# Patient Record
Sex: FEMALE | Race: WHITE | NOT HISPANIC OR LATINO | Employment: FULL TIME | URBAN - NONMETROPOLITAN AREA
[De-identification: names, ages, dates, MRNs, and addresses within clinical notes are randomized per-mention and may not be internally consistent; named-entity substitution may affect disease eponyms.]

---

## 2017-10-13 DIAGNOSIS — Z12.31 ENCOUNTER FOR SCREENING MAMMOGRAM FOR MALIGNANT NEOPLASM OF BREAST: Primary | ICD-10-CM

## 2017-10-19 ENCOUNTER — LAB (OUTPATIENT)
Dept: LAB | Facility: HOSPITAL | Age: 47
End: 2017-10-19

## 2017-10-19 DIAGNOSIS — Z00.00 GENERAL MEDICAL EXAM: Primary | ICD-10-CM

## 2017-10-19 DIAGNOSIS — Z00.00 GENERAL MEDICAL EXAM: ICD-10-CM

## 2017-10-19 DIAGNOSIS — Z00.00 ANNUAL PHYSICAL EXAM: ICD-10-CM

## 2017-10-19 LAB
ARTICHOKE IGE QN: 79 MG/DL (ref 1–129)
CHOLEST SERPL-MCNC: 182 MG/DL (ref 0–199)
HDLC SERPL-MCNC: 74 MG/DL (ref 60–200)
LDLC/HDLC SERPL: 1.31 {RATIO} (ref 0–3.22)
T4 FREE SERPL-MCNC: 1.33 NG/DL (ref 0.78–2.19)
TRIGL SERPL-MCNC: 54 MG/DL (ref 20–199)
TSH SERPL DL<=0.05 MIU/L-ACNC: 0.42 MIU/ML (ref 0.46–4.68)

## 2017-10-19 PROCEDURE — 80053 COMPREHEN METABOLIC PANEL: CPT | Performed by: GENERAL PRACTICE

## 2017-10-19 PROCEDURE — 84439 ASSAY OF FREE THYROXINE: CPT | Performed by: GENERAL PRACTICE

## 2017-10-19 PROCEDURE — 36415 COLL VENOUS BLD VENIPUNCTURE: CPT

## 2017-10-19 PROCEDURE — 84443 ASSAY THYROID STIM HORMONE: CPT | Performed by: GENERAL PRACTICE

## 2017-10-19 PROCEDURE — 80061 LIPID PANEL: CPT | Performed by: GENERAL PRACTICE

## 2017-10-20 ENCOUNTER — OFFICE VISIT (OUTPATIENT)
Dept: FAMILY MEDICINE CLINIC | Facility: CLINIC | Age: 47
End: 2017-10-20

## 2017-10-20 VITALS
OXYGEN SATURATION: 98 % | HEART RATE: 65 BPM | WEIGHT: 137.8 LBS | BODY MASS INDEX: 22.96 KG/M2 | HEIGHT: 65 IN | SYSTOLIC BLOOD PRESSURE: 112 MMHG | DIASTOLIC BLOOD PRESSURE: 70 MMHG

## 2017-10-20 DIAGNOSIS — E03.9 ACQUIRED HYPOTHYROIDISM: Chronic | ICD-10-CM

## 2017-10-20 DIAGNOSIS — Z00.00 ANNUAL PHYSICAL EXAM: Primary | ICD-10-CM

## 2017-10-20 DIAGNOSIS — M72.2 PLANTAR FASCIITIS: ICD-10-CM

## 2017-10-20 LAB
ALBUMIN SERPL-MCNC: 3.9 G/DL (ref 3.4–4.8)
ALBUMIN/GLOB SERPL: 1.3 G/DL (ref 1.1–1.8)
ALP SERPL-CCNC: 42 U/L (ref 38–126)
ALT SERPL W P-5'-P-CCNC: 24 U/L (ref 9–52)
ANION GAP SERPL CALCULATED.3IONS-SCNC: 12 MMOL/L (ref 5–15)
AST SERPL-CCNC: 21 U/L (ref 14–36)
BILIRUB SERPL-MCNC: 0.8 MG/DL (ref 0.2–1.3)
BUN BLD-MCNC: 11 MG/DL (ref 7–21)
BUN/CREAT SERPL: 17.5 (ref 7–25)
CALCIUM SPEC-SCNC: 8.9 MG/DL (ref 8.4–10.2)
CHLORIDE SERPL-SCNC: 104 MMOL/L (ref 95–110)
CO2 SERPL-SCNC: 25 MMOL/L (ref 22–31)
CREAT BLD-MCNC: 0.63 MG/DL (ref 0.5–1)
GFR SERPL CREATININE-BSD FRML MDRD: 101 ML/MIN/1.73 (ref 60–135)
GLOBULIN UR ELPH-MCNC: 2.9 GM/DL (ref 2.3–3.5)
GLUCOSE BLD-MCNC: 84 MG/DL (ref 60–100)
POTASSIUM BLD-SCNC: 4.6 MMOL/L (ref 3.5–5.1)
PROT SERPL-MCNC: 6.8 G/DL (ref 6.3–8.6)
SODIUM BLD-SCNC: 141 MMOL/L (ref 137–145)

## 2017-10-20 PROCEDURE — 99396 PREV VISIT EST AGE 40-64: CPT | Performed by: GENERAL PRACTICE

## 2017-10-20 RX ORDER — MELOXICAM 15 MG/1
15 TABLET ORAL DAILY PRN
Qty: 30 TABLET | Refills: 2 | Status: SHIPPED | OUTPATIENT
Start: 2017-10-20 | End: 2018-05-04

## 2017-10-20 RX ORDER — METHYLPREDNISOLONE 4 MG/1
TABLET ORAL
Qty: 21 TABLET | Refills: 0 | Status: SHIPPED | OUTPATIENT
Start: 2017-10-20 | End: 2018-05-02

## 2017-10-20 NOTE — PROGRESS NOTES
Subjective   Vanessa Beach is a 47 y.o. female.     Chief Complaint   Patient presents with   • Annual Exam     labs smamo   • Thyroid Problem   • Hyperlipidemia       History of Present Illness     For annual wellness exam.  Due for mammogram.  Labs reviewed. Training for a marathon and having heel pain.     The following portions of the patient's history were reviewed and updated as appropriate: allergies, current medications, past family and social history and problem list.    Outpatient Medications Prior to Visit   Medication Sig Dispense Refill   • ACETAMINOPHEN PO Take  by mouth.     • Cholecalciferol 1000 UNITS tablet Take 1,000 Units by mouth daily.     • levothyroxine (SYNTHROID, LEVOTHROID) 88 MCG tablet Take 1 tablet by mouth Daily. 90 tablet 4     No facility-administered medications prior to visit.      Review of Systems   Constitutional: Negative.  Negative for chills, fatigue, fever and unexpected weight change.   HENT: Negative.  Negative for congestion, ear pain, hearing loss, nosebleeds, rhinorrhea, sneezing, sore throat and tinnitus.    Eyes: Negative.  Negative for discharge.   Respiratory: Negative.  Negative for cough, shortness of breath and wheezing.    Cardiovascular: Negative.  Negative for chest pain and palpitations.   Gastrointestinal: Negative.  Negative for abdominal pain, constipation, diarrhea, nausea and vomiting.   Endocrine: Negative.    Genitourinary: Negative.  Negative for dysuria, frequency and urgency.   Musculoskeletal: Negative.  Negative for arthralgias, back pain, joint swelling, myalgias and neck pain.   Skin: Negative.  Negative for rash.   Allergic/Immunologic: Negative.    Neurological: Negative.  Negative for dizziness, weakness, numbness and headaches.   Hematological: Negative.  Negative for adenopathy.   Psychiatric/Behavioral: Negative.  Negative for dysphoric mood and sleep disturbance. The patient is not nervous/anxious.      Objective     Visit Vitals   •  "/70   • Pulse 65   • Ht 65\" (165.1 cm)   • Wt 137 lb 12.8 oz (62.5 kg)   • SpO2 98%   • BMI 22.93 kg/m2     Physical Exam   Constitutional: She is oriented to person, place, and time. She appears well-developed and well-nourished. No distress.   HENT:   Head: Normocephalic and atraumatic.   Nose: Nose normal.   Mouth/Throat: Oropharynx is clear and moist.   Eyes: Conjunctivae and EOM are normal. Pupils are equal, round, and reactive to light. Right eye exhibits no discharge. Left eye exhibits no discharge.   Neck: No tracheal deviation present. No thyromegaly present.   Cardiovascular: Normal rate, regular rhythm, normal heart sounds and intact distal pulses.    No murmur heard.  Pulmonary/Chest: Effort normal and breath sounds normal. No respiratory distress. She has no wheezes. She has no rales. She exhibits no tenderness. Right breast exhibits no inverted nipple, no mass, no nipple discharge, no skin change and no tenderness. Left breast exhibits no inverted nipple, no mass, no nipple discharge, no skin change and no tenderness.   Abdominal: Soft. Bowel sounds are normal. She exhibits no distension and no mass. There is no tenderness. No hernia.   Musculoskeletal: Normal range of motion. She exhibits no deformity.    Diabetic foot exam performed: tender over calcaneus.  Lymphadenopathy:     She has no cervical adenopathy.   Neurological: She is alert and oriented to person, place, and time. She has normal reflexes.   Skin: Skin is warm and dry.   Psychiatric: She has a normal mood and affect. Her behavior is normal. Judgment and thought content normal.   Nursing note and vitals reviewed.    Results for orders placed or performed in visit on 10/19/17   Lipid panel   Result Value Ref Range    Total Cholesterol 182 0 - 199 mg/dL    Triglycerides 54 20 - 199 mg/dL    HDL Cholesterol 74 60 - 200 mg/dL    LDL Cholesterol  79 1 - 129 mg/dL    LDL/HDL Ratio 1.31 0.00 - 3.22   TSH   Result Value Ref Range    TSH " 0.420 (L) 0.460 - 4.680 mIU/mL   T4, free   Result Value Ref Range    Free T4 1.33 0.78 - 2.19 ng/dL   Comprehensive Metabolic Panel   Result Value Ref Range    Glucose 84 60 - 100 mg/dL    BUN 11 7 - 21 mg/dL    Creatinine 0.63 0.50 - 1.00 mg/dL    Sodium 141 137 - 145 mmol/L    Potassium 4.6 3.5 - 5.1 mmol/L    Chloride 104 95 - 110 mmol/L    CO2 25.0 22.0 - 31.0 mmol/L    Calcium 8.9 8.4 - 10.2 mg/dL    Total Protein 6.8 6.3 - 8.6 g/dL    Albumin 3.90 3.40 - 4.80 g/dL    ALT (SGPT) 24 9 - 52 U/L    AST (SGOT) 21 14 - 36 U/L    Alkaline Phosphatase 42 38 - 126 U/L    Total Bilirubin 0.8 0.2 - 1.3 mg/dL    eGFR Non African Amer 101 >60 mL/min/1.73    Globulin 2.9 2.3 - 3.5 gm/dL    A/G Ratio 1.3 1.1 - 1.8 g/dL    BUN/Creatinine Ratio 17.5 7.0 - 25.0    Anion Gap 12.0 5.0 - 15.0 mmol/L      Assessment/Plan   Problem List Items Addressed This Visit        Endocrine    Acquired hypothyroidism (Chronic)    Relevant Medications    MethylPREDNISolone (MEDROL, LEON,) 4 MG tablet    Other Relevant Orders    TSH    T4, Free       Musculoskeletal and Integument    Plantar fasciitis      Other Visit Diagnoses     Annual physical exam    -  Primary    Relevant Orders    Comprehensive Metabolic Panel (Completed)    Comprehensive Metabolic Panel    Lipid Panel    TSH    T4, Free          Will notify regarding results.     New Medications Ordered This Visit   Medications   • MethylPREDNISolone (MEDROL, LEON,) 4 MG tablet     Sig: Take as directed on package instructions.     Dispense:  21 tablet     Refill:  0   • meloxicam (MOBIC) 15 MG tablet     Sig: Take 1 tablet by mouth Daily As Needed for Moderate Pain .     Dispense:  30 tablet     Refill:  2     Return in about 1 year (around 10/20/2018) for Annual physical.

## 2017-11-02 ENCOUNTER — CLINICAL SUPPORT (OUTPATIENT)
Dept: FAMILY MEDICINE CLINIC | Facility: CLINIC | Age: 47
End: 2017-11-02

## 2017-11-02 DIAGNOSIS — J06.9 VIRAL UPPER RESPIRATORY TRACT INFECTION: Primary | ICD-10-CM

## 2017-11-02 PROCEDURE — 96372 THER/PROPH/DIAG INJ SC/IM: CPT | Performed by: NURSE PRACTITIONER

## 2017-11-02 RX ORDER — TRIAMCINOLONE ACETONIDE 40 MG/ML
60 INJECTION, SUSPENSION INTRA-ARTICULAR; INTRAMUSCULAR ONCE
Status: COMPLETED | OUTPATIENT
Start: 2017-11-02 | End: 2017-11-02

## 2017-11-02 RX ADMIN — TRIAMCINOLONE ACETONIDE 60 MG: 40 INJECTION, SUSPENSION INTRA-ARTICULAR; INTRAMUSCULAR at 11:59

## 2018-01-08 RX ORDER — LEVOTHYROXINE SODIUM 88 UG/1
TABLET ORAL
Qty: 90 TABLET | Refills: 4 | Status: SHIPPED | OUTPATIENT
Start: 2018-01-08 | End: 2018-05-04

## 2018-02-07 ENCOUNTER — OFFICE VISIT (OUTPATIENT)
Dept: OTOLARYNGOLOGY | Facility: CLINIC | Age: 48
End: 2018-02-07

## 2018-02-07 VITALS — HEIGHT: 65 IN | WEIGHT: 135.4 LBS | OXYGEN SATURATION: 98 % | BODY MASS INDEX: 22.56 KG/M2

## 2018-02-07 DIAGNOSIS — R09.89 GLOBUS SENSATION: Primary | ICD-10-CM

## 2018-02-07 DIAGNOSIS — K21.9 GASTROESOPHAGEAL REFLUX DISEASE WITHOUT ESOPHAGITIS: ICD-10-CM

## 2018-02-07 DIAGNOSIS — J37.0 CHRONIC LARYNGITIS: ICD-10-CM

## 2018-02-07 PROCEDURE — 31575 DIAGNOSTIC LARYNGOSCOPY: CPT | Performed by: OTOLARYNGOLOGY

## 2018-02-07 PROCEDURE — 99203 OFFICE O/P NEW LOW 30 MIN: CPT | Performed by: OTOLARYNGOLOGY

## 2018-02-11 NOTE — PROGRESS NOTES
Subjective   Vanessa Beach is a 47 y.o. female.     History of Present Illness   Patient reports that back in November when she was training for a marathon she experiences sensation of something hanging in her throat.  She does have symptoms of acid reflux.  She initially would take some Zantac over-the-counter.  Was given steroids before her Marathon due to plantar fasciitis and experienced an episode of what sounds like nocturnal laryngospasm being awakened from sleep with a sensation that she was choking.  Since that time she has had intermittent symptoms of a sensation of a foreign body in her throat.  She says at times it will even feel like she has food caught in her throat.  No hemoptysis.  She has never been a tobacco user.      The following portions of the patient's history were reviewed and updated as appropriate: allergies, current medications, past family history, past medical history, past social history, past surgical history and problem list.      Vanessa Beach reports that she has never smoked. She has never used smokeless tobacco. She reports that she drinks alcohol.  Patient is not a tobacco user and has not been counseled for use of tobacco products    Family History   Problem Relation Age of Onset   • Uterine cancer Mother    • Coronary artery disease Mother    • Hypertension Mother    • Cancer Mother    • Diabetes Mother    • Bleeding Disorder Mother    • Depression Father        Allergies   Allergen Reactions   • Minocin [Minocycline Hcl] Other (See Comments)     Skin coloration changes         Current Outpatient Prescriptions:   •  ACETAMINOPHEN PO, Take  by mouth., Disp: , Rfl:   •  Cholecalciferol 1000 UNITS tablet, Take 1,000 Units by mouth daily., Disp: , Rfl:   •  levothyroxine (SYNTHROID, LEVOTHROID) 88 MCG tablet, TAKE ONE TABLET BY MOUTH DAILY, Disp: 90 tablet, Rfl: 4  •  meloxicam (MOBIC) 15 MG tablet, Take 1 tablet by mouth Daily As Needed for Moderate Pain ., Disp: 30  tablet, Rfl: 2  •  MethylPREDNISolone (MEDROL, LEON,) 4 MG tablet, Take as directed on package instructions., Disp: 21 tablet, Rfl: 0    Past Medical History:   Diagnosis Date   • Abnormal mammography    • Acquired hypothyroidism    • Acute bronchitis    • Acute otitis externa    • Allergic reaction     Allergic reaction to substance - Poison IVY      • Alopecia    • Aphthous ulcer of mouth    • Contact dermatitis due to plants, except food    • Cough    • Fever    • Herpes zoster    • Presbyopia    • Upper respiratory infection    • Vitamin D deficiency    • Wheezing          Review of Systems   Constitutional: Negative for fever.   Respiratory: Negative for cough.    All other systems reviewed and are negative.          Objective   Physical Exam  General: Well-developed well-nourished female in no acute distress.  Alert and oriented ×3. Head: Normocephalic. Face: Symmetrical strength and appearance. PERRL. EOMI. Voice:Strong. Speech:Fluent  Ears: External ears no deformity, canals no discharge, tympanic membranes intact clear and mobile bilaterally.  Nose: Nares show no discharge mass polyp or purulence.  Boggy mucosa is present.  No gross external deformity.  Septum: Midline  Oral cavity: Lips and gums without lesions.  Tongue and floor of mouth without lesions.  Parotid and submandibular ducts unobstructed.  No mucosal lesions on the buccal mucosa or vestibule of the mouth.  Pharynx: No erythema exudate mass or ulcer  Neck: No lymphadenopathy.  No thyromegaly.  Trachea and larynx midline.  No masses in the parotid or submandibular glands.    Procedure Note    Pre-operative Diagnosis:   Chief Complaint   Patient presents with   • Sore Throat       Post-operative Diagnosis:Chronic laryngitis    Anesthesia: topical with xylocaine and neosynephrine    Endoscopy Type:  Flexible Laryngoscopy    Procedure Details:    The patient was placed in the sitting position.  After topical anesthesia and decongestion, the 4 mm  laryngoscope was passed.  The nasal cavities, nasopharynx, oropharynx, hypopharynx, and larynx were all examined.  Vocal cords were examined during respiration and phonation.  The following findings were noted:    Findings: No masses in the nose, nasopharynx, hypopharynx.  Endolarynx shows mild chronic appearing edema and erythema particularly posteriorly consistent with chronic laryngitis.  Palpation of the interarytenoid fold with the tip of the scope reproduces the symptoms for the patient.  Vocal cord mobility is intact.  No evidence of endolaryngeal neoplasm.    Condition:  Stable.  Patient tolerated procedure well.    Complications:  None        Assessment/Plan   Vanessa was seen today for sore throat.    Diagnoses and all orders for this visit:    Globus sensation    Chronic laryngitis    Gastroesophageal reflux disease without esophagitis        Plan: Prescribed omeprazole 20 mg a day.  Advised general reflux precautions including avoidance of caffeine and carbonation as well as not eating within 2 hours of when she is going to lie down.  Explained that it may take several weeks to experience improvement in symptoms.  Return in 6 weeks call sooner for problems.

## 2018-03-08 ENCOUNTER — LAB (OUTPATIENT)
Dept: LAB | Facility: HOSPITAL | Age: 48
End: 2018-03-08

## 2018-03-08 DIAGNOSIS — E03.9 ACQUIRED HYPOTHYROIDISM: Primary | Chronic | ICD-10-CM

## 2018-03-08 DIAGNOSIS — E03.9 ACQUIRED HYPOTHYROIDISM: Chronic | ICD-10-CM

## 2018-03-08 LAB
T4 FREE SERPL-MCNC: 1.31 NG/DL (ref 0.78–2.19)
TSH SERPL DL<=0.05 MIU/L-ACNC: 0.36 MIU/ML (ref 0.46–4.68)

## 2018-03-08 PROCEDURE — 84443 ASSAY THYROID STIM HORMONE: CPT

## 2018-03-08 PROCEDURE — 36415 COLL VENOUS BLD VENIPUNCTURE: CPT

## 2018-03-08 PROCEDURE — 84439 ASSAY OF FREE THYROXINE: CPT

## 2018-05-02 ENCOUNTER — OFFICE VISIT (OUTPATIENT)
Dept: SURGERY | Facility: CLINIC | Age: 48
End: 2018-05-02

## 2018-05-02 VITALS
BODY MASS INDEX: 22.99 KG/M2 | HEIGHT: 65 IN | WEIGHT: 138 LBS | SYSTOLIC BLOOD PRESSURE: 116 MMHG | DIASTOLIC BLOOD PRESSURE: 74 MMHG

## 2018-05-02 DIAGNOSIS — M79.89 SOFT TISSUE MASS: Primary | ICD-10-CM

## 2018-05-02 PROCEDURE — 99203 OFFICE O/P NEW LOW 30 MIN: CPT | Performed by: SURGERY

## 2018-05-02 NOTE — PROGRESS NOTES
Subjective   Vanessa Beach is a 48 y.o. female     History of Present Illness symptomatic soft tissue mass right buttock.  Patient states been there for years but recently has enlarged in size and becomes more bothersome.  When she sits down through the bone her right buttock.  History of drainage in the very distant past but recently she thought this was just scar.  No history of any similar lesions.  No history of shot injection at the site.        Review of Systems   Constitutional: Negative.    HENT: Negative.    Eyes: Negative.    Respiratory: Negative.    Cardiovascular: Negative.    Gastrointestinal: Negative.    Endocrine: Negative.    Genitourinary: Negative.    Musculoskeletal: Negative.    Skin:        Skin Lesion Rt. Buttock   Allergic/Immunologic: Negative.    Neurological: Negative.    Hematological: Negative.    Psychiatric/Behavioral: Negative.      Past Medical History:   Diagnosis Date   • Abnormal mammography    • Acquired hypothyroidism    • Acute bronchitis    • Acute otitis externa    • Allergic reaction     Allergic reaction to substance - Poison IVY      • Alopecia    • Aphthous ulcer of mouth    • Contact dermatitis due to plants, except food    • Cough    • Fever    • Herpes zoster    • Presbyopia    • Upper respiratory infection    • Vitamin D deficiency    • Wheezing      Past Surgical History:   Procedure Laterality Date   • HYSTERECTOMY     • INJECTION OF MEDICATION  12/08/2014    KENALOG(2)   • LAPAROSCOPIC TUBAL LIGATION     • THYROIDECTOMY, PARTIAL  02/07/2000     Family History   Problem Relation Age of Onset   • Uterine cancer Mother    • Coronary artery disease Mother    • Hypertension Mother    • Cancer Mother    • Diabetes Mother    • Bleeding Disorder Mother    • Depression Father      Social History     Social History   • Marital status:      Spouse name: N/A   • Number of children: N/A   • Years of education: N/A     Occupational History   • Not on file.     Social  History Main Topics   • Smoking status: Never Smoker   • Smokeless tobacco: Never Used   • Alcohol use Yes   • Drug use: Unknown   • Sexual activity: Not on file     Other Topics Concern   • Not on file     Social History Narrative   • No narrative on file     Allergies   Allergen Reactions   • Minocin [Minocycline Hcl] Other (See Comments)     Skin coloration changes       Home Medications:  Prior to Admission medications    Medication Sig Start Date End Date Taking? Authorizing Provider   ACETAMINOPHEN PO Take  by mouth.   Yes Historical Provider, MD   Cholecalciferol 1000 UNITS tablet Take 1,000 Units by mouth daily.   Yes Historical Provider, MD   levothyroxine (SYNTHROID, LEVOTHROID) 88 MCG tablet TAKE ONE TABLET BY MOUTH DAILY 1/8/18  Yes Meghana Connolly MD   meloxicam (MOBIC) 15 MG tablet Take 1 tablet by mouth Daily As Needed for Moderate Pain . 10/20/17   Meghana Connolly MD   MethylPREDNISolone (MEDROL, LEON,) 4 MG tablet Take as directed on package instructions. 10/20/17 5/2/18  Meghana Connolly MD       Objective   Physical Exam   Constitutional: She is oriented to person, place, and time. She appears well-developed and well-nourished. No distress.   HENT:   Head: Normocephalic and atraumatic.   Nose: Nose normal.   Eyes: Conjunctivae are normal.   Neck: Normal range of motion. No tracheal deviation present. No thyromegaly present.   Cardiovascular: Normal rate.    No murmur heard.  Pulmonary/Chest: Effort normal and breath sounds normal.   Abdominal: Soft. She exhibits no distension. There is no tenderness. There is no rebound and no guarding. No hernia.   Musculoskeletal: She exhibits no tenderness or deformity.   Neurological: She is alert and oriented to person, place, and time.   Skin: Skin is warm and dry. No rash noted.   Psychiatric: She has a normal mood and affect. Her behavior is normal. Judgment and thought content normal.   Vitals reviewed.   right buttock over ishial  Tuberosity as A1.5  to 1.5 cm soft tissue mass superficial and not fixed without any redness drainage or fluctuance mildly tender to palpation    Assessment/Plan  scar prominent soft tissue mass right buttock.  Recommend excision this lesion has gotten larger and is symptomatic.  Fully discussed excising this and patient clearly understands alternatives risk and benefits and wishes to proceed      The encounter diagnosis was Soft tissue mass.                     This document has been electronically signed by Miguelito Galvan MD on May 2, 2018 9:07 AM

## 2018-05-02 NOTE — PATIENT INSTRUCTIONS
MyPlate from [employer]  The general, healthful diet is based on the 2010 Dietary Guidelines for Americans. The amount of food you need to eat from each food group depends on your age, sex, and level of physical activity and can be individualized by a dietitian. Go to ChooseMyPlate.gov for more information.  What do I need to know about the MyPlate plan?  · Enjoy your food, but eat less.  · Avoid oversized portions.  ¨ ½ of your plate should include fruits and vegetables.  ¨ ¼ of your plate should be grains.  ¨ ¼ of your plate should be protein.  Grains   · Make at least half of your grains whole grains.  · For a 2,000 calorie daily food plan, eat 6 oz every day.  · 1 oz is about 1 slice bread, 1 cup cereal, or ½ cup cooked rice, cereal, or pasta.  Vegetables   · Make half your plate fruits and vegetables.  · For a 2,000 calorie daily food plan, eat 2½ cups every day.  · 1 cup is about 1 cup raw or cooked vegetables or vegetable juice or 2 cups raw leafy greens.  Fruits   · Make half your plate fruits and vegetables.  · For a 2,000 calorie daily food plan, eat 2 cups every day.  · 1 cup is about 1 cup fruit or 100% fruit juice or ½ cup dried fruit.  Protein   · For a 2,000 calorie daily food plan, eat 5½ oz every day.  · 1 oz is about 1 oz meat, poultry, or fish, ¼ cup cooked beans, 1 egg, 1 Tbsp peanut butter, or ½ oz nuts or seeds.  Dairy   · Switch to fat-free or low-fat (1%) milk.  · For a 2,000 calorie daily food plan, eat 3 cups every day.  · 1 cup is about 1 cup milk or yogurt or soy milk (soy beverage), 1½ oz natural cheese, or 2 oz processed cheese.  Fats, Oils, and Empty Calories   · Only small amounts of oils are recommended.  · Empty calories are calories from solid fats or added sugars.  · Compare sodium in foods like soup, bread, and frozen meals. Choose the foods with lower numbers.  · Drink water instead of sugary drinks.  What foods can I eat?  Grains   Whole grains such as whole wheat, quinoa, millet,  and bulgur. Bread, rolls, and pasta made from whole grains. Brown or wild rice. Hot or cold cereals made from whole grains and without added sugar.  Vegetables   All fresh vegetables, especially fresh red, dark green, or orange vegetables. Peas and beans. Low-sodium frozen or canned vegetables prepared without added salt. Low-sodium vegetable juices.  Fruits   All fresh, frozen, and dried fruits. Canned fruit packed in water or fruit juice without added sugar. Fruit juices without added sugar.  Meats and Other Protein Sources   Boiled, baked, or grilled lean meat trimmed of fat. Skinless poultry. Fresh seafood and shellfish. Canned seafood packed in water. Unsalted nuts and unsalted nut butters. Tofu. Dried beans and pea. Eggs.  Dairy   Low-fat or fat-free milk, yogurt, and cheeses.  Sweets and Desserts   Frozen desserts made from low-fat milk.  Fats and Oils   Olive, peanut, and canola oils and margarine. Salad dressing and mayonnaise made from these oils.  Other   Soups and casseroles made from allowed ingredients and without added fat or salt.  The items listed above may not be a complete list of recommended foods or beverages. Contact your dietitian for more options.   What foods are not recommended?  Grains   Sweetened, low-fiber cereals. Packaged baked goods. Snack crackers and chips. Cheese crackers, butter crackers, and biscuits. Frozen waffles, sweet breads, doughnuts, pastries, packaged baking mixes, pancakes, cakes, and cookies.  Vegetables   Regular canned or frozen vegetables or vegetables prepared with salt. Canned tomatoes. Canned tomato sauce. Fried vegetables. Vegetables in cream sauce or cheese sauce.  Fruits   Fruits packed in syrup or made with added sugar.  Meats and Other Protein Sources   Marbled or fatty meats such as ribs. Poultry with skin. Fried meats, poultry, eggs, or fish. Sausages, hot dogs, and deli meats such as pastrami, bologna, or salami.  Dairy   Whole milk, cream, cheeses made  from whole milk, sour cream. Ice cream or yogurt made from whole milk or with added sugar.  Beverages   For adults, no more than one alcoholic drink per day. Regular soft drinks or other sugary beverages. Juice drinks.  Sweets and Desserts   Sugary or fatty desserts, candy, and other sweets.  Fats and Oils   Solid shortening or partially hydrogenated oils. Solid margarine. Margarine that contains trans fats. Butter.  The items listed above may not be a complete list of foods and beverages to avoid. Contact your dietitian for more information.   This information is not intended to replace advice given to you by your health care provider. Make sure you discuss any questions you have with your health care provider.  Document Released: 01/06/2009 Document Revised: 05/25/2017 Document Reviewed: 11/26/2014  [employer] Interactive Patient Education © 2017 [employer]

## 2018-05-04 ENCOUNTER — APPOINTMENT (OUTPATIENT)
Dept: PREADMISSION TESTING | Facility: HOSPITAL | Age: 48
End: 2018-05-04

## 2018-05-04 VITALS
RESPIRATION RATE: 14 BRPM | HEART RATE: 68 BPM | WEIGHT: 136 LBS | OXYGEN SATURATION: 98 % | HEIGHT: 65 IN | DIASTOLIC BLOOD PRESSURE: 70 MMHG | SYSTOLIC BLOOD PRESSURE: 100 MMHG | BODY MASS INDEX: 22.66 KG/M2

## 2018-05-04 RX ORDER — LEVOTHYROXINE SODIUM 88 UG/1
88 TABLET ORAL DAILY
COMMUNITY
End: 2018-10-23 | Stop reason: SDUPTHER

## 2018-05-04 RX ORDER — OMEPRAZOLE 20 MG/1
20 CAPSULE, DELAYED RELEASE ORAL DAILY
COMMUNITY

## 2018-05-04 RX ORDER — SODIUM CHLORIDE, SODIUM GLUCONATE, SODIUM ACETATE, POTASSIUM CHLORIDE, AND MAGNESIUM CHLORIDE 526; 502; 368; 37; 30 MG/100ML; MG/100ML; MG/100ML; MG/100ML; MG/100ML
1000 INJECTION, SOLUTION INTRAVENOUS CONTINUOUS
Status: CANCELLED | OUTPATIENT
Start: 2018-05-08

## 2018-05-04 NOTE — DISCHARGE INSTRUCTIONS
[facility]  Pre-op Information and Guidelines    You will be called after 2 p.m. the day before your surgery (Friday for Monday surgery) and notified of your time for arrival and approximate surgery time.  If you have not received a call by 4P.M., please contact Same Day Surgery at [phone] of if outside [facility] call [phone].    Please Follow these Important Safety Guidelines:    • The morning of your procedure, take only the medications listed below with   A sip of water:_____________________________________________       __LEVOTHYROXINE, PRILOSEC_________________    • DO NOT eat or drink anything after 12:00 midnight the night before surgery  Specific instructions concerning drinking clear liquids will be discussed during  the pre-surgery instruction call the day before your surgery.    • If you take a blood thinner (ex. Plavix, Coumadin, aspirin), ask your doctor when to stop it before surgery  STOP DATE: _________________    • Only 2 visitors are allowed in patient rooms at a time  Your visitors will be asked to wait in the lobby until the admission process is complete with the exception of a parent with a child and patients in need of special assistance.    • YOU CANNOT DRIVE YOURSELF HOME  You must be accompanied by someone who will be responsible for driving you home after surgery and for your care at home.    • DO NOT chew gum, use breath mints, hard candy, or smoke the day of surgery  • DO NOT drink alcohol for at least 24 hours before your surgery  • DO NOT wear any jewelry and remove all body piercing before coming to the hospital  • DO NOT wear make-up to the hospital  • If you are having surgery on an extremity (arm/leg/foot) remove nail polish/artificial nails on the surgical side  • Clothing, glasses, contacts, dentures, and hairpieces must be removed before surgery  • Bathe the night before or the morning of your surgery and do not use powders/lotions on  skin.

## 2018-05-07 ENCOUNTER — ANESTHESIA EVENT (OUTPATIENT)
Dept: PERIOP | Facility: HOSPITAL | Age: 48
End: 2018-05-07

## 2018-05-08 ENCOUNTER — HOSPITAL ENCOUNTER (OUTPATIENT)
Facility: HOSPITAL | Age: 48
Setting detail: HOSPITAL OUTPATIENT SURGERY
Discharge: HOME | End: 2018-05-08
Attending: SURGERY | Admitting: SURGERY

## 2018-05-08 ENCOUNTER — ANESTHESIA (OUTPATIENT)
Dept: PERIOP | Facility: HOSPITAL | Age: 48
End: 2018-05-08

## 2018-05-08 VITALS
HEART RATE: 76 BPM | WEIGHT: 137.13 LBS | OXYGEN SATURATION: 96 % | TEMPERATURE: 97.7 F | SYSTOLIC BLOOD PRESSURE: 102 MMHG | DIASTOLIC BLOOD PRESSURE: 63 MMHG | BODY MASS INDEX: 22.85 KG/M2 | HEIGHT: 65 IN | RESPIRATION RATE: 20 BRPM

## 2018-05-08 DIAGNOSIS — M79.89 SOFT TISSUE MASS: ICD-10-CM

## 2018-05-08 PROCEDURE — 88331 PATH CONSLTJ SURG 1 BLK 1SPC: CPT | Performed by: PATHOLOGY

## 2018-05-08 PROCEDURE — 88305 TISSUE EXAM BY PATHOLOGIST: CPT | Performed by: PATHOLOGY

## 2018-05-08 PROCEDURE — 25010000002 DEXAMETHASONE PER 1 MG

## 2018-05-08 PROCEDURE — 11404 EXC TR-EXT B9+MARG 3.1-4 CM: CPT | Performed by: SURGERY

## 2018-05-08 PROCEDURE — 88331 PATH CONSLTJ SURG 1 BLK 1SPC: CPT | Performed by: SURGERY

## 2018-05-08 PROCEDURE — 88332 PATH CONSLTJ SURG EA ADD BLK: CPT | Performed by: PATHOLOGY

## 2018-05-08 PROCEDURE — 88305 TISSUE EXAM BY PATHOLOGIST: CPT | Performed by: SURGERY

## 2018-05-08 PROCEDURE — 25010000002 PROPOFOL 10 MG/ML EMULSION

## 2018-05-08 PROCEDURE — 25010000002 FENTANYL CITRATE (PF) 100 MCG/2ML SOLUTION

## 2018-05-08 PROCEDURE — 88332 PATH CONSLTJ SURG EA ADD BLK: CPT | Performed by: SURGERY

## 2018-05-08 RX ORDER — LIDOCAINE HYDROCHLORIDE 10 MG/ML
INJECTION, SOLUTION INFILTRATION; PERINEURAL AS NEEDED
Status: DISCONTINUED | OUTPATIENT
Start: 2018-05-08 | End: 2018-05-08 | Stop reason: SURG

## 2018-05-08 RX ORDER — OXYCODONE HYDROCHLORIDE AND ACETAMINOPHEN 5; 325 MG/1; MG/1
1 TABLET ORAL EVERY 6 HOURS PRN
Qty: 12 TABLET | Refills: 0 | Status: SHIPPED | OUTPATIENT
Start: 2018-05-08 | End: 2018-05-16

## 2018-05-08 RX ORDER — FENTANYL CITRATE 50 UG/ML
INJECTION, SOLUTION INTRAMUSCULAR; INTRAVENOUS AS NEEDED
Status: DISCONTINUED | OUTPATIENT
Start: 2018-05-08 | End: 2018-05-08 | Stop reason: SURG

## 2018-05-08 RX ORDER — SCOLOPAMINE TRANSDERMAL SYSTEM 1 MG/1
1 PATCH, EXTENDED RELEASE TRANSDERMAL ONCE
Status: DISCONTINUED | OUTPATIENT
Start: 2018-05-08 | End: 2018-05-08 | Stop reason: HOSPADM

## 2018-05-08 RX ORDER — DEXAMETHASONE SODIUM PHOSPHATE 4 MG/ML
INJECTION, SOLUTION INTRA-ARTICULAR; INTRALESIONAL; INTRAMUSCULAR; INTRAVENOUS; SOFT TISSUE AS NEEDED
Status: DISCONTINUED | OUTPATIENT
Start: 2018-05-08 | End: 2018-05-08 | Stop reason: SURG

## 2018-05-08 RX ORDER — PROPOFOL 10 MG/ML
VIAL (ML) INTRAVENOUS AS NEEDED
Status: DISCONTINUED | OUTPATIENT
Start: 2018-05-08 | End: 2018-05-08 | Stop reason: SURG

## 2018-05-08 RX ORDER — SODIUM CHLORIDE, SODIUM GLUCONATE, SODIUM ACETATE, POTASSIUM CHLORIDE, AND MAGNESIUM CHLORIDE 526; 502; 368; 37; 30 MG/100ML; MG/100ML; MG/100ML; MG/100ML; MG/100ML
1000 INJECTION, SOLUTION INTRAVENOUS CONTINUOUS
Status: DISCONTINUED | OUTPATIENT
Start: 2018-05-08 | End: 2018-05-08 | Stop reason: HOSPADM

## 2018-05-08 RX ADMIN — SCOPALAMINE 1 PATCH: 1 PATCH, EXTENDED RELEASE TRANSDERMAL at 11:57

## 2018-05-08 RX ADMIN — LIDOCAINE HYDROCHLORIDE 20 MG: 10 INJECTION, SOLUTION INFILTRATION; PERINEURAL at 12:49

## 2018-05-08 RX ADMIN — DEXAMETHASONE SODIUM PHOSPHATE 4 MG: 4 INJECTION, SOLUTION INTRAMUSCULAR; INTRAVENOUS at 12:53

## 2018-05-08 RX ADMIN — PROPOFOL 20 MG: 10 INJECTION, EMULSION INTRAVENOUS at 13:03

## 2018-05-08 RX ADMIN — PROPOFOL 20 MG: 10 INJECTION, EMULSION INTRAVENOUS at 13:05

## 2018-05-08 RX ADMIN — FENTANYL CITRATE 50 MCG: 50 INJECTION, SOLUTION INTRAMUSCULAR; INTRAVENOUS at 12:48

## 2018-05-08 RX ADMIN — PROPOFOL 10 MG: 10 INJECTION, EMULSION INTRAVENOUS at 13:00

## 2018-05-08 RX ADMIN — PROPOFOL 20 MG: 10 INJECTION, EMULSION INTRAVENOUS at 12:59

## 2018-05-08 RX ADMIN — PROPOFOL 20 MG: 10 INJECTION, EMULSION INTRAVENOUS at 12:56

## 2018-05-08 RX ADMIN — PROPOFOL 50 MG: 10 INJECTION, EMULSION INTRAVENOUS at 12:49

## 2018-05-08 RX ADMIN — SODIUM CHLORIDE, SODIUM GLUCONATE, SODIUM ACETATE, POTASSIUM CHLORIDE, AND MAGNESIUM CHLORIDE 1000 ML: 526; 502; 368; 37; 30 INJECTION, SOLUTION INTRAVENOUS at 11:39

## 2018-05-08 RX ADMIN — PROPOFOL 10 MG: 10 INJECTION, EMULSION INTRAVENOUS at 13:06

## 2018-05-08 NOTE — ANESTHESIA POSTPROCEDURE EVALUATION
Patient: Vanessa Beach    Procedure Summary     Date:  05/08/18 Room / Location:  [facility] OR [address] [place] [address] OR    Anesthesia Start:  1243 Anesthesia Stop:  1324    Procedure:  EXCISE SOFT TISSUE MASS RIGHT BUTTOCK       (ANESTHESIA REQUEST YUEN) (Right ) Diagnosis:       Soft tissue mass      (Soft tissue mass [M79.9])    Surgeon:  Miguelito Galvan MD Provider:  Leighton Holguin MD    Anesthesia Type:  MAC ASA Status:  2          Anesthesia Type: MAC  Last vitals  BP   102/63 (05/08/18 1342)   Temp   97.7 °F (36.5 °C) (05/08/18 1342)   Pulse   76 (05/08/18 1342)   Resp   20 (05/08/18 1342)     SpO2   96 % (05/08/18 1342)     Post Anesthesia Care and Evaluation    Patient location during evaluation: bedside  Patient participation: complete - patient participated  Level of consciousness: awake and alert  Pain score: 0  Pain management: adequate  Airway patency: patent  Anesthetic complications: No anesthetic complications    Cardiovascular status: acceptable  Respiratory status: acceptable  Hydration status: acceptable

## 2018-05-08 NOTE — OP NOTE
MASS SOFT TISSUE EXCISION  Procedure Note    Vanessa Beach  5/8/2018    Pre-op Diagnosis:   Soft tissue mass [M79.9]    Post-op Diagnosis:     Post-Op Diagnosis Codes:     * Soft tissue mass [M79.9]    Procedure/CPT® Codes:      Procedure(s):  EXCISE SOFT TISSUE MASS RIGHT BUTTOCK       (ANESTHESIA REQUEST YUEN)    Surgeon(s):  Miguelito Galvan MD    Anesthesia: Choice    Staff:   Circulator: Amita Poe RN  Scrub Person: Anali Palma; Chata Corrigan V  Assistant: Marizol Aguila CSA    Estimated Blood Loss: minimal    Specimens:                ID Type Source Tests Collected by Time   A : 1303, f.s. mass right buttock Tissue Buttock, Right TISSUE PATHOLOGY EXAM Miguelito Galvan MD 5/8/2018 1303         Drains:      Indications:  48 yof with slowly enlarging mass over rt ischial tuberostomy presents for excisional bx    Findings:  Cystic Soft tissue mass completely excised.  Incision was 4 cm in length at completion.    Complications:  None    Procedure: The patient was brought to the operating room. She was placed under MAC anesthesia without any difficulty. Placed left side down, right side up. The right buttock was then prepped and draped in normal sterile fashion. She had SCDs in place. Infiltrated local and good block was obtained. Appropriate timeout was taken. Ellipse of skin outlying the mass was then excised sharply and then using the scalpel electrocautery the mass was completely excised. It appeared to be involving to the superficial  portion of the subcutaneous tissue. That was sent to pathology and grossly appeared to be chronic abscess. We irrigated and good hemostasis was noted. We then closed the wound with interrupted 4-0 Monocryl simple stitch then running 4-0 Monocryl subcuticular stitch. Glue was used for skin closure. The patient was awakened and transferred to the recovery room in awake, stable condition.          Disposition:  Discharged persame day surgery protocol.  She'll  follow up with me in the office in one week.  She was given 12 Percocet 5 tablets to be apparent basis for pain        Miguelito Galvan MD     Date: 5/8/2018  Time: 1:17 PM

## 2018-05-08 NOTE — DISCHARGE INSTRUCTIONS
Minor Surgery  Outpatient Instructions    General Information  You have had a minor surgical procedure and are not expected to require extensive treatment or a long recovery period.  However, the following information/instructions that are listed serve as guidelines to help you recover at home.    Activity:  As tolerated    Hygiene:  May shower    Dressing/Wound Care:  If incision site drains place dressing. Otherwise leave area open to air    Diet:  ***    Important Points:  -Call your doctor to report any of the following:   -unusual or excessive bleeding/drainage   -pain not reduced/controlled by medication   -elevated temperature consistently greater that 100 degrees F   -increased swelling, redness, bruising, or tenderness in surgical area  -Take medications as prescribed by your physician  -If you have any questions, please contact your doctor or the Same Day Surgery Unit at   [phone]  -If a post-operative emergency occurs, report to your nearest ER

## 2018-05-08 NOTE — ANESTHESIA PREPROCEDURE EVALUATION
Anesthesia Evaluation     history of anesthetic complications: PONV  NPO Solid Status: > 8 hours  NPO Liquid Status: > 4 hours           Airway   Mallampati: II  TM distance: >3 FB  Neck ROM: full  No difficulty expected  Dental - normal exam     Pulmonary - negative pulmonary ROS and normal exam    breath sounds clear to auscultation  Cardiovascular - negative cardio ROS  Exercise tolerance: good (4-7 METS)    Rhythm: regular  Rate: normal    (+) murmur,   (-) angina      Neuro/Psych  (+) headaches (migraines occ),     GI/Hepatic/Renal/Endo    (+)  GERD well controlled,  hypothyroidism,     Musculoskeletal     (+) back pain,   Abdominal    Substance History   (+) alcohol use (socially),      OB/GYN negative ob/gyn ROS         Other - negative ROS                       Anesthesia Plan    ASA 2     MAC   total IV anesthesia  intravenous induction   Anesthetic plan and risks discussed with patient and spouse/significant other.

## 2018-05-09 LAB
LAB AP CASE REPORT: NORMAL
LAB AP INTRADEPARTMENTAL CONSULT: NORMAL
Lab: NORMAL
Lab: NORMAL
PATH REPORT.FINAL DX SPEC: NORMAL
PATH REPORT.GROSS SPEC: NORMAL

## 2018-05-16 ENCOUNTER — OFFICE VISIT (OUTPATIENT)
Dept: SURGERY | Facility: CLINIC | Age: 48
End: 2018-05-16

## 2018-05-16 VITALS
SYSTOLIC BLOOD PRESSURE: 106 MMHG | DIASTOLIC BLOOD PRESSURE: 76 MMHG | BODY MASS INDEX: 22.99 KG/M2 | WEIGHT: 138 LBS | HEIGHT: 65 IN

## 2018-05-16 DIAGNOSIS — M79.89 SOFT TISSUE MASS: Primary | ICD-10-CM

## 2018-05-16 PROCEDURE — 99024 POSTOP FOLLOW-UP VISIT: CPT | Performed by: SURGERY

## 2018-05-16 NOTE — PROGRESS NOTES
8d ago   Final Diagnosis   SKIN, RIGHT BUTTOCK:   PILOMATRICOMA.    Electronically signed by Dima Mack MD on 5/9/2018 at 1504     Went over the pathology with the patient.  She did well from her surgery without any complaints.  She had some drainage but none last few days.  Wound is clean its intact appears to be healing nicely there is no redness tenderness fluctuance or drainage.  I went over the pathology with her and she clearly understands this is a benign finding and has been excised.  She'll follow up with us on a when necessary basis

## 2018-05-16 NOTE — PATIENT INSTRUCTIONS
MyPlate from [employer]  The general, healthful diet is based on the 2010 Dietary Guidelines for Americans. The amount of food you need to eat from each food group depends on your age, sex, and level of physical activity and can be individualized by a dietitian. Go to ChooseMyPlate.gov for more information.  What do I need to know about the MyPlate plan?  · Enjoy your food, but eat less.  · Avoid oversized portions.  ¨ ½ of your plate should include fruits and vegetables.  ¨ ¼ of your plate should be grains.  ¨ ¼ of your plate should be protein.  Grains   · Make at least half of your grains whole grains.  · For a 2,000 calorie daily food plan, eat 6 oz every day.  · 1 oz is about 1 slice bread, 1 cup cereal, or ½ cup cooked rice, cereal, or pasta.  Vegetables   · Make half your plate fruits and vegetables.  · For a 2,000 calorie daily food plan, eat 2½ cups every day.  · 1 cup is about 1 cup raw or cooked vegetables or vegetable juice or 2 cups raw leafy greens.  Fruits   · Make half your plate fruits and vegetables.  · For a 2,000 calorie daily food plan, eat 2 cups every day.  · 1 cup is about 1 cup fruit or 100% fruit juice or ½ cup dried fruit.  Protein   · For a 2,000 calorie daily food plan, eat 5½ oz every day.  · 1 oz is about 1 oz meat, poultry, or fish, ¼ cup cooked beans, 1 egg, 1 Tbsp peanut butter, or ½ oz nuts or seeds.  Dairy   · Switch to fat-free or low-fat (1%) milk.  · For a 2,000 calorie daily food plan, eat 3 cups every day.  · 1 cup is about 1 cup milk or yogurt or soy milk (soy beverage), 1½ oz natural cheese, or 2 oz processed cheese.  Fats, Oils, and Empty Calories   · Only small amounts of oils are recommended.  · Empty calories are calories from solid fats or added sugars.  · Compare sodium in foods like soup, bread, and frozen meals. Choose the foods with lower numbers.  · Drink water instead of sugary drinks.  What foods can I eat?  Grains   Whole grains such as whole wheat, quinoa, millet,  and bulgur. Bread, rolls, and pasta made from whole grains. Brown or wild rice. Hot or cold cereals made from whole grains and without added sugar.  Vegetables   All fresh vegetables, especially fresh red, dark green, or orange vegetables. Peas and beans. Low-sodium frozen or canned vegetables prepared without added salt. Low-sodium vegetable juices.  Fruits   All fresh, frozen, and dried fruits. Canned fruit packed in water or fruit juice without added sugar. Fruit juices without added sugar.  Meats and Other Protein Sources   Boiled, baked, or grilled lean meat trimmed of fat. Skinless poultry. Fresh seafood and shellfish. Canned seafood packed in water. Unsalted nuts and unsalted nut butters. Tofu. Dried beans and pea. Eggs.  Dairy   Low-fat or fat-free milk, yogurt, and cheeses.  Sweets and Desserts   Frozen desserts made from low-fat milk.  Fats and Oils   Olive, peanut, and canola oils and margarine. Salad dressing and mayonnaise made from these oils.  Other   Soups and casseroles made from allowed ingredients and without added fat or salt.  The items listed above may not be a complete list of recommended foods or beverages. Contact your dietitian for more options.   What foods are not recommended?  Grains   Sweetened, low-fiber cereals. Packaged baked goods. Snack crackers and chips. Cheese crackers, butter crackers, and biscuits. Frozen waffles, sweet breads, doughnuts, pastries, packaged baking mixes, pancakes, cakes, and cookies.  Vegetables   Regular canned or frozen vegetables or vegetables prepared with salt. Canned tomatoes. Canned tomato sauce. Fried vegetables. Vegetables in cream sauce or cheese sauce.  Fruits   Fruits packed in syrup or made with added sugar.  Meats and Other Protein Sources   Marbled or fatty meats such as ribs. Poultry with skin. Fried meats, poultry, eggs, or fish. Sausages, hot dogs, and deli meats such as pastrami, bologna, or salami.  Dairy   Whole milk, cream, cheeses made  from whole milk, sour cream. Ice cream or yogurt made from whole milk or with added sugar.  Beverages   For adults, no more than one alcoholic drink per day. Regular soft drinks or other sugary beverages. Juice drinks.  Sweets and Desserts   Sugary or fatty desserts, candy, and other sweets.  Fats and Oils   Solid shortening or partially hydrogenated oils. Solid margarine. Margarine that contains trans fats. Butter.  The items listed above may not be a complete list of foods and beverages to avoid. Contact your dietitian for more information.   This information is not intended to replace advice given to you by your health care provider. Make sure you discuss any questions you have with your health care provider.  Document Released: 01/06/2009 Document Revised: 05/25/2017 Document Reviewed: 11/26/2014  [employer] Interactive Patient Education © 2017 [employer]

## 2018-05-22 ENCOUNTER — OFFICE VISIT (OUTPATIENT)
Dept: SURGERY | Facility: CLINIC | Age: 48
End: 2018-05-22

## 2018-05-22 VITALS
BODY MASS INDEX: 23.32 KG/M2 | HEIGHT: 65 IN | DIASTOLIC BLOOD PRESSURE: 62 MMHG | SYSTOLIC BLOOD PRESSURE: 104 MMHG | WEIGHT: 140 LBS

## 2018-05-22 DIAGNOSIS — M79.89 SOFT TISSUE MASS: Primary | ICD-10-CM

## 2018-05-22 PROCEDURE — 99212 OFFICE O/P EST SF 10 MIN: CPT | Performed by: SURGERY

## 2018-05-22 RX ORDER — CEPHALEXIN 500 MG/1
500 CAPSULE ORAL 4 TIMES DAILY
Qty: 28 CAPSULE | Refills: 0 | Status: SHIPPED | OUTPATIENT
Start: 2018-05-22 | End: 2018-10-23

## 2018-05-22 NOTE — PATIENT INSTRUCTIONS
MyPlate from [employer]  The general, healthful diet is based on the 2010 Dietary Guidelines for Americans. The amount of food you need to eat from each food group depends on your age, sex, and level of physical activity and can be individualized by a dietitian. Go to ChooseMyPlate.gov for more information.  What do I need to know about the MyPlate plan?  · Enjoy your food, but eat less.  · Avoid oversized portions.  ¨ ½ of your plate should include fruits and vegetables.  ¨ ¼ of your plate should be grains.  ¨ ¼ of your plate should be protein.  Grains   · Make at least half of your grains whole grains.  · For a 2,000 calorie daily food plan, eat 6 oz every day.  · 1 oz is about 1 slice bread, 1 cup cereal, or ½ cup cooked rice, cereal, or pasta.  Vegetables   · Make half your plate fruits and vegetables.  · For a 2,000 calorie daily food plan, eat 2½ cups every day.  · 1 cup is about 1 cup raw or cooked vegetables or vegetable juice or 2 cups raw leafy greens.  Fruits   · Make half your plate fruits and vegetables.  · For a 2,000 calorie daily food plan, eat 2 cups every day.  · 1 cup is about 1 cup fruit or 100% fruit juice or ½ cup dried fruit.  Protein   · For a 2,000 calorie daily food plan, eat 5½ oz every day.  · 1 oz is about 1 oz meat, poultry, or fish, ¼ cup cooked beans, 1 egg, 1 Tbsp peanut butter, or ½ oz nuts or seeds.  Dairy   · Switch to fat-free or low-fat (1%) milk.  · For a 2,000 calorie daily food plan, eat 3 cups every day.  · 1 cup is about 1 cup milk or yogurt or soy milk (soy beverage), 1½ oz natural cheese, or 2 oz processed cheese.  Fats, Oils, and Empty Calories   · Only small amounts of oils are recommended.  · Empty calories are calories from solid fats or added sugars.  · Compare sodium in foods like soup, bread, and frozen meals. Choose the foods with lower numbers.  · Drink water instead of sugary drinks.  What foods can I eat?  Grains   Whole grains such as whole wheat, quinoa, millet,  and bulgur. Bread, rolls, and pasta made from whole grains. Brown or wild rice. Hot or cold cereals made from whole grains and without added sugar.  Vegetables   All fresh vegetables, especially fresh red, dark green, or orange vegetables. Peas and beans. Low-sodium frozen or canned vegetables prepared without added salt. Low-sodium vegetable juices.  Fruits   All fresh, frozen, and dried fruits. Canned fruit packed in water or fruit juice without added sugar. Fruit juices without added sugar.  Meats and Other Protein Sources   Boiled, baked, or grilled lean meat trimmed of fat. Skinless poultry. Fresh seafood and shellfish. Canned seafood packed in water. Unsalted nuts and unsalted nut butters. Tofu. Dried beans and pea. Eggs.  Dairy   Low-fat or fat-free milk, yogurt, and cheeses.  Sweets and Desserts   Frozen desserts made from low-fat milk.  Fats and Oils   Olive, peanut, and canola oils and margarine. Salad dressing and mayonnaise made from these oils.  Other   Soups and casseroles made from allowed ingredients and without added fat or salt.  The items listed above may not be a complete list of recommended foods or beverages. Contact your dietitian for more options.   What foods are not recommended?  Grains   Sweetened, low-fiber cereals. Packaged baked goods. Snack crackers and chips. Cheese crackers, butter crackers, and biscuits. Frozen waffles, sweet breads, doughnuts, pastries, packaged baking mixes, pancakes, cakes, and cookies.  Vegetables   Regular canned or frozen vegetables or vegetables prepared with salt. Canned tomatoes. Canned tomato sauce. Fried vegetables. Vegetables in cream sauce or cheese sauce.  Fruits   Fruits packed in syrup or made with added sugar.  Meats and Other Protein Sources   Marbled or fatty meats such as ribs. Poultry with skin. Fried meats, poultry, eggs, or fish. Sausages, hot dogs, and deli meats such as pastrami, bologna, or salami.  Dairy   Whole milk, cream, cheeses made  from whole milk, sour cream. Ice cream or yogurt made from whole milk or with added sugar.  Beverages   For adults, no more than one alcoholic drink per day. Regular soft drinks or other sugary beverages. Juice drinks.  Sweets and Desserts   Sugary or fatty desserts, candy, and other sweets.  Fats and Oils   Solid shortening or partially hydrogenated oils. Solid margarine. Margarine that contains trans fats. Butter.  The items listed above may not be a complete list of foods and beverages to avoid. Contact your dietitian for more information.   This information is not intended to replace advice given to you by your health care provider. Make sure you discuss any questions you have with your health care provider.  Document Released: 01/06/2009 Document Revised: 05/25/2017 Document Reviewed: 11/26/2014  [employer] Interactive Interactive Patient Education © 2017 [employer]

## 2018-05-22 NOTE — PROGRESS NOTES
Patient is now over 2 weeks out from excision of a pilomatricoma from her right buttock.  Patient was doing well to few days ago she had some drainage in the area became more swollen and red and tender.  Swelling has gone down with the drainage but is still slightly red and mildly tender to palpation no real drainage present currently.  No significant swelling or fluctuance.  Suspect the skin edges came loose this is in a place where it is easily traumatized and skin would be pulled apart.  We keep the area clean and allowed to drain it and wants to drain.  We'll start her on Keflex for the next 7 days.  Expect this to continue to improve and if not she will let us know

## 2018-10-19 ENCOUNTER — LAB (OUTPATIENT)
Dept: LAB | Facility: HOSPITAL | Age: 48
End: 2018-10-19

## 2018-10-19 DIAGNOSIS — Z00.00 ANNUAL PHYSICAL EXAM: ICD-10-CM

## 2018-10-19 DIAGNOSIS — E03.9 ACQUIRED HYPOTHYROIDISM: Chronic | ICD-10-CM

## 2018-10-19 LAB
ALBUMIN SERPL-MCNC: 4.7 G/DL (ref 3.4–4.8)
ALBUMIN/GLOB SERPL: 1.6 G/DL (ref 1.1–1.8)
ALP SERPL-CCNC: 43 U/L (ref 38–126)
ALT SERPL W P-5'-P-CCNC: 31 U/L (ref 9–52)
ANION GAP SERPL CALCULATED.3IONS-SCNC: 11 MMOL/L (ref 5–15)
ARTICHOKE IGE QN: 108 MG/DL (ref 1–129)
AST SERPL-CCNC: 26 U/L (ref 14–36)
BILIRUB SERPL-MCNC: 1 MG/DL (ref 0.2–1.3)
BUN BLD-MCNC: 12 MG/DL (ref 7–21)
BUN/CREAT SERPL: 21.8 (ref 7–25)
CALCIUM SPEC-SCNC: 9.3 MG/DL (ref 8.4–10.2)
CHLORIDE SERPL-SCNC: 98 MMOL/L (ref 95–110)
CHOLEST SERPL-MCNC: 207 MG/DL (ref 0–199)
CO2 SERPL-SCNC: 27 MMOL/L (ref 22–31)
CREAT BLD-MCNC: 0.55 MG/DL (ref 0.5–1)
GFR SERPL CREATININE-BSD FRML MDRD: 118 ML/MIN/1.73 (ref 58–135)
GLOBULIN UR ELPH-MCNC: 3 GM/DL (ref 2.3–3.5)
GLUCOSE BLD-MCNC: 89 MG/DL (ref 60–100)
HDLC SERPL-MCNC: 82 MG/DL (ref 60–200)
LDLC/HDLC SERPL: 1.38 {RATIO} (ref 0–3.22)
POTASSIUM BLD-SCNC: 4.2 MMOL/L (ref 3.5–5.1)
PROT SERPL-MCNC: 7.7 G/DL (ref 6.3–8.6)
SODIUM BLD-SCNC: 136 MMOL/L (ref 137–145)
T4 FREE SERPL-MCNC: 1.38 NG/DL (ref 0.78–2.19)
TRIGL SERPL-MCNC: 60 MG/DL (ref 20–199)
TSH SERPL DL<=0.05 MIU/L-ACNC: 0.19 MIU/ML (ref 0.46–4.68)

## 2018-10-19 PROCEDURE — 84439 ASSAY OF FREE THYROXINE: CPT

## 2018-10-19 PROCEDURE — 84480 ASSAY TRIIODOTHYRONINE (T3): CPT

## 2018-10-19 PROCEDURE — 80053 COMPREHEN METABOLIC PANEL: CPT

## 2018-10-19 PROCEDURE — 36415 COLL VENOUS BLD VENIPUNCTURE: CPT

## 2018-10-19 PROCEDURE — 80061 LIPID PANEL: CPT

## 2018-10-19 PROCEDURE — 84443 ASSAY THYROID STIM HORMONE: CPT

## 2018-10-22 DIAGNOSIS — Z12.31 ENCOUNTER FOR SCREENING MAMMOGRAM FOR MALIGNANT NEOPLASM OF BREAST: Primary | ICD-10-CM

## 2018-10-23 ENCOUNTER — OFFICE VISIT (OUTPATIENT)
Dept: FAMILY MEDICINE CLINIC | Facility: CLINIC | Age: 48
End: 2018-10-23

## 2018-10-23 VITALS
HEART RATE: 66 BPM | WEIGHT: 128 LBS | SYSTOLIC BLOOD PRESSURE: 105 MMHG | OXYGEN SATURATION: 99 % | HEIGHT: 66 IN | DIASTOLIC BLOOD PRESSURE: 70 MMHG | BODY MASS INDEX: 20.57 KG/M2

## 2018-10-23 DIAGNOSIS — E03.9 ACQUIRED HYPOTHYROIDISM: Primary | Chronic | ICD-10-CM

## 2018-10-23 LAB — T3 SERPL-MCNC: 101 NG/DL (ref 97–169)

## 2018-10-23 PROCEDURE — 99396 PREV VISIT EST AGE 40-64: CPT | Performed by: GENERAL PRACTICE

## 2018-10-23 RX ORDER — LEVOTHYROXINE SODIUM 88 UG/1
88 TABLET ORAL DAILY
Qty: 90 TABLET | Refills: 3 | Status: SHIPPED | OUTPATIENT
Start: 2018-10-23 | End: 2019-10-30 | Stop reason: SDUPTHER

## 2018-10-23 NOTE — PATIENT INSTRUCTIONS
Decrease levothyroxine by 1/2 tab a week    Go to lab in 2-3 months and repeat tests    [place]ro[place] for hot flashes

## 2018-10-23 NOTE — PROGRESS NOTES
Subjective   Vanessa Beach is a 48 y.o. female.     Chief Complaint   Patient presents with   • Annual Exam   • Hypothyroidism   • Vitamin D Deficiency       History of Present Illness     For annual wellness exam.  Labs reviewed. Due for mammogram.     The following portions of the patient's history were reviewed and updated as appropriate: allergies, current medications, past family and social history and problem list.    Outpatient Medications Prior to Visit   Medication Sig Dispense Refill   • ACETAMINOPHEN PO Take  by mouth.     • Cholecalciferol 1000 UNITS tablet Take 1,000 Units by mouth daily.     • omeprazole (priLOSEC) 20 MG capsule Take 20 mg by mouth Daily.     • levothyroxine (SYNTHROID, LEVOTHROID) 88 MCG tablet Take 88 mcg by mouth Daily.     • cephalexin (KEFLEX) 500 MG capsule Take 1 capsule by mouth 4 (Four) Times a Day. 28 capsule 0     No facility-administered medications prior to visit.        Review of Systems   Constitutional: Negative.  Negative for chills, fatigue, fever and unexpected weight change.   HENT: Negative.  Negative for congestion, ear pain, hearing loss, nosebleeds, rhinorrhea, sneezing, sore throat and tinnitus.    Eyes: Negative.  Negative for discharge.   Respiratory: Negative.  Negative for cough, shortness of breath and wheezing.    Cardiovascular: Negative.  Negative for chest pain and palpitations.   Gastrointestinal: Negative.  Negative for abdominal pain, constipation, diarrhea, nausea and vomiting.   Endocrine: Negative.    Genitourinary: Negative.  Negative for dysuria, frequency and urgency.   Musculoskeletal: Negative.  Negative for arthralgias, back pain, joint swelling, myalgias and neck pain.   Skin: Negative.  Negative for rash.   Allergic/Immunologic: Negative.    Neurological: Negative.  Negative for dizziness, weakness, numbness and headaches.   Hematological: Negative.  Negative for adenopathy.   Psychiatric/Behavioral: Negative.  Negative for dysphoric  "mood and sleep disturbance. The patient is not nervous/anxious.        Objective     Visit Vitals  /70 (BP Location: Left arm, Patient Position: Sitting, Cuff Size: Adult)   Pulse 66   Ht 166.4 cm (65.5\")   Wt 58.1 kg (128 lb)   SpO2 99%   BMI 20.98 kg/m²     Physical Exam   Constitutional: She is oriented to person, place, and time. She appears well-developed and well-nourished. No distress.   HENT:   Head: Normocephalic and atraumatic.   Nose: Nose normal.   Mouth/Throat: Oropharynx is clear and moist.   Eyes: Pupils are equal, round, and reactive to light. Conjunctivae and EOM are normal. Right eye exhibits no discharge. Left eye exhibits no discharge.   Neck: No tracheal deviation present. No thyromegaly present.   Cardiovascular: Normal rate, regular rhythm, normal heart sounds and intact distal pulses.    No murmur heard.  Pulmonary/Chest: Effort normal and breath sounds normal. No respiratory distress. She has no wheezes. She has no rales. She exhibits no tenderness. Right breast exhibits no inverted nipple, no mass, no nipple discharge, no skin change and no tenderness. Left breast exhibits no inverted nipple, no mass, no nipple discharge, no skin change and no tenderness.   Abdominal: Soft. Bowel sounds are normal. She exhibits no distension and no mass. There is no tenderness. No hernia.   Musculoskeletal: Normal range of motion. She exhibits no deformity.   Lymphadenopathy:     She has no cervical adenopathy.   Neurological: She is alert and oriented to person, place, and time. She has normal reflexes.   Skin: Skin is warm and dry.   Psychiatric: She has a normal mood and affect. Her behavior is normal. Judgment and thought content normal.   Nursing note and vitals reviewed.    Results for orders placed or performed in visit on 10/19/18   Comprehensive Metabolic Panel   Result Value Ref Range    Glucose 89 60 - 100 mg/dL    BUN 12 7 - 21 mg/dL    Creatinine 0.55 0.50 - 1.00 mg/dL    Sodium 136 (L) " 137 - 145 mmol/L    Potassium 4.2 3.5 - 5.1 mmol/L    Chloride 98 95 - 110 mmol/L    CO2 27.0 22.0 - 31.0 mmol/L    Calcium 9.3 8.4 - 10.2 mg/dL    Total Protein 7.7 6.3 - 8.6 g/dL    Albumin 4.70 3.40 - 4.80 g/dL    ALT (SGPT) 31 9 - 52 U/L    AST (SGOT) 26 14 - 36 U/L    Alkaline Phosphatase 43 38 - 126 U/L    Total Bilirubin 1.0 0.2 - 1.3 mg/dL    eGFR Non  Amer 118 58 - 135 mL/min/1.73    Globulin 3.0 2.3 - 3.5 gm/dL    A/G Ratio 1.6 1.1 - 1.8 g/dL    BUN/Creatinine Ratio 21.8 7.0 - 25.0    Anion Gap 11.0 5.0 - 15.0 mmol/L   Lipid Panel   Result Value Ref Range    Total Cholesterol 207 (H) 0 - 199 mg/dL    Triglycerides 60 20 - 199 mg/dL    HDL Cholesterol 82 60 - 200 mg/dL    LDL Cholesterol  108 1 - 129 mg/dL    LDL/HDL Ratio 1.38 0.00 - 3.22   TSH   Result Value Ref Range    TSH 0.190 (L) 0.460 - 4.680 mIU/mL   T4, Free   Result Value Ref Range    Free T4 1.38 0.78 - 2.19 ng/dL      Assessment/Plan   Problem List Items Addressed This Visit        Endocrine    Acquired hypothyroidism - Primary (Chronic)    Relevant Medications    levothyroxine (SYNTHROID, LEVOTHROID) 88 MCG tablet    Other Relevant Orders    TSH    T4, Free    T3          Will notify regarding results. Decrease levothyroxine by 1/2 tab a week. Go to lab in 2-3 months and repeat tests    New Medications Ordered This Visit   Medications   • levothyroxine (SYNTHROID, LEVOTHROID) 88 MCG tablet     Sig: Take 1 tablet by mouth Daily.     Dispense:  90 tablet     Refill:  3     Return in about 1 year (around 10/23/2019) for Annual physical.

## 2018-10-25 DIAGNOSIS — Z12.31 ENCOUNTER FOR SCREENING MAMMOGRAM FOR MALIGNANT NEOPLASM OF BREAST: Primary | ICD-10-CM

## 2019-09-11 ENCOUNTER — OFFICE VISIT (OUTPATIENT)
Dept: FAMILY MEDICINE CLINIC | Facility: CLINIC | Age: 49
End: 2019-09-11

## 2019-09-11 VITALS
BODY MASS INDEX: 21.84 KG/M2 | HEIGHT: 66 IN | WEIGHT: 135.9 LBS | SYSTOLIC BLOOD PRESSURE: 108 MMHG | DIASTOLIC BLOOD PRESSURE: 70 MMHG | HEART RATE: 76 BPM | OXYGEN SATURATION: 99 %

## 2019-09-11 DIAGNOSIS — M71.50 TRAUMATIC BURSITIS: ICD-10-CM

## 2019-09-11 DIAGNOSIS — M25.552 LEFT HIP PAIN: Primary | ICD-10-CM

## 2019-09-11 PROCEDURE — 99213 OFFICE O/P EST LOW 20 MIN: CPT | Performed by: GENERAL PRACTICE

## 2019-09-11 RX ORDER — MAGNESIUM OXIDE 400 MG/1
400 TABLET ORAL DAILY
COMMUNITY

## 2019-09-11 NOTE — PROGRESS NOTES
Subjective   Vanessa Beach is a 49 y.o. female.   Chief Complaint   Patient presents with   • Fall   • Hip Injury     left     6 weeks ago had severe back pain for 3 days, had been olivier all day. Saw chiropractor and did not help. Took medrol dose pack and did help. Has been seeing chiropractor   since. Fell 4 weeks ago   Hip Injury   This is a new problem. The current episode started more than 1 month ago. The problem has been gradually improving. Associated symptoms include arthralgias. Pertinent negatives include no abdominal pain, chest pain, chills, congestion, coughing, fatigue, fever, headaches, joint swelling, myalgias, nausea, neck pain, numbness, rash, sore throat, vomiting or weakness. The symptoms are aggravated by standing. She has tried NSAIDs and rest (chiro) for the symptoms. The treatment provided mild relief.   Fall   The accident occurred more than 1 week ago. The fall occurred while standing. She fell from a height of 3 to 5 ft. She landed on hard floor. The point of impact was the left hip. The pain is present in the left hip. The pain is moderate. The symptoms are aggravated by ambulation and movement. Pertinent negatives include no abdominal pain, fever, headaches, nausea, numbness or vomiting. She has tried NSAID and ice for the symptoms. The treatment provided moderate relief.      The following portions of the patient's history were reviewed and updated as appropriate: allergies, current medications, past social history and problem list.    Outpatient Medications Prior to Visit   Medication Sig Dispense Refill   • magnesium oxide (MAG-OX) 400 MG tablet Take 400 mg by mouth Daily.     • ACETAMINOPHEN PO Take  by mouth.     • Cholecalciferol 1000 UNITS tablet Take 1,000 Units by mouth daily.     • levothyroxine (SYNTHROID, LEVOTHROID) 88 MCG tablet Take 1 tablet by mouth Daily. 90 tablet 3   • omeprazole (priLOSEC) 20 MG capsule Take 20 mg by mouth Daily.       No facility-administered  "medications prior to visit.        Review of Systems   Constitutional: Negative.  Negative for chills, fatigue, fever and unexpected weight change.   HENT: Negative.  Negative for congestion, ear pain, hearing loss, nosebleeds, rhinorrhea, sneezing, sore throat and tinnitus.    Eyes: Negative.  Negative for discharge.   Respiratory: Negative.  Negative for cough, shortness of breath and wheezing.    Cardiovascular: Negative.  Negative for chest pain and palpitations.   Gastrointestinal: Negative.  Negative for abdominal pain, constipation, diarrhea, nausea and vomiting.   Endocrine: Negative.    Genitourinary: Negative.  Negative for dysuria, frequency and urgency.   Musculoskeletal: Positive for arthralgias. Negative for back pain, joint swelling, myalgias and neck pain.   Skin: Negative.  Negative for rash.   Allergic/Immunologic: Negative.    Neurological: Negative.  Negative for dizziness, weakness, numbness and headaches.   Hematological: Negative.  Negative for adenopathy.   Psychiatric/Behavioral: Negative.  Negative for dysphoric mood and sleep disturbance. The patient is not nervous/anxious.        Objective   Visit Vitals  /70 (BP Location: Left arm)   Pulse 76   Ht 166.4 cm (65.5\")   Wt 61.6 kg (135 lb 14.4 oz)   SpO2 99%   BMI 22.27 kg/m²     Physical Exam   Constitutional: She is oriented to person, place, and time. She appears well-developed and well-nourished. No distress.   HENT:   Head: Normocephalic and atraumatic.   Nose: Nose normal.   Mouth/Throat: Oropharynx is clear and moist.   Eyes: Conjunctivae and EOM are normal. Pupils are equal, round, and reactive to light. Right eye exhibits no discharge. Left eye exhibits no discharge.   Neck: No thyromegaly present.   Cardiovascular: Normal rate, regular rhythm, normal heart sounds and intact distal pulses.   Pulmonary/Chest: Effort normal and breath sounds normal.   Musculoskeletal:        Left hip: She exhibits tenderness.        " Legs:  Lymphadenopathy:     She has no cervical adenopathy.   Neurological: She is alert and oriented to person, place, and time.   Skin: Skin is warm and dry.   Psychiatric: She has a normal mood and affect.   Nursing note and vitals reviewed.      Assessment/Plan   Problem List Items Addressed This Visit     None      Visit Diagnoses     Left hip pain    -  Primary    Relevant Orders    Ambulatory Referral to Physical Therapy Evaluate and treat    XR Hip With or Without Pelvis 2 - 3 View Left    Traumatic bursitis             Recheck if not improving.  Will notify regarding results.     No orders of the defined types were placed in this encounter.    Return if symptoms worsen or fail to improve, for Next scheduled follow up.

## 2019-09-12 ENCOUNTER — HOSPITAL ENCOUNTER (OUTPATIENT)
Dept: PHYSICAL THERAPY | Facility: HOSPITAL | Age: 49
Setting detail: THERAPIES SERIES
Discharge: HOME | End: 2019-09-12

## 2019-09-12 DIAGNOSIS — M25.552 LEFT HIP PAIN: ICD-10-CM

## 2019-09-12 DIAGNOSIS — M54.5 LOW BACK PAIN, UNSPECIFIED BACK PAIN LATERALITY, UNSPECIFIED CHRONICITY, WITH SCIATICA PRESENCE UNSPECIFIED: Primary | ICD-10-CM

## 2019-09-12 PROCEDURE — 97161 PT EVAL LOW COMPLEX 20 MIN: CPT | Performed by: PHYSICAL THERAPIST

## 2019-09-12 PROCEDURE — 97140 MANUAL THERAPY 1/> REGIONS: CPT | Performed by: PHYSICAL THERAPIST

## 2019-09-12 NOTE — THERAPY EVALUATION
Outpatient Physical Therapy Ortho Initial Evaluation  [facility]     Patient Name: Vanessa Beach  : 1970  MRN: [id number]  Today's Date: 2019      Visit Date: 2019    Patient Active Problem List   Diagnosis   • Plantar fasciitis   • Acquired hypothyroidism   • Soft tissue mass        Past Medical History:   Diagnosis Date   • Abnormal mammography    • Acquired hypothyroidism    • Acute bronchitis    • Acute otitis externa    • Allergic reaction     Allergic reaction to substance - Poison IVY      • Alopecia    • Aphthous ulcer of mouth    • Contact dermatitis due to plants, except food    • Cough    • Fever    • GERD (gastroesophageal reflux disease)    • Herpes zoster    • PONV (postoperative nausea and vomiting)    • Presbyopia    • Upper respiratory infection    • Vitamin D deficiency    • Wheezing         Past Surgical History:   Procedure Laterality Date   • HYSTERECTOMY     • INJECTION OF MEDICATION  2014    KENALOG(2)   • LAPAROSCOPIC TUBAL LIGATION     • MASS EXCISION Right 2018    Procedure: EXCISE SOFT TISSUE MASS RIGHT BUTTOCK       (ANESTHESIA REQUEST YUEN);  Surgeon: Miguelito Galvan MD;  Location: [facility];  Service: General   • THYROIDECTOMY, PARTIAL  2000       Visit Dx:     ICD-10-CM ICD-9-CM   1. Low back pain, unspecified back pain laterality, unspecified chronicity, with sciatica presence unspecified M54.5 724.2   2. Left hip pain M25.552 719.45             PT Ortho     Row Name 19 1800       Subjective Comments    Subjective Comments  Patient is present today with reports of low back pain that had started many months back and had been bothering the right hip and then reports falling on her boat and hitting left hip and since that time the left him has really been limiting running and causing pain in the mornings and during the day. Patient also notes continued low back pain. Notes pains have limited the amount of tolerated standing,  limited shoe selections and effected sleeping. Has tried seeing chiropractor that gives temporary relief. Has taken anti-inflammatories with mild changes.   -BB       Posture/Observations    Posture/Observations Comments  posterior pelvic tilt and decreased lumbar lordosis in standing, scissoring noted in running, R>L heel strike in gait with hard overpronation moment as seen with worn tread on shoes.   -BB       Special Tests/Palpation    Special Tests/Palpation  Lumbar/SI  -BB       Lumbar/SI Special Tests    Trendelenburg Test (Gluteus Medius Weakness)  Right:;Positive  -BB    Seated Flexion Test (SI Dysfunction)  Positive  -BB    SLR (Neural Tension)  Negative  -BB    SI Compression Test (SI Dysfunction)  Negative  -BB    SI Distraction Test (SI Dysfunction)  Negative  -BB    Thigh Thrust/Posterior Shear (SI Dysfunction)  Negative  -BB       Lumbosacral Palpation    Lumbosacral Palpation?  Yes  -BB    Lumbosacral Segment  Tender  -BB    Piriformis  Bilateral:;Tender;Guarded/taut  -BB    Gluteus Kingsley  Bilateral:;Tender  -BB    Quadratus Lumborum  Left:;Tender  -BB    Erector Spinae (Paraspinals)  Bilateral:;Guarded/taut  -BB    Greater Tuberosity  Left:;Tender  -BB       General ROM    GENERAL ROM COMMENTS  Full AROM noted without increased pain of hip and lumbar spine   -BB       MMT (Manual Muscle Testing)    General MMT Comments  hip abduction 4-/5, hip flexion 4/5   -BB       Transfers    Comment (Transfers)  Independent in all   -BB       Gait/Stairs Assessment/Training    Comment (Gait/Stairs)  no significant antalgics noted today.   -BB      User Key  (r) = Recorded By, (t) = Taken By, (c) = Cosigned By    Initials Name Provider Type    Vicky Gonzalez PT Physical Therapist                      Therapy Education  Education Details: to run 1 mile with rest days in between, hip abductor strength, ice,   Given: HEP, Symptoms/condition management  Program: New  How Provided: Verbal  Provided to:  Patient  Level of Understanding: Verbalized     PT OP Goals     Row Name 09/12/19 1800          PT Short Term Goals    STG Date to Achieve  10/03/19  -BB     STG 1  hip abduction of 4+/5   -BB     STG 2  hip flexion 5/5   -BB     STG 3  run 3-5 miles without increased pain per report   -BB     STG 4  Reports of no pain in AM.   -BB        Time Calculation    PT Goal Re-Cert Due Date  10/03/19  -BB       User Key  (r) = Recorded By, (t) = Taken By, (c) = Cosigned By    Initials Name Provider Type    Vicky Gonzalez, PT Physical Therapist          PT Assessment/Plan     Row Name 09/12/19 1800          PT Assessment    Functional Limitations  Impaired gait;Performance in leisure activities;Limitations in functional capacity and performance  -BB     Impairments  Gait;Pain;Muscle strength;Poor body mechanics  -BB     Assessment Comments  Patient presents today with complaints of left lateral hip pain and low back pain after multifactor incidents. Patient appears to have a upslip of the left SI, tenderness of the hip joint with suspects of bursitis related issues and pain of the L4-5 5-S1 segements. Patient is also noted to have significant weakness of the gluteals, hip abductors and core functionally with MMT and seen in gait analysis with a progressing scissoring noted. Patient is alos noted to have a overpronation moment in stance R>L. Patient could benefit from skilled PT to address deficits and decrease pain and return to recreational activities.   -BB     Rehab Potential  Excellent  -BB     Patient/caregiver participated in establishment of treatment plan and goals  Yes  -BB     Patient would benefit from skilled therapy intervention  Yes  -BB        PT Plan    PT Frequency  1x/week;2x/week  -BB     Predicted Duration of Therapy Intervention (Therapy Eval)  4 weeks   -BB     Planned CPT's?  PT EVAL LOW COMPLEXITY: 29420;PT RE-EVAL: 78534;PT THER PROC EA 15 MIN: 55724;PT THER ACT EA 15 MIN: 22526;PT MANUAL THERAPY  EA 15 MIN: 34292;PT SELF CARE/HOME MGMT/TRAIN EA 15: 35578;PT ULTRASOUND EA 15 MIN: 50547;PT ELECTRICAL STIM UNATTEND: ;PT TRACTION LUMBAR: 79728;PT THER SUPP EA 15 MIN;PT ORTHOTIC MGMT/TRAIN EA 15 MIN: 94624  -BB     PT Plan Comments  manual therapy, core stabilization, hip mobilizations, manual lumbar traction, hip strengthening, modalities PRN, dry needling  -BB       User Key  (r) = Recorded By, (t) = Taken By, (c) = Cosigned By    Initials Name Provider Type    Vicky Gonzalez, PT Physical Therapist            OP Exercises     Row Name 09/12/19 1800             Subjective Comments    Subjective Comments  Patient is present today with reports of low back pain that had started many months back and had been bothering the right hip and then reports falling on her boat and hitting left hip and since that time the left him has really been limiting running and causing pain in the mornings and during the day. Patient also notes continued low back pain. Notes pains have limited the amount of tolerated standing, limited shoe selections and effected sleeping. Has tried seeing chiropractor that gives temporary relief. Has taken anti-inflammatories with mild changes.   -BB         Subjective Pain    Able to rate subjective pain?  yes  -BB      Pre-Treatment Pain Level  0  -BB      Post-Treatment Pain Level  0  -BB         Exercise 1    Exercise Name 1  eval   -BB         Exercise 2    Exercise Name 2  gait analysis   -BB      Time 2  2'  -BB         Exercise 3    Exercise Name 3  jog half mile   -BB      Additional Comments  non PT time   -BB         Exercise 4    Exercise Name 4  left long arm distraction   -BB      Time 4  2'  -BB         Exercise 5    Exercise Name 5  left lateral hip distraction   -BB      Time 5  2'  -BB         Exercise 6    Exercise Name 6  lumbar manual distraction with towel   -BB      Time 6  5'  -BB         Exercise 7    Exercise Name 7  SL gapping of L3-5 and illium   -BB      Time 7  4'   -BRADY        User Key  (r) = Recorded By, (t) = Taken By, (c) = Cosigned By    Initials Name Provider Type    Vicky Gonzalez PT Physical Therapist                        Outcome Measure Options: Lower Extremity Functional Scale (LEFS)  Lower Extremity Functional Index  Any of your usual work, housework or school activities: A little bit of difficulty  Your usual hobbies, recreational or sporting activities: Quite a bit of difficulty  Getting into or out of the bath: No difficulty  Walking between rooms: No difficulty  Putting on your shoes or socks: A little bit of difficulty  Squatting: No difficulty  Lifting an object, like a bag of groceries from the floor: No difficulty  Performing light activities around your home: Moderate difficulty  Performing heavy activities around your home: No difficulty  Getting into or out of a car: A little bit of difficulty  Walking 2 blocks: A little bit of difficulty  Walking a mile: No difficulty  Going up or down 10 stairs (about 1 flight of stairs): A little bit of difficulty  Standing for 1 hour: No difficulty  Sitting for 1 hour: No difficulty  Running on even ground: Moderate difficulty  Running on uneven ground: Moderate difficulty  Making sharp turns while running fast: Moderate difficulty  Hopping: Moderate difficulty  Rolling over in bed: A little bit of difficulty  Total: 61      Time Calculation:     Start Time: 1640  Stop Time: 1720  Time Calculation (min): 40 min  PT Non-Billable Time (min): 10 min     Therapy Charges for Today     Code Description Service Date Service Provider Modifiers Qty    54459846918 HC PT EVAL LOW COMPLEXITY 1 9/12/2019 Vicky Pickering, PT GP 1    06046346435 HC PT MANUAL THERAPY EA 15 MIN 9/12/2019 Vicky Pickering, PT GP 1    49158791030 HC PT THER SUPP EA 15 MIN 9/12/2019 Vicky Pickering, PT GP 1          PT G-Codes  Outcome Measure Options: Lower Extremity Functional Scale (LEFS)  Total: 61         Vicky Pickering PT  9/12/2019

## 2019-09-17 ENCOUNTER — HOSPITAL ENCOUNTER (OUTPATIENT)
Dept: PHYSICAL THERAPY | Facility: HOSPITAL | Age: 49
Setting detail: THERAPIES SERIES
Discharge: HOME | End: 2019-09-17

## 2019-09-17 DIAGNOSIS — M54.5 LOW BACK PAIN, UNSPECIFIED BACK PAIN LATERALITY, UNSPECIFIED CHRONICITY, WITH SCIATICA PRESENCE UNSPECIFIED: Primary | ICD-10-CM

## 2019-09-17 DIAGNOSIS — M25.552 LEFT HIP PAIN: ICD-10-CM

## 2019-09-17 PROCEDURE — 97140 MANUAL THERAPY 1/> REGIONS: CPT | Performed by: PHYSICAL THERAPIST

## 2019-09-17 NOTE — THERAPY TREATMENT NOTE
Outpatient Physical Therapy Ortho Treatment Note  [facility]     Patient Name: Vanessa Beach  : 1970  MRN: [id number]  Today's Date: 2019      Visit Date: 2019    Visit Dx:    ICD-10-CM ICD-9-CM   1. Low back pain, unspecified back pain laterality, unspecified chronicity, with sciatica presence unspecified M54.5 724.2   2. Left hip pain M25.552 719.45       Patient Active Problem List   Diagnosis   • Plantar fasciitis   • Acquired hypothyroidism   • Soft tissue mass        Past Medical History:   Diagnosis Date   • Abnormal mammography    • Acquired hypothyroidism    • Acute bronchitis    • Acute otitis externa    • Allergic reaction     Allergic reaction to substance - Poison IVY      • Alopecia    • Aphthous ulcer of mouth    • Contact dermatitis due to plants, except food    • Cough    • Fever    • GERD (gastroesophageal reflux disease)    • Herpes zoster    • PONV (postoperative nausea and vomiting)    • Presbyopia    • Upper respiratory infection    • Vitamin D deficiency    • Wheezing         Past Surgical History:   Procedure Laterality Date   • HYSTERECTOMY     • INJECTION OF MEDICATION  2014    KENALOG(2)   • LAPAROSCOPIC TUBAL LIGATION     • MASS EXCISION Right 2018    Procedure: EXCISE SOFT TISSUE MASS RIGHT BUTTOCK       (ANESTHESIA REQUEST YUEN);  Surgeon: Miguelito Galvan MD;  Location: [facility];  Service: General   • THYROIDECTOMY, PARTIAL  2000       PT Ortho     Row Name 19 7301       Subjective Comments    Subjective Comments  present with reports of left lateral hip pain and pain appears to radiate to lateral leg and notes low back feeling okay.   -BB       Precautions and Contraindications    Precautions/Limitations  no known precautions/limitations  -BB       Subjective Pain    Able to rate subjective pain?  yes  -BB    Pre-Treatment Pain Level  3  -BB    Post-Treatment Pain Level  3  -BB    Subjective Pain Comment  left hip pain  -BB        Posture/Observations    Posture/Observations Comments  Left up slip of SI noted. TrP and tone of hamstring   -BB       Special Tests/Palpation    Special Tests/Palpation  -- ttp of QL and ITB/HS laterally  -BB      User Key  (r) = Recorded By, (t) = Taken By, (c) = Cosigned By    Initials Name Provider Type    Vicky Gonzalez PT Physical Therapist                      PT Assessment/Plan     Row Name 09/17/19 0584          PT Assessment    Assessment Comments  Appears to have responded well dry needling. Patient could benefit from dry needling to QL again. Patient is compliant in HEP.   -BB        PT Plan    PT Frequency  1x/week;2x/week  -BB     Predicted Duration of Therapy Intervention (Therapy Eval)  4 weeks   -BB     PT Plan Comments  address foot positioning next   -BB       User Key  (r) = Recorded By, (t) = Taken By, (c) = Cosigned By    Initials Name Provider Type    Vicky Gonzalez PT Physical Therapist            OP Exercises     Row Name 09/17/19 5057             Subjective Comments    Subjective Comments  present with reports of left lateral hip pain and pain appears to radiate to lateral leg and notes low back feeling okay.   -BB         Subjective Pain    Able to rate subjective pain?  yes  -BB      Pre-Treatment Pain Level  3  -BB      Post-Treatment Pain Level  3  -BB      Subjective Pain Comment  left hip pain  -BB         Exercise 1    Exercise Name 1  dry needling to ITB and lateral hamstring  -BB      Additional Comments  strong twitches noted   -BB         Exercise 2    Exercise Name 2  left long distraction with towel   -BB      Time 2  3'  -BB         Exercise 3    Exercise Name 3  manual distraction of gait belt  -BB      Time 3  8'  -BB        User Key  (r) = Recorded By, (t) = Taken By, (c) = Cosigned By    Initials Name Provider Type    Vicky Gonzalez PT Physical Therapist                       PT OP Goals     Row Name 09/17/19 4714          PT Short Term Goals    STG Date  to Achieve  10/03/19  -BB     STG 1  hip abduction of 4+/5   -BB     STG 1 Progress  Not Met  -BB     STG 2  hip flexion 5/5   -BB     STG 2 Progress  Not Met  -BB     STG 3  run 3-5 miles without increased pain per report   -BB     STG 3 Progress  Not Met  -BB     STG 4  Reports of no pain in AM.   -BB     STG 4 Progress  Not Met  -BB        Time Calculation    PT Goal Re-Cert Due Date  10/03/19  -BB       User Key  (r) = Recorded By, (t) = Taken By, (c) = Cosigned By    Initials Name Provider Type    Vicky Gonzalez PT Physical Therapist                         Time Calculation:   Start Time: 1347  Stop Time: 1430  Time Calculation (min): 43 min  Therapy Charges for Today     Code Description Service Date Service Provider Modifiers Qty    34941230971 HC PT MANUAL THERAPY EA 15 MIN 9/17/2019 Vicky Pickering, PT GP 2    19309889099 HC PT THER SUPP EA 15 MIN 9/17/2019 Vikcy Pickering, PT GP 1                    Vicky Pickering, PT  9/17/2019

## 2019-09-19 ENCOUNTER — HOSPITAL ENCOUNTER (OUTPATIENT)
Dept: PHYSICAL THERAPY | Facility: HOSPITAL | Age: 49
Setting detail: THERAPIES SERIES
Discharge: HOME | End: 2019-09-19

## 2019-09-19 DIAGNOSIS — M25.552 LEFT HIP PAIN: ICD-10-CM

## 2019-09-19 DIAGNOSIS — M54.5 LOW BACK PAIN, UNSPECIFIED BACK PAIN LATERALITY, UNSPECIFIED CHRONICITY, WITH SCIATICA PRESENCE UNSPECIFIED: Primary | ICD-10-CM

## 2019-09-19 PROCEDURE — 97140 MANUAL THERAPY 1/> REGIONS: CPT | Performed by: PHYSICAL THERAPIST

## 2019-09-19 NOTE — THERAPY TREATMENT NOTE
Outpatient Physical Therapy Ortho Treatment Note  [facility]     Patient Name: Vanessa Beach  : 1970  MRN: [id number]  Today's Date: 2019      Visit Date: 2019    Visit Dx:    ICD-10-CM ICD-9-CM   1. Low back pain, unspecified back pain laterality, unspecified chronicity, with sciatica presence unspecified M54.5 724.2   2. Left hip pain M25.552 719.45       Patient Active Problem List   Diagnosis   • Plantar fasciitis   • Acquired hypothyroidism   • Soft tissue mass        Past Medical History:   Diagnosis Date   • Abnormal mammography    • Acquired hypothyroidism    • Acute bronchitis    • Acute otitis externa    • Allergic reaction     Allergic reaction to substance - Poison IVY      • Alopecia    • Aphthous ulcer of mouth    • Contact dermatitis due to plants, except food    • Cough    • Fever    • GERD (gastroesophageal reflux disease)    • Herpes zoster    • PONV (postoperative nausea and vomiting)    • Presbyopia    • Upper respiratory infection    • Vitamin D deficiency    • Wheezing         Past Surgical History:   Procedure Laterality Date   • HYSTERECTOMY     • INJECTION OF MEDICATION  2014    KENALOG(2)   • LAPAROSCOPIC TUBAL LIGATION     • MASS EXCISION Right 2018    Procedure: EXCISE SOFT TISSUE MASS RIGHT BUTTOCK       (ANESTHESIA REQUEST YUEN);  Surgeon: Miguelito Galvan MD;  Location: [facility];  Service: General   • THYROIDECTOMY, PARTIAL  2000       PT Ortho     Row Name 19 9472       Subjective Comments    Subjective Comments  Reports feeling better since last session and able to wear heels without issues for the first time in a long time. Reports this afternoon was standing and waiting on a friend and noted a pop on left and had left hip pain after.   -BB       Precautions and Contraindications    Precautions/Limitations  no known precautions/limitations  -BB       Subjective Pain    Able to rate subjective pain?  yes  -BB     Pre-Treatment Pain Level  -- right low back and left hip  -BB    Post-Treatment Pain Level  0  -BB       Posture/Observations    Posture/Observations Comments  LL equal. Weakness noted of hip rotators   -BB    Row Name 09/17/19 2932       Subjective Comments    Subjective Comments  present with reports of left lateral hip pain and pain appears to radiate to lateral leg and notes low back feeling okay.   -BB       Precautions and Contraindications    Precautions/Limitations  no known precautions/limitations  -BB       Subjective Pain    Able to rate subjective pain?  yes  -BB    Pre-Treatment Pain Level  3  -BB    Post-Treatment Pain Level  3  -BB    Subjective Pain Comment  left hip pain  -BB       Posture/Observations    Posture/Observations Comments  Left up slip of SI noted. TrP and tone of hamstring   -BB       Special Tests/Palpation    Special Tests/Palpation  -- ttp of QL and ITB/HS laterally  -BB      User Key  (r) = Recorded By, (t) = Taken By, (c) = Cosigned By    Initials Name Provider Type    Vicky Gonzalez, PT Physical Therapist                      PT Assessment/Plan     Row Name 09/19/19 0742          PT Assessment    Assessment Comments  Patient appears to be responding well to dry needling. Patient continues to lack significant strength of the hips and has significant posterior pelvic tilt in standing. Patient could continue to benefit from core and hip strengthening   -BB        PT Plan    PT Frequency  1x/week;2x/week  -BB     Predicted Duration of Therapy Intervention (Therapy Eval)  4 weeks   -BB     PT Plan Comments  examination of feet for possible custom orthotics   -BB       User Key  (r) = Recorded By, (t) = Taken By, (c) = Cosigned By    Initials Name Provider Type    Vicky Gonzalez, PT Physical Therapist            OP Exercises     Row Name 09/19/19 8132             Subjective Comments    Subjective Comments  Reports feeling better since last session and able to wear heels without  "issues for the first time in a long time. Reports this afternoon was standing and waiting on a friend and noted a pop on left and had left hip pain after.   -BB         Subjective Pain    Able to rate subjective pain?  yes  -BB      Pre-Treatment Pain Level  -- right low back and left hip  -BB      Post-Treatment Pain Level  0  -BB         Exercise 1    Exercise Name 1  dry needling to ITB and lateral HS of left and right   -BB      Additional Comments  strong twitches noted in ITB   -BB         Exercise 2    Exercise Name 2  left long arm distraction and inferior hip mobe   -BB         Exercise 3    Exercise Name 3  bilateral hip ER/IR red tband   -BB      Sets 3  2  -BB      Reps 3  10  -BB         Exercise 4    Exercise Name 4  manual hip ER/IR stretch   -BB      Sets 4  3  -BB      Time 4  20\" each   -BB        User Key  (r) = Recorded By, (t) = Taken By, (c) = Cosigned By    Initials Name Provider Type    Vicky Gonzalez PT Physical Therapist                       PT OP Goals     Row Name 09/19/19 1305          PT Short Term Goals    STG Date to Achieve  10/03/19  -BB     STG 1  hip abduction of 4+/5   -BB     STG 1 Progress  Not Met  -BB     STG 2  hip flexion 5/5   -BB     STG 2 Progress  Not Met  -BB     STG 3  run 3-5 miles without increased pain per report   -BB     STG 3 Progress  Not Met  -BB     STG 4  Reports of no pain in AM.   -BB     STG 4 Progress  Not Met  -BB        Time Calculation    PT Goal Re-Cert Due Date  10/03/19  -BB       User Key  (r) = Recorded By, (t) = Taken By, (c) = Cosigned By    Initials Name Provider Type    Vicky Gonzalez PT Physical Therapist          Therapy Education  Education Details: Rest today and tomorrow with possible 1-2 mile run on saturday              Time Calculation:   Start Time: 1305  Stop Time: 1354  Time Calculation (min): 49 min  Therapy Charges for Today     Code Description Service Date Service Provider Modifiers Qty    27497556445  PT THER SUPP " EA 15 MIN 9/19/2019 Vicky Pickering, PT GP 1    [id number]  PT MANUAL THERAPY EA 15 MIN 9/19/2019 Vicky Pickering, PT  2                    Vicky Pickering, PT  9/19/2019

## 2019-09-24 ENCOUNTER — HOSPITAL ENCOUNTER (OUTPATIENT)
Dept: PHYSICAL THERAPY | Facility: HOSPITAL | Age: 49
Setting detail: THERAPIES SERIES
Discharge: HOME | End: 2019-09-24

## 2019-09-24 DIAGNOSIS — M54.5 LOW BACK PAIN, UNSPECIFIED BACK PAIN LATERALITY, UNSPECIFIED CHRONICITY, WITH SCIATICA PRESENCE UNSPECIFIED: Primary | ICD-10-CM

## 2019-09-24 DIAGNOSIS — M25.552 LEFT HIP PAIN: ICD-10-CM

## 2019-09-24 PROCEDURE — 97110 THERAPEUTIC EXERCISES: CPT | Performed by: PHYSICAL THERAPIST

## 2019-09-24 NOTE — THERAPY TREATMENT NOTE
"    Outpatient Physical Therapy Ortho Treatment Note  [facility]     Patient Name: Vanessa Beach  : 1970  MRN: [id number]  Today's Date: 2019      Visit Date: 2019    Visit Dx:    ICD-10-CM ICD-9-CM   1. Low back pain, unspecified back pain laterality, unspecified chronicity, with sciatica presence unspecified M54.5 724.2   2. Left hip pain M25.552 719.45       Patient Active Problem List   Diagnosis   • Plantar fasciitis   • Acquired hypothyroidism   • Soft tissue mass        Past Medical History:   Diagnosis Date   • Abnormal mammography    • Acquired hypothyroidism    • Acute bronchitis    • Acute otitis externa    • Allergic reaction     Allergic reaction to substance - Poison IVY      • Alopecia    • Aphthous ulcer of mouth    • Contact dermatitis due to plants, except food    • Cough    • Fever    • GERD (gastroesophageal reflux disease)    • Herpes zoster    • PONV (postoperative nausea and vomiting)    • Presbyopia    • Upper respiratory infection    • Vitamin D deficiency    • Wheezing         Past Surgical History:   Procedure Laterality Date   • HYSTERECTOMY     • INJECTION OF MEDICATION  2014    KENALOG(2)   • LAPAROSCOPIC TUBAL LIGATION     • MASS EXCISION Right 2018    Procedure: EXCISE SOFT TISSUE MASS RIGHT BUTTOCK       (ANESTHESIA REQUEST YUEN);  Surgeon: Miguelito Galvan MD;  Location: [facility];  Service: General   • THYROIDECTOMY, PARTIAL  2000       PT Ortho     Row Name 19 5812       Subjective Comments    Subjective Comments  Has been able to run without issues. Continues to do HEP for strengthening, Notes occasional \"normal\" low back pain with prolonged sitting   -BB       Precautions and Contraindications    Precautions/Limitations  no known precautions/limitations  -BB       Posture/Observations    Posture/Observations Comments  LL equal. bilateral hips grossly 4-/5  -BB      User Key  (r) = Recorded By, (t) = Taken By, (c) = Cosigned " "By    Initials Name Provider Type    Vicky Gonzalez, PT Physical Therapist                      PT Assessment/Plan     Row Name 09/24/19 1345          PT Assessment    Assessment Comments  Patient has returned to [facility] symptom free and educated on gentle progression with miles with running and to continue to progress hip/core strength   -BB     Patient/caregiver participated in establishment of treatment plan and goals  Yes  -BB     Patient would benefit from skilled therapy intervention  Yes  -BB        PT Plan    PT Plan Comments  To hold patient 1 week to progress HEP and strength, recheck scheduled with plan to do gait analysis  -BB       User Key  (r) = Recorded By, (t) = Taken By, (c) = Cosigned By    Initials Name Provider Type    Vicky Gonzalez, PT Physical Therapist            OP Exercises     Row Name 09/24/19 1343             Subjective Comments    Subjective Comments  Has been able to run without issues. Continues to do HEP for strengthening, Notes occasional \"normal\" low back pain with prolonged sitting   -BB         Subjective Pain    Pre-Treatment Pain Level  0  -BB      Post-Treatment Pain Level  0  -BB         Exercise 1    Exercise Name 1  Hip flex with tband with abduction   -BB      Sets 1  1  -BB      Reps 1  10  -BB      Additional Comments  bilateral   -BB         Exercise 2    Exercise Name 2  hip abduction with green tband   -BB      Sets 2  1  -BB      Reps 2  10  -BB         Exercise 3    Exercise Name 3  clamshells with green tband   -BB      Sets 3  1  -BB      Reps 3  10  -BB         Exercise 4    Exercise Name 4  bridge with LE kickouts   -BB      Sets 4  1  -BB      Reps 4  5  -BB         Exercise 5    Exercise Name 5  manual lumbar traction with belt   -BB      Time 5  10'  -BB         Exercise 6    Exercise Name 6  SL lumbar gapping of L4/5  -BB      Time 6  4'  -BB         Exercise 7    Exercise Name 7  POC discussed   -BB        User Key  (r) = Recorded By, (t) = Taken " By, (c) = Cosigned By    Initials Name Provider Type    Vicky Gonzalez PT Physical Therapist                       PT OP Goals     Row Name 09/24/19 1345          PT Short Term Goals    STG Date to Achieve  10/03/19  -BB     STG 1  hip abduction of 4+/5   -BB     STG 1 Progress  Not Met  -BB     STG 2  hip flexion 5/5   -BB     STG 2 Progress  Not Met  -BB     STG 3  run 3-5 miles without increased pain per report   -BB     STG 3 Progress  Not Met  -BB     STG 4  Reports of no pain in AM.   -BB     STG 4 Progress  Not Met  -BB        Time Calculation    PT Goal Re-Cert Due Date  10/03/19  -BB       User Key  (r) = Recorded By, (t) = Taken By, (c) = Cosigned By    Initials Name Provider Type    Vicky Gonzalez PT Physical Therapist          Therapy Education  Education Details: hip HEP with tband, monster walk, bridge with kickouts               Time Calculation:   Start Time: 1345  Stop Time: 1418  Time Calculation (min): 33 min  Therapy Charges for Today     Code Description Service Date Service Provider Modifiers Qty    49479966331 HC PT THER PROC EA 15 MIN 9/24/2019 Vicky Pickering, PT GP 2                    Vicky Pickering PT  9/24/2019

## 2019-09-26 ENCOUNTER — APPOINTMENT (OUTPATIENT)
Dept: PHYSICAL THERAPY | Facility: HOSPITAL | Age: 49
End: 2019-09-26

## 2019-10-07 ENCOUNTER — HOSPITAL ENCOUNTER (OUTPATIENT)
Dept: PHYSICAL THERAPY | Facility: HOSPITAL | Age: 49
Setting detail: THERAPIES SERIES
Discharge: HOME | End: 2019-10-07

## 2019-10-07 DIAGNOSIS — M25.552 LEFT HIP PAIN: Primary | ICD-10-CM

## 2019-10-07 PROCEDURE — 97140 MANUAL THERAPY 1/> REGIONS: CPT | Performed by: PHYSICAL THERAPIST

## 2019-10-07 PROCEDURE — 97110 THERAPEUTIC EXERCISES: CPT | Performed by: PHYSICAL THERAPIST

## 2019-10-07 NOTE — THERAPY PROGRESS REPORT/RE-CERT
Outpatient Physical Therapy Ortho Progress Note  [facility]     Patient Name: Vanessa Beach  : 1970  MRN: [id number]  Today's Date: 10/7/2019      Visit Date: 10/07/2019  Attendance:   Subjective % Improvement: 99.5%      Visit Dx:    ICD-10-CM ICD-9-CM   1. Left hip pain M25.552 719.45       Patient Active Problem List   Diagnosis   • Plantar fasciitis   • Acquired hypothyroidism   • Soft tissue mass        Past Medical History:   Diagnosis Date   • Abnormal mammography    • Acquired hypothyroidism    • Acute bronchitis    • Acute otitis externa    • Allergic reaction     Allergic reaction to substance - Poison IVY      • Alopecia    • Aphthous ulcer of mouth    • Contact dermatitis due to plants, except food    • Cough    • Fever    • GERD (gastroesophageal reflux disease)    • Herpes zoster    • PONV (postoperative nausea and vomiting)    • Presbyopia    • Upper respiratory infection    • Vitamin D deficiency    • Wheezing         Past Surgical History:   Procedure Laterality Date   • HYSTERECTOMY     • INJECTION OF MEDICATION  2014    KENALOG(2)   • LAPAROSCOPIC TUBAL LIGATION     • MASS EXCISION Right 2018    Procedure: EXCISE SOFT TISSUE MASS RIGHT BUTTOCK       (ANESTHESIA REQUEST YUEN);  Surgeon: Miguelito Galvan MD;  Location: [facility];  Service: General   • THYROIDECTOMY, PARTIAL  2000       PT Ortho     Row Name 10/07/19 9900       Subjective Comments    Subjective Comments  Reports doing better. Ran 5 miles this weekend and left hip feels better but is noted to have some low back pain   -BB       Precautions and Contraindications    Precautions/Limitations  no known precautions/limitations  -BB       Subjective Pain    Able to rate subjective pain?  yes  -BB    Pre-Treatment Pain Level  0  -BB    Post-Treatment Pain Level  0  -BB       Posture/Observations    Posture/Observations Comments  Left posterior rotation. Decreased lumbar mobility with segmental  mobilizations. Right hip adduction in running leading to increased left [place].   -BB       Lumbar/SI Special Tests    SLR (Neural Tension)  Negative  -BB       Lumbosacral Palpation    SI  Left:;Tender  -BB    Piriformis  -- no significant tenderness  -BB    Erector Spinae (Paraspinals)  -- no significant tenderness  -BB    Hamstring  Bilateral:;Guarded/taut mildly: -10 degrees approximately   -BB    Lumbosacral Palpation Comments  Decreased segmental mobility L3-S1.   -BB       General ROM    GENERAL ROM COMMENTS  Full AROM with no significant pain noted. Mild pinching with left sidebend   -BB       MMT (Manual Muscle Testing)    General MMT Comments  hip grossly 4+/5   -BB       Sensation    Sensation WNL?  WNL  -BB    Light Touch  No apparent deficits  -BB       Transfers    Comment (Transfers)  Independent in all   -BB       Gait/Stairs Assessment/Training    Comment (Gait/Stairs)  No antalgics. Posterior pelvic tilt seen. Right adducts in running but is improved after manual stretching   -BB      User Key  (r) = Recorded By, (t) = Taken By, (c) = Cosigned By    Initials Name Provider Type    BB Vicky Pickering PT Physical Therapist                      PT Assessment/Plan     Row Name 10/07/19 1566          PT Assessment    Functional Limitations  Impaired gait;Performance in leisure activities;Limitations in functional capacity and performance  -BB     Impairments  Gait;Pain;Muscle strength;Poor body mechanics  -BB     Assessment Comments  Patient presents today with continued functional hip weakness and increased joint tightness of right greater than left. Patient was noted to have a left SI posterior rotation today that improved with MET. Patients muscular tenderness is signfiicantly better at this point. Patient could benefit from continued progressive strengthening   -BB     Rehab Potential  Good  -BB     Patient/caregiver participated in establishment of treatment plan and goals  Yes  -BB     Patient  "would benefit from skilled therapy intervention  Yes  -BB        PT Plan    PT Frequency  1x/week  -BB     Predicted Duration of Therapy Intervention (Therapy Eval)  1-2 weeks   -BB     PT Plan Comments  hold one week with HEP, progress HEP next and look to DC   -BB       User Key  (r) = Recorded By, (t) = Taken By, (c) = Cosigned By    Initials Name Provider Type    Vicky Gonzalez, PT Physical Therapist            OP Exercises     Row Name 10/07/19 1303             Subjective Comments    Subjective Comments  Reports doing better. Ran 5 miles this weekend and left hip feels better but is noted to have some low back pain   -BB         Subjective Pain    Able to rate subjective pain?  yes  -BB      Pre-Treatment Pain Level  0  -BB      Post-Treatment Pain Level  0  -BB         Exercise 1    Exercise Name 1  running analysis   -BB      Time 1  2'  -BB         Exercise 2    Exercise Name 2  Left long arm distraction   -BB      Sets 2  1  -BB      Reps 2  10  -BB         Exercise 3    Exercise Name 3  manual right and left HS stretch, hip ER/IR stretch   -BB      Sets 3  3  -BB      Time 3  30\" each   -BB         Exercise 4    Exercise Name 4  SL L4-5, S1 gapping   -BB      Time 4  3'  -BB         Exercise 5    Exercise Name 5  MWM at sacral base for rotation to assist with SB  -BB      Sets 5  1  -BB      Reps 5  8  -BB         Exercise 6    Exercise Name 6  manual lumbar traction   -BB      Time 6  5'  -BB         Exercise 7    Exercise Name 7  gait analysis   -BB      Additional Comments  decreased right hip adduction noted   -BB        User Key  (r) = Recorded By, (t) = Taken By, (c) = Cosigned By    Initials Name Provider Type    Vicky Gonzalez, PT Physical Therapist                       PT OP Goals     Row Name 10/07/19 1300          PT Short Term Goals    STG Date to Achieve  10/28/19  -BB     STG 1  hip abduction of 4+/5   -BB     STG 1 Progress  Met  -BB     STG 2  hip flexion 5/5   -BB     STG 2 Progress "  Not Met  -BB     STG 2 Progress Comments  4+/5  -BB     STG 3  run 3-5 miles without increased pain per report   -BB     STG 3 Progress  Met  -BB     STG 4  Reports of no pain in AM.   -BB     STG 4 Progress  Met  -BB        Time Calculation    PT Goal Re-Cert Due Date  10/28/19  -BB       User Key  (r) = Recorded By, (t) = Taken By, (c) = Cosigned By    Initials Name Provider Type    Vicky Gonzalez PT Physical Therapist               Outcome Measure Options: Lower Extremity Functional Scale (LEFS)  Lower Extremity Functional Index  Any of your usual work, housework or school activities: No difficulty  Your usual hobbies, recreational or sporting activities: A little bit of difficulty  Getting into or out of the bath: No difficulty  Walking between rooms: No difficulty  Putting on your shoes or socks: No difficulty  Squatting: A little bit of difficulty  Lifting an object, like a bag of groceries from the floor: No difficulty  Performing light activities around your home: No difficulty  Performing heavy activities around your home: A little bit of difficulty  Getting into or out of a car: No difficulty  Walking 2 blocks: No difficulty  Walking a mile: No difficulty  Going up or down 10 stairs (about 1 flight of stairs): No difficulty  Standing for 1 hour: No difficulty  Sitting for 1 hour: No difficulty  Running on even ground: A little bit of difficulty  Running on uneven ground: A little bit of difficulty  Making sharp turns while running fast: A little bit of difficulty  Hopping: No difficulty  Rolling over in bed: No difficulty  Total: 74      Time Calculation:   Start Time: 1303  Stop Time: 1345  Time Calculation (min): 42 min  Therapy Charges for Today     Code Description Service Date Service Provider Modifiers Qty    63116072439 HC PT MANUAL THERAPY EA 15 MIN 10/7/2019 Vicky Pickering, PT GP 2    12275097642 HC PT THER PROC EA 15 MIN 10/7/2019 Vicky Pickering, PT GP 1          PT G-Codes  Outcome Measure  Options: Lower Extremity Functional Scale (LEFS)  Total: 74         Vicky Pickering, PT  10/7/2019

## 2019-10-22 ENCOUNTER — APPOINTMENT (OUTPATIENT)
Dept: PHYSICAL THERAPY | Facility: HOSPITAL | Age: 49
End: 2019-10-22

## 2019-10-28 ENCOUNTER — TELEPHONE (OUTPATIENT)
Dept: FAMILY MEDICINE CLINIC | Facility: CLINIC | Age: 49
End: 2019-10-28

## 2019-10-28 ENCOUNTER — APPOINTMENT (OUTPATIENT)
Dept: PHYSICAL THERAPY | Facility: HOSPITAL | Age: 49
End: 2019-10-28

## 2019-10-28 DIAGNOSIS — E03.9 ACQUIRED HYPOTHYROIDISM: Primary | Chronic | ICD-10-CM

## 2019-10-28 DIAGNOSIS — Z00.00 ANNUAL PHYSICAL EXAM: ICD-10-CM

## 2019-10-29 ENCOUNTER — LAB (OUTPATIENT)
Dept: LAB | Facility: HOSPITAL | Age: 49
End: 2019-10-29

## 2019-10-29 DIAGNOSIS — Z00.00 ANNUAL PHYSICAL EXAM: ICD-10-CM

## 2019-10-29 DIAGNOSIS — E03.9 ACQUIRED HYPOTHYROIDISM: Chronic | ICD-10-CM

## 2019-10-29 LAB
ALBUMIN SERPL-MCNC: 4.5 G/DL (ref 3.5–5.2)
ALBUMIN/GLOB SERPL: 1.7 G/DL
ALP SERPL-CCNC: 44 U/L (ref 39–117)
ALT SERPL W P-5'-P-CCNC: 14 U/L (ref 1–33)
ANION GAP SERPL CALCULATED.3IONS-SCNC: 10.6 MMOL/L (ref 5–15)
AST SERPL-CCNC: 18 U/L (ref 1–32)
BILIRUB SERPL-MCNC: 0.6 MG/DL (ref 0.2–1.2)
BUN BLD-MCNC: 8 MG/DL (ref 6–20)
BUN/CREAT SERPL: 11.6 (ref 7–25)
CALCIUM SPEC-SCNC: 9.1 MG/DL (ref 8.6–10.5)
CHLORIDE SERPL-SCNC: 103 MMOL/L (ref 98–107)
CHOLEST SERPL-MCNC: 215 MG/DL (ref 0–200)
CO2 SERPL-SCNC: 24.4 MMOL/L (ref 22–29)
CREAT BLD-MCNC: 0.69 MG/DL (ref 0.57–1)
GFR SERPL CREATININE-BSD FRML MDRD: 90 ML/MIN/1.73
GLOBULIN UR ELPH-MCNC: 2.6 GM/DL
GLUCOSE BLD-MCNC: 88 MG/DL (ref 65–99)
HDLC SERPL-MCNC: 93 MG/DL (ref 40–60)
LDLC SERPL CALC-MCNC: 113 MG/DL (ref 0–100)
LDLC/HDLC SERPL: 1.22 {RATIO}
POTASSIUM BLD-SCNC: 4 MMOL/L (ref 3.5–5.2)
PROT SERPL-MCNC: 7.1 G/DL (ref 6–8.5)
SODIUM BLD-SCNC: 138 MMOL/L (ref 136–145)
T4 FREE SERPL-MCNC: 1.3 NG/DL (ref 0.93–1.7)
TRIGL SERPL-MCNC: 44 MG/DL (ref 0–150)
TSH SERPL DL<=0.05 MIU/L-ACNC: 0.85 UIU/ML (ref 0.27–4.2)
VLDLC SERPL-MCNC: 8.8 MG/DL (ref 5–40)

## 2019-10-29 PROCEDURE — 84439 ASSAY OF FREE THYROXINE: CPT

## 2019-10-29 PROCEDURE — 80061 LIPID PANEL: CPT

## 2019-10-29 PROCEDURE — 84443 ASSAY THYROID STIM HORMONE: CPT

## 2019-10-29 PROCEDURE — 80053 COMPREHEN METABOLIC PANEL: CPT

## 2019-10-29 PROCEDURE — 36415 COLL VENOUS BLD VENIPUNCTURE: CPT

## 2019-10-30 ENCOUNTER — OFFICE VISIT (OUTPATIENT)
Dept: FAMILY MEDICINE CLINIC | Facility: CLINIC | Age: 49
End: 2019-10-30

## 2019-10-30 VITALS
DIASTOLIC BLOOD PRESSURE: 70 MMHG | BODY MASS INDEX: 21.71 KG/M2 | HEIGHT: 66 IN | WEIGHT: 135.1 LBS | SYSTOLIC BLOOD PRESSURE: 125 MMHG | OXYGEN SATURATION: 98 % | HEART RATE: 61 BPM

## 2019-10-30 DIAGNOSIS — Z12.39 SCREENING FOR BREAST CANCER: Primary | ICD-10-CM

## 2019-10-30 DIAGNOSIS — E03.9 ACQUIRED HYPOTHYROIDISM: ICD-10-CM

## 2019-10-30 DIAGNOSIS — Z00.00 ANNUAL PHYSICAL EXAM: Primary | ICD-10-CM

## 2019-10-30 DIAGNOSIS — Z23 NEED FOR VACCINATION: ICD-10-CM

## 2019-10-30 PROCEDURE — 99396 PREV VISIT EST AGE 40-64: CPT | Performed by: GENERAL PRACTICE

## 2019-10-30 PROCEDURE — 90715 TDAP VACCINE 7 YRS/> IM: CPT | Performed by: GENERAL PRACTICE

## 2019-10-30 PROCEDURE — 90471 IMMUNIZATION ADMIN: CPT | Performed by: GENERAL PRACTICE

## 2019-10-30 RX ORDER — LEVOTHYROXINE SODIUM 88 UG/1
88 TABLET ORAL DAILY
Qty: 90 TABLET | Refills: 3 | Status: SHIPPED | OUTPATIENT
Start: 2019-10-30 | End: 2020-11-16

## 2019-10-30 NOTE — PROGRESS NOTES
Subjective   Vanessa Beach is a 49 y.o. female.     Chief Complaint   Patient presents with   • Annual Exam   • Hypothyroidism       History of Present Illness     For annual wellness exam.  Labs reviewed. Due for mammogram.      The following portions of the patient's history were reviewed and updated as appropriate: allergies, current medications, past family and social history and problem list.    Outpatient Medications Prior to Visit   Medication Sig Dispense Refill   • ACETAMINOPHEN PO Take  by mouth.     • Cholecalciferol 1000 UNITS tablet Take 1,000 Units by mouth daily.     • magnesium oxide (MAG-OX) 400 MG tablet Take 400 mg by mouth Daily.     • omeprazole (priLOSEC) 20 MG capsule Take 20 mg by mouth Daily.     • levothyroxine (SYNTHROID, LEVOTHROID) 88 MCG tablet Take 1 tablet by mouth Daily. 90 tablet 3     No facility-administered medications prior to visit.        Review of Systems   Constitutional: Negative.  Negative for chills, fatigue, fever and unexpected weight change.   HENT: Negative.  Negative for congestion, ear pain, hearing loss, nosebleeds, rhinorrhea, sneezing, sore throat and tinnitus.    Eyes: Negative.  Negative for discharge.   Respiratory: Negative.  Negative for cough, shortness of breath and wheezing.    Cardiovascular: Negative.  Negative for chest pain and palpitations.   Gastrointestinal: Negative.  Negative for abdominal pain, constipation, diarrhea, nausea and vomiting.   Endocrine: Negative.    Genitourinary: Negative.  Negative for dysuria, frequency and urgency.   Musculoskeletal: Negative.  Negative for arthralgias, back pain, joint swelling, myalgias and neck pain.   Skin: Negative.  Negative for rash.   Allergic/Immunologic: Negative.    Neurological: Negative.  Negative for dizziness, weakness, numbness and headaches.   Hematological: Negative.  Negative for adenopathy.   Psychiatric/Behavioral: Negative.  Negative for dysphoric mood and sleep disturbance. The  "patient is not nervous/anxious.        Objective     Visit Vitals  /70 (BP Location: Left arm)   Pulse 61   Ht 166.4 cm (65.5\")   Wt 61.3 kg (135 lb 1.6 oz)   SpO2 98%   BMI 22.14 kg/m²     Physical Exam   Constitutional: She is oriented to person, place, and time. She appears well-developed and well-nourished. No distress.   HENT:   Head: Normocephalic and atraumatic.   Nose: Nose normal.   Mouth/Throat: Oropharynx is clear and moist.   Eyes: Conjunctivae and EOM are normal. Pupils are equal, round, and reactive to light. Right eye exhibits no discharge. Left eye exhibits no discharge.   Neck: No tracheal deviation present. No thyromegaly present.   Cardiovascular: Normal rate, regular rhythm, normal heart sounds and intact distal pulses.   No murmur heard.  Pulmonary/Chest: Effort normal and breath sounds normal. No respiratory distress. She has no wheezes. She has no rales. She exhibits no tenderness. Right breast exhibits no inverted nipple, no mass, no nipple discharge, no skin change and no tenderness. Left breast exhibits no inverted nipple, no mass, no nipple discharge, no skin change and no tenderness.   Abdominal: Soft. Bowel sounds are normal. She exhibits no distension and no mass. There is no tenderness. No hernia.   Musculoskeletal: Normal range of motion. She exhibits no deformity.   Lymphadenopathy:     She has no cervical adenopathy.   Neurological: She is alert and oriented to person, place, and time. She has normal reflexes.   Skin: Skin is warm and dry.   Psychiatric: She has a normal mood and affect. Her behavior is normal. Judgment and thought content normal.   Nursing note and vitals reviewed.    Results for orders placed or performed in visit on 10/29/19   Comprehensive Metabolic Panel   Result Value Ref Range    Glucose 88 65 - 99 mg/dL    BUN 8 6 - 20 mg/dL    Creatinine 0.69 0.57 - 1.00 mg/dL    Sodium 138 136 - 145 mmol/L    Potassium 4.0 3.5 - 5.2 mmol/L    Chloride 103 98 - 107 " mmol/L    CO2 24.4 22.0 - 29.0 mmol/L    Calcium 9.1 8.6 - 10.5 mg/dL    Total Protein 7.1 6.0 - 8.5 g/dL    Albumin 4.50 3.50 - 5.20 g/dL    ALT (SGPT) 14 1 - 33 U/L    AST (SGOT) 18 1 - 32 U/L    Alkaline Phosphatase 44 39 - 117 U/L    Total Bilirubin 0.6 0.2 - 1.2 mg/dL    eGFR Non African Amer 90 >60 mL/min/1.73    Globulin 2.6 gm/dL    A/G Ratio 1.7 g/dL    BUN/Creatinine Ratio 11.6 7.0 - 25.0    Anion Gap 10.6 5.0 - 15.0 mmol/L   TSH   Result Value Ref Range    TSH 0.849 0.270 - 4.200 uIU/mL   T4, Free   Result Value Ref Range    Free T4 1.30 0.93 - 1.70 ng/dL   Lipid Panel   Result Value Ref Range    Total Cholesterol 215 (H) 0 - 200 mg/dL    Triglycerides 44 0 - 150 mg/dL    HDL Cholesterol 93 (H) 40 - 60 mg/dL    LDL Cholesterol  113 (H) 0 - 100 mg/dL    VLDL Cholesterol 8.8 5 - 40 mg/dL    LDL/HDL Ratio 1.22       Assessment/Plan   Problem List Items Addressed This Visit        Endocrine    Acquired hypothyroidism (Chronic)    Relevant Medications    levothyroxine (SYNTHROID, LEVOTHROID) 88 MCG tablet    Other Relevant Orders    TSH    T4, Free      Other Visit Diagnoses     Annual physical exam    -  Primary    Relevant Orders    Comprehensive Metabolic Panel    Lipid Panel    TSH    T4, Free    Urinalysis With Microscopic - Urine, Clean Catch          Will notify regarding results. Continue current treatment. Age-appropriate counseling is provided.      New Medications Ordered This Visit   Medications   • levothyroxine (SYNTHROID, LEVOTHROID) 88 MCG tablet     Sig: Take 1 tablet by mouth Daily.     Dispense:  90 tablet     Refill:  3     Return in about 1 year (around 10/30/2020) for Annual physical.

## 2019-11-13 DIAGNOSIS — Z12.31 ENCOUNTER FOR SCREENING MAMMOGRAM FOR MALIGNANT NEOPLASM OF BREAST: Primary | ICD-10-CM

## 2020-10-29 ENCOUNTER — TELEPHONE (OUTPATIENT)
Dept: FAMILY MEDICINE CLINIC | Facility: CLINIC | Age: 50
End: 2020-10-29

## 2020-10-29 RX ORDER — FAMCICLOVIR 500 MG/1
500 TABLET ORAL 3 TIMES DAILY
Qty: 21 TABLET | Refills: 0 | Status: ON HOLD | OUTPATIENT
Start: 2020-10-29 | End: 2021-07-28

## 2020-10-29 NOTE — TELEPHONE ENCOUNTER
PATIENT CALLED IN STATING THAT SHE IS CURRENTLY OUT OF TOWN IN [place]. BUT BEFORE PATIENT LEFT SHE HAD PAIN IN HER LEFT BREAST AND IT WAS A LITTLE ITCHY.     PATIENT WOKE UP THIS MORNING AND SHE HAS A FEW SPOTS ON HER BACK AND SHE THINK IT SHINGLES BECAUSE SHE HAS HAD THEM BEFORE.     PATIENT WANTS TO KNOW CAN YOU PRESCRIBE HER SOME MEDICATIONS WHERE SHE IS IN [place] BECAUSE SHE WON'T BE BACK UNTIL NEXT WEEK BUT SHE DOESN'T WAN IT TO SPREAD WORSE.

## 2020-11-02 ENCOUNTER — LAB (OUTPATIENT)
Dept: LAB | Facility: HOSPITAL | Age: 50
End: 2020-11-02

## 2020-11-02 DIAGNOSIS — Z00.00 ANNUAL PHYSICAL EXAM: Primary | ICD-10-CM

## 2020-11-02 DIAGNOSIS — E03.9 ACQUIRED HYPOTHYROIDISM: ICD-10-CM

## 2020-11-02 LAB
ALBUMIN SERPL-MCNC: 4.9 G/DL (ref 3.5–5.2)
ALBUMIN/GLOB SERPL: 2.3 G/DL
ALP SERPL-CCNC: 53 U/L (ref 39–117)
ALT SERPL W P-5'-P-CCNC: 14 U/L (ref 1–33)
ANION GAP SERPL CALCULATED.3IONS-SCNC: 13.1 MMOL/L (ref 5–15)
AST SERPL-CCNC: 18 U/L (ref 1–32)
BILIRUB SERPL-MCNC: 0.6 MG/DL (ref 0–1.2)
BILIRUB UR QL STRIP: NEGATIVE
BUN SERPL-MCNC: 13 MG/DL (ref 6–20)
BUN/CREAT SERPL: 19.7 (ref 7–25)
CALCIUM SPEC-SCNC: 9.2 MG/DL (ref 8.6–10.5)
CHLORIDE SERPL-SCNC: 103 MMOL/L (ref 98–107)
CHOLEST SERPL-MCNC: 227 MG/DL (ref 0–200)
CLARITY UR: CLEAR
CO2 SERPL-SCNC: 23.9 MMOL/L (ref 22–29)
COLOR UR: YELLOW
CREAT SERPL-MCNC: 0.66 MG/DL (ref 0.57–1)
GFR SERPL CREATININE-BSD FRML MDRD: 95 ML/MIN/1.73
GLOBULIN UR ELPH-MCNC: 2.1 GM/DL
GLUCOSE SERPL-MCNC: 85 MG/DL (ref 65–99)
GLUCOSE UR STRIP-MCNC: NEGATIVE MG/DL
HDLC SERPL-MCNC: 98 MG/DL (ref 40–60)
HGB UR QL STRIP.AUTO: NEGATIVE
KETONES UR QL STRIP: NEGATIVE
LDLC SERPL CALC-MCNC: 122 MG/DL (ref 0–100)
LDLC/HDLC SERPL: 1.23 {RATIO}
LEUKOCYTE ESTERASE UR QL STRIP.AUTO: NEGATIVE
NITRITE UR QL STRIP: NEGATIVE
PH UR STRIP.AUTO: 6 [PH] (ref 5–8)
POTASSIUM SERPL-SCNC: 4.5 MMOL/L (ref 3.5–5.2)
PROT SERPL-MCNC: 7 G/DL (ref 6–8.5)
PROT UR QL STRIP: NEGATIVE
SODIUM SERPL-SCNC: 140 MMOL/L (ref 136–145)
SP GR UR STRIP: 1.01 (ref 1–1.03)
T4 SERPL-MCNC: 6.2 MCG/DL (ref 4.5–11.7)
TRIGL SERPL-MCNC: 41 MG/DL (ref 0–150)
TSH SERPL DL<=0.05 MIU/L-ACNC: 0.84 UIU/ML (ref 0.27–4.2)
UROBILINOGEN UR QL STRIP: NORMAL
VLDLC SERPL-MCNC: 7 MG/DL (ref 5–40)

## 2020-11-02 PROCEDURE — 80061 LIPID PANEL: CPT

## 2020-11-02 PROCEDURE — 36415 COLL VENOUS BLD VENIPUNCTURE: CPT

## 2020-11-02 PROCEDURE — 83735 ASSAY OF MAGNESIUM: CPT | Performed by: GENERAL PRACTICE

## 2020-11-02 PROCEDURE — 84443 ASSAY THYROID STIM HORMONE: CPT

## 2020-11-02 PROCEDURE — 81003 URINALYSIS AUTO W/O SCOPE: CPT

## 2020-11-02 PROCEDURE — 80053 COMPREHEN METABOLIC PANEL: CPT

## 2020-11-02 PROCEDURE — 84436 ASSAY OF TOTAL THYROXINE: CPT

## 2020-11-03 ENCOUNTER — OFFICE VISIT (OUTPATIENT)
Dept: FAMILY MEDICINE CLINIC | Facility: CLINIC | Age: 50
End: 2020-11-03

## 2020-11-03 VITALS
HEIGHT: 65 IN | BODY MASS INDEX: 23.24 KG/M2 | DIASTOLIC BLOOD PRESSURE: 70 MMHG | SYSTOLIC BLOOD PRESSURE: 110 MMHG | OXYGEN SATURATION: 98 % | HEART RATE: 63 BPM | WEIGHT: 139.5 LBS

## 2020-11-03 DIAGNOSIS — E55.9 VITAMIN D DEFICIENCY: ICD-10-CM

## 2020-11-03 DIAGNOSIS — Z11.59 NEED FOR HEPATITIS C SCREENING TEST: ICD-10-CM

## 2020-11-03 DIAGNOSIS — Z23 NEED FOR INFLUENZA VACCINATION: ICD-10-CM

## 2020-11-03 DIAGNOSIS — E03.9 ACQUIRED HYPOTHYROIDISM: Chronic | ICD-10-CM

## 2020-11-03 DIAGNOSIS — N64.4 BREAST PAIN: ICD-10-CM

## 2020-11-03 DIAGNOSIS — Z00.00 ANNUAL PHYSICAL EXAM: Primary | ICD-10-CM

## 2020-11-03 DIAGNOSIS — Z12.11 SCREENING FOR COLON CANCER: ICD-10-CM

## 2020-11-03 LAB — MAGNESIUM SERPL-MCNC: 2.3 MG/DL (ref 1.6–2.6)

## 2020-11-03 PROCEDURE — 90686 IIV4 VACC NO PRSV 0.5 ML IM: CPT | Performed by: GENERAL PRACTICE

## 2020-11-03 PROCEDURE — 99396 PREV VISIT EST AGE 40-64: CPT | Performed by: GENERAL PRACTICE

## 2020-11-03 PROCEDURE — 90471 IMMUNIZATION ADMIN: CPT | Performed by: GENERAL PRACTICE

## 2020-11-03 NOTE — PROGRESS NOTES
Subjective   Vanessa Beach is a 50 y.o. female.     Chief Complaint   Patient presents with   • Annual Exam   • Hypothyroidism       History of Present Illness     For annual wellness exam.  Labs reviewed. Due for mammogram.  Recent left breast and back pain, developed rash, has had shingles there before. On Famvir.     The following portions of the patient's history were reviewed and updated as appropriate: allergies, current medications, past family and social history and problem list.    Outpatient Medications Prior to Visit   Medication Sig Dispense Refill   • ACETAMINOPHEN PO Take  by mouth.     • famciclovir (Famvir) 500 MG tablet Take 1 tablet by mouth 3 (Three) Times a Day. 21 tablet 0   • levothyroxine (SYNTHROID, LEVOTHROID) 88 MCG tablet Take 1 tablet by mouth Daily. 90 tablet 3   • magnesium oxide (MAG-OX) 400 MG tablet Take 400 mg by mouth Daily.     • omeprazole (priLOSEC) 20 MG capsule Take 20 mg by mouth Daily. prn     • Cholecalciferol 1000 UNITS tablet Take 1,000 Units by mouth daily.       No facility-administered medications prior to visit.        Review of Systems   Constitutional: Negative.  Negative for chills, fatigue, fever and unexpected weight change.   HENT: Negative.  Negative for congestion, ear pain, hearing loss, nosebleeds, rhinorrhea, sneezing, sore throat and tinnitus.    Eyes: Negative.  Negative for discharge.   Respiratory: Negative.  Negative for cough, shortness of breath and wheezing.    Cardiovascular: Negative.  Negative for chest pain and palpitations.   Gastrointestinal: Negative.  Negative for abdominal pain, constipation, diarrhea, nausea and vomiting.   Endocrine: Negative.    Genitourinary: Negative.  Negative for dysuria, frequency and urgency.   Musculoskeletal: Negative.  Negative for arthralgias, back pain, joint swelling, myalgias and neck pain.   Skin: Positive for rash.   Allergic/Immunologic: Negative.    Neurological: Negative.  Negative for dizziness,  "weakness, numbness and headaches.   Hematological: Negative.  Negative for adenopathy.   Psychiatric/Behavioral: Negative.  Negative for dysphoric mood and sleep disturbance. The patient is not nervous/anxious.        Objective     Visit Vitals  /70 (BP Location: Left arm)   Pulse 63   Ht 165.1 cm (65\")   Wt 63.3 kg (139 lb 8 oz)   LMP  (LMP Unknown)   SpO2 98%   BMI 23.21 kg/m²     Physical Exam  Vitals signs and nursing note reviewed.   Constitutional:       General: She is not in acute distress.     Appearance: She is well-developed.   HENT:      Head: Normocephalic and atraumatic.      Nose: Nose normal.   Eyes:      General:         Right eye: No discharge.         Left eye: No discharge.      Conjunctiva/sclera: Conjunctivae normal.      Pupils: Pupils are equal, round, and reactive to light.   Neck:      Thyroid: No thyromegaly.      Trachea: No tracheal deviation.   Cardiovascular:      Rate and Rhythm: Normal rate and regular rhythm.      Heart sounds: Normal heart sounds. No murmur.   Pulmonary:      Effort: Pulmonary effort is normal. No respiratory distress.      Breath sounds: Normal breath sounds. No wheezing or rales.   Chest:      Chest wall: No tenderness.      Breasts:         Right: No inverted nipple, mass, nipple discharge, skin change or tenderness.         Left: No inverted nipple, mass, nipple discharge, skin change or tenderness.   Abdominal:      General: Bowel sounds are normal. There is no distension.      Palpations: Abdomen is soft. There is no mass.      Tenderness: There is no abdominal tenderness.      Hernia: No hernia is present.   Musculoskeletal: Normal range of motion.         General: No deformity.   Lymphadenopathy:      Cervical: No cervical adenopathy.   Skin:     General: Skin is warm and dry.          Neurological:      Mental Status: She is alert and oriented to person, place, and time.      Deep Tendon Reflexes: Reflexes are normal and symmetric.   Psychiatric:    "      Behavior: Behavior normal.         Thought Content: Thought content normal.         Judgment: Judgment normal.       Results for orders placed or performed in visit on 11/03/20   Magnesium    Specimen: Blood   Result Value Ref Range    Magnesium 2.3 1.6 - 2.6 mg/dL      Notes brought forward are reviewed and updated if indicated.     Assessment/Plan   Problems Addressed this Visit        Endocrine    Acquired hypothyroidism (Chronic)    Relevant Orders    TSH    T4, Free      Other Visit Diagnoses     Annual physical exam    -  Primary    Relevant Orders    Comprehensive Metabolic Panel    Vitamin D 25 Hydroxy    Urinalysis With Culture If Indicated -    Lipid Panel    TSH    T4, Free    Breast pain        Relevant Orders    Magnesium (Completed)    Need for hepatitis C screening test        Screening for colon cancer        Relevant Orders    Ambulatory Referral to Gastroenterology (Completed)    Need for influenza vaccination        Vitamin D deficiency        Relevant Orders    Vitamin D 25 Hydroxy      Diagnoses       Codes Comments    Annual physical exam    -  Primary ICD-10-CM: Z00.00  ICD-9-CM: V70.0     Breast pain     ICD-10-CM: N64.4  ICD-9-CM: 611.71     Need for hepatitis C screening test     ICD-10-CM: Z11.59  ICD-9-CM: V73.89     Screening for colon cancer     ICD-10-CM: Z12.11  ICD-9-CM: V76.51     Need for influenza vaccination     ICD-10-CM: Z23  ICD-9-CM: V04.81     Acquired hypothyroidism     ICD-10-CM: E03.9  ICD-9-CM: 244.9     Vitamin D deficiency     ICD-10-CM: E55.9  ICD-9-CM: 268.9          Will notify regarding results. Continue current treatment. Recheck if not improving.  Age-appropriate counseling is provided.      No orders of the defined types were placed in this encounter.    Return in about 1 year (around 11/15/2021) for Annual physical.

## 2020-11-16 RX ORDER — LEVOTHYROXINE SODIUM 88 UG/1
TABLET ORAL
Qty: 90 TABLET | Refills: 3 | Status: SHIPPED | OUTPATIENT
Start: 2020-11-16 | End: 2021-11-05

## 2020-11-30 DIAGNOSIS — Z12.31 ENCOUNTER FOR SCREENING MAMMOGRAM FOR MALIGNANT NEOPLASM OF BREAST: Primary | ICD-10-CM

## 2020-12-18 ENCOUNTER — IMMUNIZATION (OUTPATIENT)
Dept: VACCINE CLINIC | Facility: HOSPITAL | Age: 50
End: 2020-12-18

## 2020-12-18 PROCEDURE — 91300 HC SARSCOV02 VAC 30MCG/0.3ML IM: CPT | Performed by: THORACIC SURGERY (CARDIOTHORACIC VASCULAR SURGERY)

## 2020-12-18 PROCEDURE — 0001A: CPT | Performed by: THORACIC SURGERY (CARDIOTHORACIC VASCULAR SURGERY)

## 2021-01-08 ENCOUNTER — IMMUNIZATION (OUTPATIENT)
Dept: VACCINE CLINIC | Facility: HOSPITAL | Age: 51
End: 2021-01-08

## 2021-01-08 PROCEDURE — 0002A: CPT | Performed by: THORACIC SURGERY (CARDIOTHORACIC VASCULAR SURGERY)

## 2021-01-08 PROCEDURE — 0001A: CPT | Performed by: THORACIC SURGERY (CARDIOTHORACIC VASCULAR SURGERY)

## 2021-01-08 PROCEDURE — 91300 HC SARSCOV02 VAC 30MCG/0.3ML IM: CPT | Performed by: THORACIC SURGERY (CARDIOTHORACIC VASCULAR SURGERY)

## 2021-03-09 ENCOUNTER — LAB (OUTPATIENT)
Dept: LAB | Facility: HOSPITAL | Age: 51
End: 2021-03-09

## 2021-03-09 DIAGNOSIS — Z01.818 PREOP TESTING: Primary | ICD-10-CM

## 2021-03-09 LAB — SARS-COV-2 ORF1AB RESP QL NAA+PROBE: NOT DETECTED

## 2021-03-09 PROCEDURE — U0004 COV-19 TEST NON-CDC HGH THRU: HCPCS

## 2021-03-09 PROCEDURE — C9803 HOPD COVID-19 SPEC COLLECT: HCPCS

## 2021-03-12 ENCOUNTER — ANESTHESIA EVENT (OUTPATIENT)
Dept: GASTROENTEROLOGY | Facility: HOSPITAL | Age: 51
End: 2021-03-12

## 2021-03-12 ENCOUNTER — HOSPITAL ENCOUNTER (OUTPATIENT)
Facility: HOSPITAL | Age: 51
Setting detail: HOSPITAL OUTPATIENT SURGERY
Discharge: HOME | End: 2021-03-12
Attending: INTERNAL MEDICINE | Admitting: INTERNAL MEDICINE

## 2021-03-12 ENCOUNTER — ANESTHESIA (OUTPATIENT)
Dept: GASTROENTEROLOGY | Facility: HOSPITAL | Age: 51
End: 2021-03-12

## 2021-03-12 VITALS
HEART RATE: 76 BPM | RESPIRATION RATE: 18 BRPM | OXYGEN SATURATION: 97 % | DIASTOLIC BLOOD PRESSURE: 61 MMHG | TEMPERATURE: 97.4 F | HEIGHT: 65 IN | WEIGHT: 135 LBS | SYSTOLIC BLOOD PRESSURE: 101 MMHG | BODY MASS INDEX: 22.49 KG/M2

## 2021-03-12 PROCEDURE — 25010000002 PROPOFOL 10 MG/ML EMULSION: Performed by: NURSE ANESTHETIST, CERTIFIED REGISTERED

## 2021-03-12 RX ORDER — LIDOCAINE HYDROCHLORIDE 20 MG/ML
INJECTION, SOLUTION EPIDURAL; INFILTRATION; INTRACAUDAL; PERINEURAL AS NEEDED
Status: DISCONTINUED | OUTPATIENT
Start: 2021-03-12 | End: 2021-03-12 | Stop reason: SURG

## 2021-03-12 RX ORDER — DEXTROSE AND SODIUM CHLORIDE 5; .45 G/100ML; G/100ML
30 INJECTION, SOLUTION INTRAVENOUS CONTINUOUS PRN
Status: DISCONTINUED | OUTPATIENT
Start: 2021-03-12 | End: 2021-03-12 | Stop reason: HOSPADM

## 2021-03-12 RX ORDER — PROPOFOL 10 MG/ML
VIAL (ML) INTRAVENOUS AS NEEDED
Status: DISCONTINUED | OUTPATIENT
Start: 2021-03-12 | End: 2021-03-12 | Stop reason: SURG

## 2021-03-12 RX ADMIN — PROPOFOL 30 MG: 10 INJECTION, EMULSION INTRAVENOUS at 14:10

## 2021-03-12 RX ADMIN — PROPOFOL 20 MG: 10 INJECTION, EMULSION INTRAVENOUS at 14:16

## 2021-03-12 RX ADMIN — PROPOFOL 40 MG: 10 INJECTION, EMULSION INTRAVENOUS at 14:14

## 2021-03-12 RX ADMIN — DEXTROSE AND SODIUM CHLORIDE 30 ML/HR: 5; 450 INJECTION, SOLUTION INTRAVENOUS at 13:38

## 2021-03-12 RX ADMIN — LIDOCAINE HYDROCHLORIDE 60 MG: 20 INJECTION, SOLUTION EPIDURAL; INFILTRATION; INTRACAUDAL; PERINEURAL at 14:06

## 2021-03-12 RX ADMIN — PROPOFOL 70 MG: 10 INJECTION, EMULSION INTRAVENOUS at 14:06

## 2021-03-12 RX ADMIN — PROPOFOL 30 MG: 10 INJECTION, EMULSION INTRAVENOUS at 14:17

## 2021-03-12 RX ADMIN — PROPOFOL 30 MG: 10 INJECTION, EMULSION INTRAVENOUS at 14:08

## 2021-03-12 RX ADMIN — PROPOFOL 30 MG: 10 INJECTION, EMULSION INTRAVENOUS at 14:11

## 2021-03-12 NOTE — H&P
Edwige Baker DO,[facility]  Gastroenterology  Hepatology  Endoscopy  Board Certified in Internal Medicine and gastroenterology  [address], [address]  [place]. [address]  - [phone]   F - [phone]     GASTROENTEROLOGY HISTORY AND PHYSICAL  NOTE   EDWIGE BAKER DO.         SUBJECTIVE:   3/12/2021    Name: Vanessa Beach  DOD: 1970        Chief Complaint:       Subjective : Screen for colon cancer    Patient is 50 y.o. female presents with desire for elective colonoscopy.      ROS/HISTORY/ CURRENT MEDICATIONS/OBJECTIVE/VS/PE:   Review of Systems:  All systems unremarkable unless specified below.  Constitutional   HENT  Eyes   Respiratory    Cardiovascular  Gastrointestinal   Endocrine  Genitourinary    Musculoskeletal   Skin  Allergic/Immunologic    Neurological    Hematological  Psychiatric/Behavioral    History:     Past Medical History:   Diagnosis Date    Abnormal mammography     Acquired hypothyroidism     Acute bronchitis     Acute otitis externa     Allergic reaction     Allergic reaction to substance - Poison IVY       Alopecia     Aphthous ulcer of mouth     Contact dermatitis due to plants, except food     Cough     Fever     GERD (gastroesophageal reflux disease)     Herpes zoster     PONV (postoperative nausea and vomiting)     Presbyopia     Upper respiratory infection     Vitamin D deficiency     Wheezing      Past Surgical History:   Procedure Laterality Date    HYSTERECTOMY      INJECTION OF MEDICATION  12/08/2014    KENALOG(2)    LAPAROSCOPIC TUBAL LIGATION      MASS EXCISION Right 5/8/2018    Procedure: EXCISE SOFT TISSUE MASS RIGHT BUTTOCK       (ANESTHESIA REQUEST YUEN);  Surgeon: Miguelito Galvan MD;  Location: [facility];  Service: General    THYROIDECTOMY, PARTIAL  02/07/2000     Family History   Problem Relation Age of Onset    Uterine cancer Mother     Coronary artery disease Mother     Hypertension Mother     Cancer Mother     Diabetes Mother      "Bleeding Disorder Mother     Depression Father      Social History     Tobacco Use    Smoking status: Never Smoker    Smokeless tobacco: Never Used   Vaping Use    Vaping Use: Never used   Substance Use Topics    Alcohol use: Yes     Comment: occasional    Drug use: No     Prior to Admission medications    Medication Sig Start Date End Date Taking? Authorizing Provider   ACETAMINOPHEN PO Take 500 mg by mouth Every 4 (Four) Hours As Needed.    Jordi Rooney MD   Cholecalciferol 1000 UNITS tablet Take 1,000 Units by mouth daily.    Jordi Rooney MD   famciclovir (Famvir) 500 MG tablet Take 1 tablet by mouth 3 (Three) Times a Day. 10/29/20   Meghana Connolly MD   levothyroxine (SYNTHROID, LEVOTHROID) 88 MCG tablet TAKE ONE TABLET BY MOUTH DAILY 11/16/20   Meghana Connolly MD   magnesium oxide (MAG-OX) 400 MG tablet Take 400 mg by mouth Daily.    Jordi Rooney MD   omeprazole (priLOSEC) 20 MG capsule Take 20 mg by mouth Daily. prn    Jordi Rooney MD     Allergies:  Minocin [minocycline hcl]    I have reviewed the patients medical history, surgical history and family history in the available medical record system.     Current Medications:     Current Facility-Administered Medications   Medication Dose Route Frequency Provider Last Rate Last Admin    dextrose 5 % and sodium chloride 0.45 % infusion  30 mL/hr Intravenous Continuous PRN Ryan Baker DO           Objective     Physical Exam:      /61 (Patient Position: Lying)   Pulse 76   Temp 97.4 °F (36.3 °C) (Temporal)   Resp 18   Ht 165.1 cm (65\")   Wt 61.2 kg (135 lb)   LMP  (LMP Unknown)   SpO2 97%   BMI 22.47 kg/m²    Physical Exam:  General Appearance:    Alert, cooperative, in no acute distress   Head:    Normocephalic, without obvious abnormality, atraumatic   Eyes:            Lids and lashes normal, conjunctivae and sclerae normal, no icterus, no pallor, corneas clear, PERRLA   Ears:    Ears appear intact " with no abnormalities noted   Throat:   No oral lesions, no thrush, oral mucosa moist   Neck:   No adenopathy, supple, trachea midline, no thyromegaly, no  carotid bruit, no JVD   Back:     No kyphosis present, no scoliosis present, no skin lesions,   erythema or scars, no tenderness to percussion or                 palpation,  range of motion normal   Lungs:     Clear to auscultation,respirations regular, even and         unlabored    Heart:    Regular rhythm and normal rate, normal S1 and S2, no  murmur, no gallop, no rub, no click   Breast Exam:    Deferred   Abdomen:     Normal bowel sounds, no masses, no organomegaly, soft  nontender, nondistended, no guarding, no rebound                 tenderness   Genitalia:    Deferred   Extremities:   Moves all extremities well, no edema, no cyanosis, no          redness   Pulses:   Pulses palpable and equal bilaterally   Skin:   No bleeding, bruising or rash   Lymph nodes:   No palpable adenopathy   Neurologic:   Cranial nerves 2 - 12 grossly intact, sensation intact, DTR     present and equal bilaterally      Results Review:     No results found for: WBC, HGB, HCT, PLT          No results found for: LIPASE  No results found for: INR  No results found for: CULTURE    Radiology Review:  Imaging Results (Last 72 Hours)       ** No results found for the last 72 hours. **             I reviewed the patient's new clinical results.  I reviewed the patient's new imaging results and agree with the interpretation.     ASSESSMENT/PLAN:   ASSESSMENT:  1.  Screen for colon cancer    PLAN:  1.  Colonoscopy    Risk and benefits associated with the procedure are reviewed with the patient.  The patient wished to proceed     Ryan Baker DO  03/12/21  13:23 CST

## 2021-03-12 NOTE — ANESTHESIA PREPROCEDURE EVALUATION
Anesthesia Evaluation     Patient summary reviewed and Nursing notes reviewed   history of anesthetic complications: PONV  NPO Solid Status: > 8 hours  NPO Liquid Status: > 2 hours           Airway   Mallampati: II  TM distance: >3 FB  Neck ROM: full  No difficulty expected  Dental - normal exam     Pulmonary - normal exam    breath sounds clear to auscultation  (+) recent URI resolved,   Cardiovascular - negative cardio ROS  Exercise tolerance: good (4-7 METS)    Rhythm: regular  Rate: normal    (+) murmur,   (-) angina      Neuro/Psych  (+) headaches (migraines occ),     GI/Hepatic/Renal/Endo    (+)  GERD well controlled,      Musculoskeletal     (+) back pain,   Abdominal  - normal exam   Substance History   (+) alcohol use (socially),      OB/GYN negative ob/gyn ROS         Other - negative ROS                         Anesthesia Plan    ASA 2     MAC   total IV anesthesia  intravenous induction     Anesthetic plan, all risks, benefits, and alternatives have been provided, discussed and informed consent has been obtained with: patient.

## 2021-03-12 NOTE — ANESTHESIA POSTPROCEDURE EVALUATION
Patient: Vanessa Beach    Procedure Summary     Date: 03/12/21 Room / Location: [facility] END[facility]COPY 2 / [facility]    Anesthesia Start: 1356 Anesthesia Stop: 1419    Procedure: COLONOSCOPY (N/A ) Diagnosis:       Screen for colon cancer      (Screen for colon cancer [Z12.11])    Surgeons: Ryan Baker DO Provider: Carolyn Lamb CRNA    Anesthesia Type: MAC ASA Status: 2          Anesthesia Type: MAC    Vitals  Vitals Value Taken Time   /61 03/12/21 1430   Temp 97.4 °F (36.3 °C) 03/12/21 1420   Pulse 76 03/12/21 1430   Resp 18 03/12/21 1430   SpO2 97 % 03/12/21 1430           Post Anesthesia Care and Evaluation    Patient location during evaluation: bedside  Patient participation: complete - patient participated  Level of consciousness: awake and awake and alert  Pain score: 0  Pain management: satisfactory to patient  Airway patency: patent  Anesthetic complications: No anesthetic complications  PONV Status: none  Cardiovascular status: acceptable and stable  Respiratory status: acceptable, room air and spontaneous ventilation  Hydration status: acceptable

## 2021-04-23 ENCOUNTER — CLINICAL SUPPORT (OUTPATIENT)
Dept: FAMILY MEDICINE CLINIC | Facility: CLINIC | Age: 51
End: 2021-04-23

## 2021-04-23 DIAGNOSIS — L29.9 ITCHING: Primary | ICD-10-CM

## 2021-04-23 PROCEDURE — 96372 THER/PROPH/DIAG INJ SC/IM: CPT | Performed by: FAMILY MEDICINE

## 2021-04-23 RX ORDER — TRIAMCINOLONE ACETONIDE 40 MG/ML
80 INJECTION, SUSPENSION INTRA-ARTICULAR; INTRAMUSCULAR ONCE
Status: COMPLETED | OUTPATIENT
Start: 2021-04-23 | End: 2021-04-23

## 2021-04-23 RX ADMIN — TRIAMCINOLONE ACETONIDE 80 MG: 40 INJECTION, SUSPENSION INTRA-ARTICULAR; INTRAMUSCULAR at 14:41

## 2021-07-27 ENCOUNTER — HOSPITAL ENCOUNTER (OUTPATIENT)
Facility: HOSPITAL | Age: 51
Discharge: HOME | End: 2021-07-28
Attending: EMERGENCY MEDICINE | Admitting: SURGERY

## 2021-07-27 ENCOUNTER — ANESTHESIA EVENT (OUTPATIENT)
Dept: PERIOP | Facility: HOSPITAL | Age: 51
End: 2021-07-27

## 2021-07-27 ENCOUNTER — ANESTHESIA (OUTPATIENT)
Dept: PERIOP | Facility: HOSPITAL | Age: 51
End: 2021-07-27

## 2021-07-27 ENCOUNTER — OFFICE VISIT (OUTPATIENT)
Dept: FAMILY MEDICINE CLINIC | Facility: CLINIC | Age: 51
End: 2021-07-27

## 2021-07-27 ENCOUNTER — HOSPITAL ENCOUNTER (OUTPATIENT)
Dept: CT IMAGING | Facility: HOSPITAL | Age: 51
Discharge: HOME | End: 2021-07-27

## 2021-07-27 ENCOUNTER — HOSPITAL ENCOUNTER (OUTPATIENT)
Dept: ULTRASOUND IMAGING | Facility: HOSPITAL | Age: 51
Discharge: HOME | End: 2021-07-27

## 2021-07-27 ENCOUNTER — LAB (OUTPATIENT)
Dept: LAB | Facility: HOSPITAL | Age: 51
End: 2021-07-27

## 2021-07-27 VITALS
HEIGHT: 65 IN | HEART RATE: 58 BPM | WEIGHT: 141.3 LBS | OXYGEN SATURATION: 100 % | SYSTOLIC BLOOD PRESSURE: 124 MMHG | BODY MASS INDEX: 23.54 KG/M2 | DIASTOLIC BLOOD PRESSURE: 82 MMHG

## 2021-07-27 DIAGNOSIS — R10.33 PERIUMBILICAL ABDOMINAL PAIN: ICD-10-CM

## 2021-07-27 DIAGNOSIS — R10.30 LOWER ABDOMINAL PAIN: ICD-10-CM

## 2021-07-27 DIAGNOSIS — R10.33 PERIUMBILICAL ABDOMINAL PAIN: Primary | ICD-10-CM

## 2021-07-27 DIAGNOSIS — K35.80 ACUTE APPENDICITIS, UNSPECIFIED ACUTE APPENDICITIS TYPE: Primary | ICD-10-CM

## 2021-07-27 DIAGNOSIS — Z00.00 ANNUAL PHYSICAL EXAM: ICD-10-CM

## 2021-07-27 DIAGNOSIS — E03.9 ACQUIRED HYPOTHYROIDISM: Chronic | ICD-10-CM

## 2021-07-27 LAB
ABO GROUP BLD: NORMAL
ALBUMIN SERPL-MCNC: 4.5 G/DL (ref 3.5–5.2)
ALBUMIN/GLOB SERPL: 1.8 G/DL
ALP SERPL-CCNC: 46 U/L (ref 39–117)
ALT SERPL W P-5'-P-CCNC: 12 U/L (ref 1–33)
ANION GAP SERPL CALCULATED.3IONS-SCNC: 11 MMOL/L (ref 5–15)
AST SERPL-CCNC: 21 U/L (ref 1–32)
BASOPHILS # BLD AUTO: 0.04 10*3/MM3 (ref 0–0.2)
BASOPHILS NFR BLD AUTO: 0.3 % (ref 0–1.5)
BILIRUB SERPL-MCNC: 0.9 MG/DL (ref 0–1.2)
BILIRUB UR QL STRIP: NEGATIVE
BLD GP AB SCN SERPL QL: NEGATIVE
BUN SERPL-MCNC: 7 MG/DL (ref 6–20)
BUN/CREAT SERPL: 12.1 (ref 7–25)
CALCIUM SPEC-SCNC: 8.9 MG/DL (ref 8.6–10.5)
CHLORIDE SERPL-SCNC: 97 MMOL/L (ref 98–107)
CLARITY UR: CLEAR
CO2 SERPL-SCNC: 26 MMOL/L (ref 22–29)
COLOR UR: YELLOW
CREAT SERPL-MCNC: 0.58 MG/DL (ref 0.57–1)
DEPRECATED RDW RBC AUTO: 42.1 FL (ref 37–54)
EOSINOPHIL # BLD AUTO: 0 10*3/MM3 (ref 0–0.4)
EOSINOPHIL NFR BLD AUTO: 0 % (ref 0.3–6.2)
ERYTHROCYTE [DISTWIDTH] IN BLOOD BY AUTOMATED COUNT: 12.1 % (ref 12.3–15.4)
FLUAV SUBTYP SPEC NAA+PROBE: NOT DETECTED
FLUBV RNA ISLT QL NAA+PROBE: NOT DETECTED
GFR SERPL CREATININE-BSD FRML MDRD: 110 ML/MIN/1.73
GLOBULIN UR ELPH-MCNC: 2.5 GM/DL
GLUCOSE SERPL-MCNC: 122 MG/DL (ref 65–99)
GLUCOSE UR STRIP-MCNC: NEGATIVE MG/DL
HCT VFR BLD AUTO: 37.4 % (ref 34–46.6)
HGB BLD-MCNC: 13 G/DL (ref 12–15.9)
HGB UR QL STRIP.AUTO: NEGATIVE
IMM GRANULOCYTES # BLD AUTO: 0.04 10*3/MM3 (ref 0–0.05)
IMM GRANULOCYTES NFR BLD AUTO: 0.3 % (ref 0–0.5)
KETONES UR QL STRIP: ABNORMAL
LEUKOCYTE ESTERASE UR QL STRIP.AUTO: NEGATIVE
LYMPHOCYTES # BLD AUTO: 0.7 10*3/MM3 (ref 0.7–3.1)
LYMPHOCYTES NFR BLD AUTO: 4.9 % (ref 19.6–45.3)
Lab: NORMAL
MCH RBC QN AUTO: 32.6 PG (ref 26.6–33)
MCHC RBC AUTO-ENTMCNC: 34.8 G/DL (ref 31.5–35.7)
MCV RBC AUTO: 93.7 FL (ref 79–97)
MONOCYTES # BLD AUTO: 0.74 10*3/MM3 (ref 0.1–0.9)
MONOCYTES NFR BLD AUTO: 5.2 % (ref 5–12)
NEUTROPHILS NFR BLD AUTO: 12.64 10*3/MM3 (ref 1.7–7)
NEUTROPHILS NFR BLD AUTO: 89.3 % (ref 42.7–76)
NITRITE UR QL STRIP: NEGATIVE
NRBC BLD AUTO-RTO: 0 /100 WBC (ref 0–0.2)
PH UR STRIP.AUTO: 6.5 [PH] (ref 5–9)
PLATELET # BLD AUTO: 325 10*3/MM3 (ref 140–450)
PMV BLD AUTO: 10 FL (ref 6–12)
POTASSIUM SERPL-SCNC: 4 MMOL/L (ref 3.5–5.2)
PROT SERPL-MCNC: 7 G/DL (ref 6–8.5)
PROT UR QL STRIP: NEGATIVE
RBC # BLD AUTO: 3.99 10*6/MM3 (ref 3.77–5.28)
RH BLD: POSITIVE
SARS-COV-2 RNA PNL SPEC NAA+PROBE: NOT DETECTED
SODIUM SERPL-SCNC: 134 MMOL/L (ref 136–145)
SP GR UR STRIP: 1.02 (ref 1–1.03)
T&S EXPIRATION DATE: NORMAL
UROBILINOGEN UR QL STRIP: ABNORMAL
WBC # BLD AUTO: 14.16 10*3/MM3 (ref 3.4–10.8)

## 2021-07-27 PROCEDURE — 85025 COMPLETE CBC W/AUTO DIFF WBC: CPT

## 2021-07-27 PROCEDURE — 74177 CT ABD & PELVIS W/CONTRAST: CPT

## 2021-07-27 PROCEDURE — 44970 LAPAROSCOPY APPENDECTOMY: CPT | Performed by: SURGERY

## 2021-07-27 PROCEDURE — 0 DIATRIZOATE MEGLUMINE & SODIUM PER 1 ML: Performed by: NURSE PRACTITIONER

## 2021-07-27 PROCEDURE — C1889 IMPLANT/INSERT DEVICE, NOC: HCPCS | Performed by: SURGERY

## 2021-07-27 PROCEDURE — C9803 HOPD COVID-19 SPEC COLLECT: HCPCS

## 2021-07-27 PROCEDURE — 80053 COMPREHEN METABOLIC PANEL: CPT | Performed by: EMERGENCY MEDICINE

## 2021-07-27 PROCEDURE — 86900 BLOOD TYPING SEROLOGIC ABO: CPT | Performed by: EMERGENCY MEDICINE

## 2021-07-27 PROCEDURE — 96361 HYDRATE IV INFUSION ADD-ON: CPT

## 2021-07-27 PROCEDURE — 36415 COLL VENOUS BLD VENIPUNCTURE: CPT

## 2021-07-27 PROCEDURE — 99284 EMERGENCY DEPT VISIT MOD MDM: CPT

## 2021-07-27 PROCEDURE — 25010000002 CEFOXITIN: Performed by: SURGERY

## 2021-07-27 PROCEDURE — 25010000002 FENTANYL CITRATE (PF) 50 MCG/ML SOLUTION: Performed by: NURSE ANESTHETIST, CERTIFIED REGISTERED

## 2021-07-27 PROCEDURE — 25010000002 MIDAZOLAM PER 1 MG: Performed by: NURSE ANESTHETIST, CERTIFIED REGISTERED

## 2021-07-27 PROCEDURE — 25010000002 IOPAMIDOL 61 % SOLUTION: Performed by: NURSE PRACTITIONER

## 2021-07-27 PROCEDURE — 82150 ASSAY OF AMYLASE: CPT

## 2021-07-27 PROCEDURE — 99213 OFFICE O/P EST LOW 20 MIN: CPT | Performed by: NURSE PRACTITIONER

## 2021-07-27 PROCEDURE — G0378 HOSPITAL OBSERVATION PER HR: HCPCS

## 2021-07-27 PROCEDURE — 25010000002 DEXAMETHASONE PER 1 MG: Performed by: NURSE ANESTHETIST, CERTIFIED REGISTERED

## 2021-07-27 PROCEDURE — 83690 ASSAY OF LIPASE: CPT

## 2021-07-27 PROCEDURE — 86901 BLOOD TYPING SEROLOGIC RH(D): CPT

## 2021-07-27 PROCEDURE — 96365 THER/PROPH/DIAG IV INF INIT: CPT

## 2021-07-27 PROCEDURE — 81003 URINALYSIS AUTO W/O SCOPE: CPT

## 2021-07-27 PROCEDURE — 81003 URINALYSIS AUTO W/O SCOPE: CPT | Performed by: EMERGENCY MEDICINE

## 2021-07-27 PROCEDURE — 86900 BLOOD TYPING SEROLOGIC ABO: CPT

## 2021-07-27 PROCEDURE — 86901 BLOOD TYPING SEROLOGIC RH(D): CPT | Performed by: EMERGENCY MEDICINE

## 2021-07-27 PROCEDURE — S0260 H&P FOR SURGERY: HCPCS | Performed by: SURGERY

## 2021-07-27 PROCEDURE — 25010000002 PIPERACILLIN SOD-TAZOBACTAM PER 1 G: Performed by: EMERGENCY MEDICINE

## 2021-07-27 PROCEDURE — 84439 ASSAY OF FREE THYROXINE: CPT

## 2021-07-27 PROCEDURE — 76705 ECHO EXAM OF ABDOMEN: CPT

## 2021-07-27 PROCEDURE — 80053 COMPREHEN METABOLIC PANEL: CPT

## 2021-07-27 PROCEDURE — 85025 COMPLETE CBC W/AUTO DIFF WBC: CPT | Performed by: EMERGENCY MEDICINE

## 2021-07-27 PROCEDURE — 25010000002 PROPOFOL 10 MG/ML EMULSION: Performed by: NURSE ANESTHETIST, CERTIFIED REGISTERED

## 2021-07-27 PROCEDURE — 86850 RBC ANTIBODY SCREEN: CPT | Performed by: EMERGENCY MEDICINE

## 2021-07-27 PROCEDURE — 87636 SARSCOV2 & INF A&B AMP PRB: CPT | Performed by: EMERGENCY MEDICINE

## 2021-07-27 RX ORDER — DEXAMETHASONE SODIUM PHOSPHATE 4 MG/ML
INJECTION, SOLUTION INTRA-ARTICULAR; INTRALESIONAL; INTRAMUSCULAR; INTRAVENOUS; SOFT TISSUE AS NEEDED
Status: DISCONTINUED | OUTPATIENT
Start: 2021-07-27 | End: 2021-07-28 | Stop reason: SURG

## 2021-07-27 RX ORDER — ROCURONIUM BROMIDE 10 MG/ML
INJECTION, SOLUTION INTRAVENOUS AS NEEDED
Status: DISCONTINUED | OUTPATIENT
Start: 2021-07-27 | End: 2021-07-28 | Stop reason: SURG

## 2021-07-27 RX ORDER — FENTANYL CITRATE 50 UG/ML
INJECTION, SOLUTION INTRAMUSCULAR; INTRAVENOUS AS NEEDED
Status: DISCONTINUED | OUTPATIENT
Start: 2021-07-27 | End: 2021-07-28 | Stop reason: SURG

## 2021-07-27 RX ORDER — MIDAZOLAM HYDROCHLORIDE 1 MG/ML
INJECTION INTRAMUSCULAR; INTRAVENOUS AS NEEDED
Status: DISCONTINUED | OUTPATIENT
Start: 2021-07-27 | End: 2021-07-28 | Stop reason: SURG

## 2021-07-27 RX ORDER — SODIUM CHLORIDE 0.9 % (FLUSH) 0.9 %
10 SYRINGE (ML) INJECTION AS NEEDED
Status: DISCONTINUED | OUTPATIENT
Start: 2021-07-27 | End: 2021-07-28 | Stop reason: HOSPADM

## 2021-07-27 RX ORDER — ACYCLOVIR 400 MG/1
400 TABLET ORAL 2 TIMES DAILY
COMMUNITY
Start: 2021-07-15 | End: 2021-11-15

## 2021-07-27 RX ORDER — SODIUM CHLORIDE, SODIUM LACTATE, POTASSIUM CHLORIDE, CALCIUM CHLORIDE 600; 310; 30; 20 MG/100ML; MG/100ML; MG/100ML; MG/100ML
100 INJECTION, SOLUTION INTRAVENOUS CONTINUOUS
Status: DISCONTINUED | OUTPATIENT
Start: 2021-07-27 | End: 2021-07-28 | Stop reason: HOSPADM

## 2021-07-27 RX ORDER — LIDOCAINE HYDROCHLORIDE 20 MG/ML
INJECTION, SOLUTION INFILTRATION; PERINEURAL AS NEEDED
Status: DISCONTINUED | OUTPATIENT
Start: 2021-07-27 | End: 2021-07-28 | Stop reason: SURG

## 2021-07-27 RX ORDER — SODIUM CHLORIDE 9 MG/ML
125 INJECTION, SOLUTION INTRAVENOUS CONTINUOUS
Status: DISCONTINUED | OUTPATIENT
Start: 2021-07-27 | End: 2021-07-28 | Stop reason: HOSPADM

## 2021-07-27 RX ORDER — SODIUM CHLORIDE 0.9 % (FLUSH) 0.9 %
10 SYRINGE (ML) INJECTION EVERY 12 HOURS SCHEDULED
Status: DISCONTINUED | OUTPATIENT
Start: 2021-07-27 | End: 2021-07-28 | Stop reason: HOSPADM

## 2021-07-27 RX ORDER — PROPOFOL 10 MG/ML
VIAL (ML) INTRAVENOUS AS NEEDED
Status: DISCONTINUED | OUTPATIENT
Start: 2021-07-27 | End: 2021-07-28 | Stop reason: SURG

## 2021-07-27 RX ORDER — ONDANSETRON HYDROCHLORIDE 8 MG/1
8 TABLET, FILM COATED ORAL EVERY 8 HOURS PRN
Qty: 20 TABLET | Refills: 0 | Status: SHIPPED | OUTPATIENT
Start: 2021-07-27 | End: 2021-11-15

## 2021-07-27 RX ORDER — PROMETHAZINE HYDROCHLORIDE 12.5 MG/1
12.5 TABLET ORAL EVERY 6 HOURS PRN
Qty: 15 TABLET | Refills: 0 | Status: SHIPPED | OUTPATIENT
Start: 2021-07-27 | End: 2021-11-15

## 2021-07-27 RX ADMIN — DIATRIZOATE MEGLUMINE AND DIATRIZOATE SODIUM 30 ML: 660; 100 LIQUID ORAL; RECTAL at 16:50

## 2021-07-27 RX ADMIN — DEXAMETHASONE SODIUM PHOSPHATE 4 MG: 4 INJECTION, SOLUTION INTRAMUSCULAR; INTRAVENOUS at 23:41

## 2021-07-27 RX ADMIN — ROCURONIUM BROMIDE 30 MG: 50 INJECTION INTRAVENOUS at 23:39

## 2021-07-27 RX ADMIN — SODIUM CHLORIDE 125 ML/HR: 900 INJECTION, SOLUTION INTRAVENOUS at 22:03

## 2021-07-27 RX ADMIN — FENTANYL CITRATE 50 MCG: 50 INJECTION INTRAMUSCULAR; INTRAVENOUS at 23:47

## 2021-07-27 RX ADMIN — IOPAMIDOL 80 ML: 612 INJECTION, SOLUTION INTRAVENOUS at 18:47

## 2021-07-27 RX ADMIN — CEFOXITIN 2 G: 2 INJECTION, POWDER, FOR SOLUTION INTRAVENOUS at 23:38

## 2021-07-27 RX ADMIN — MIDAZOLAM HYDROCHLORIDE 2 MG: 2 INJECTION, SOLUTION INTRAMUSCULAR; INTRAVENOUS at 23:20

## 2021-07-27 RX ADMIN — PROPOFOL 140 MG: 10 INJECTION, EMULSION INTRAVENOUS at 23:28

## 2021-07-27 RX ADMIN — FENTANYL CITRATE 50 MCG: 50 INJECTION INTRAMUSCULAR; INTRAVENOUS at 23:24

## 2021-07-27 RX ADMIN — LIDOCAINE HYDROCHLORIDE 60 MG: 20 INJECTION, SOLUTION INFILTRATION; PERINEURAL at 23:28

## 2021-07-27 RX ADMIN — ROCURONIUM BROMIDE 10 MG: 50 INJECTION INTRAVENOUS at 23:28

## 2021-07-27 NOTE — PROGRESS NOTES
"Chief Complaint  Abdominal Pain    Subjective  started around 5 this morning.        Vanessa Beach presents to [facility]  Abdominal Pain  This is a new problem. The current episode started today. The onset quality is sudden. The problem occurs constantly. The problem has been waxing and waning. The pain is located in the periumbilical region, RLQ and LLQ. The pain is at a severity of 9/10. The quality of the pain is aching and sharp. Associated symptoms include nausea and vomiting. Pertinent negatives include no arthralgias, constipation, diarrhea, dysuria, frequency or myalgias. Nothing aggravates the pain. The pain is relieved by nothing. Prior diagnostic workup includes ultrasound and CT scan.       Review of Systems   Constitutional: Negative for activity change, appetite change and chills.   HENT: Negative for congestion, ear pain, sore throat and trouble swallowing.    Eyes: Negative for discharge, itching and visual disturbance.   Respiratory: Negative for apnea, cough and wheezing.    Cardiovascular: Negative for chest pain and leg swelling.   Gastrointestinal: Positive for abdominal pain, nausea and vomiting. Negative for abdominal distention, constipation and diarrhea.   Endocrine: Negative for cold intolerance, heat intolerance and polyuria.   Genitourinary: Negative for dysuria, frequency and urgency.   Musculoskeletal: Negative for arthralgias, back pain and myalgias.   Skin: Negative for color change, pallor and wound.   Neurological: Negative for dizziness, seizures, syncope, weakness and light-headedness.   Psychiatric/Behavioral: Negative for agitation, confusion and sleep disturbance. The patient is not nervous/anxious.          Objective   Vital Signs:   /82   Pulse 58   Ht 165.1 cm (65\")   Wt 64.1 kg (141 lb 4.8 oz)   SpO2 100%   BMI 23.51 kg/m²     Physical Exam  Vitals and nursing note reviewed.   Constitutional:       Appearance: She is " well-developed.   HENT:      Head: Normocephalic and atraumatic.   Eyes:      General: Lids are normal.      Conjunctiva/sclera: Conjunctivae normal.   Neck:      Thyroid: No thyroid mass or thyromegaly.      Trachea: Trachea normal. No tracheal tenderness.   Cardiovascular:      Rate and Rhythm: Normal rate.      Pulses: Normal pulses.      Heart sounds: Normal heart sounds.   Pulmonary:      Effort: Pulmonary effort is normal. No respiratory distress.      Breath sounds: Normal breath sounds. No wheezing.   Abdominal:      General: Bowel sounds are normal. There is no distension.      Palpations: Abdomen is soft. There is no mass.      Tenderness: There is abdominal tenderness.   Musculoskeletal:         General: Normal range of motion.      Cervical back: Normal range of motion. No edema.   Lymphadenopathy:      Head:      Right side of head: No submental, submandibular or tonsillar adenopathy.      Left side of head: No submental, submandibular or tonsillar adenopathy.   Skin:     General: Skin is warm and dry.      Coloration: Skin is not pale.      Findings: No abrasion, erythema or lesion.   Neurological:      Mental Status: She is alert and oriented to person, place, and time.   Psychiatric:         Mood and Affect: Mood is not anxious. Affect is not inappropriate.         Speech: Speech normal.         Behavior: Behavior normal.         Thought Content: Thought content normal.         Judgment: Judgment normal. Judgment is not impulsive.        Result Review :                 Assessment and Plan    Diagnoses and all orders for this visit:    1. Periumbilical abdominal pain (Primary)  -     Lipase; Future  -     Amylase; Future  -     CBC w AUTO Differential; Future  -     promethazine (PHENERGAN) 12.5 MG tablet; Take 1 tablet by mouth Every 6 (Six) Hours As Needed for Nausea or Vomiting.  Dispense: 15 tablet; Refill: 0  -     ondansetron (Zofran) 8 MG tablet; Take 1 tablet by mouth Every 8 (Eight) Hours As  Needed for Nausea or Vomiting.  Dispense: 20 tablet; Refill: 0  -     Comprehensive metabolic panel; Future  -     US Gallbladder; Future  -     CT Abdomen Pelvis With Contrast; Future  -     Urinalysis With Culture If Indicated -; Future    2. Lower abdominal pain  -     Urinalysis With Culture If Indicated -; Future          US gallbladder ordered and came back normal     CT ordered  Urinalysis ordered   Please complete ordered lab work   Start Zofran and Phenergan as needed    I will call with results       Return if symptoms worsen or fail to improve, for Recheck, Next scheduled follow up.      I spent 25 minutes caring for Vanessa on this date of service. This time includes time spent by me in the following activities:reviewing tests, performing a medically appropriate examination and/or evaluation , counseling and educating the patient/family/caregiver, ordering medications, tests, or procedures and documenting information in the medical record  Follow Up   Return if symptoms worsen or fail to improve, for Recheck, Next scheduled follow up.  Patient was given instructions and counseling regarding her condition or for health maintenance advice. Please see specific information pulled into the AVS if appropriate.           This document has been electronically signed by ROLANDO Weiner on July 27, 2021 16:21 CDT

## 2021-07-28 ENCOUNTER — TELEPHONE (OUTPATIENT)
Dept: FAMILY MEDICINE CLINIC | Facility: CLINIC | Age: 51
End: 2021-07-28

## 2021-07-28 ENCOUNTER — READMISSION MANAGEMENT (OUTPATIENT)
Dept: CALL CENTER | Facility: HOSPITAL | Age: 51
End: 2021-07-28

## 2021-07-28 VITALS
WEIGHT: 142.6 LBS | TEMPERATURE: 97.2 F | SYSTOLIC BLOOD PRESSURE: 102 MMHG | OXYGEN SATURATION: 97 % | RESPIRATION RATE: 16 BRPM | HEIGHT: 65 IN | HEART RATE: 69 BPM | DIASTOLIC BLOOD PRESSURE: 64 MMHG | BODY MASS INDEX: 23.76 KG/M2

## 2021-07-28 PROBLEM — K35.80 ACUTE APPENDICITIS: Status: RESOLVED | Noted: 2021-07-27 | Resolved: 2021-07-28

## 2021-07-28 LAB
ALBUMIN SERPL-MCNC: 4.6 G/DL (ref 3.5–5.2)
ALBUMIN/GLOB SERPL: 2 G/DL
ALP SERPL-CCNC: 47 U/L (ref 39–117)
ALT SERPL W P-5'-P-CCNC: 14 U/L (ref 1–33)
AMYLASE SERPL-CCNC: 70 U/L (ref 28–100)
ANION GAP SERPL CALCULATED.3IONS-SCNC: 11.9 MMOL/L (ref 5–15)
AST SERPL-CCNC: 17 U/L (ref 1–32)
BASOPHILS # BLD AUTO: 0.05 10*3/MM3 (ref 0–0.2)
BASOPHILS NFR BLD AUTO: 0.3 % (ref 0–1.5)
BILIRUB SERPL-MCNC: 0.6 MG/DL (ref 0–1.2)
BILIRUB UR QL STRIP: NEGATIVE
BUN SERPL-MCNC: 9 MG/DL (ref 6–20)
BUN/CREAT SERPL: 14.3 (ref 7–25)
CALCIUM SPEC-SCNC: 9.2 MG/DL (ref 8.6–10.5)
CHLORIDE SERPL-SCNC: 102 MMOL/L (ref 98–107)
CLARITY UR: CLEAR
CO2 SERPL-SCNC: 25.1 MMOL/L (ref 22–29)
COLOR UR: YELLOW
CREAT SERPL-MCNC: 0.63 MG/DL (ref 0.57–1)
DEPRECATED RDW RBC AUTO: 41.5 FL (ref 37–54)
EOSINOPHIL # BLD AUTO: 0 10*3/MM3 (ref 0–0.4)
EOSINOPHIL NFR BLD AUTO: 0 % (ref 0.3–6.2)
ERYTHROCYTE [DISTWIDTH] IN BLOOD BY AUTOMATED COUNT: 12.1 % (ref 12.3–15.4)
GFR SERPL CREATININE-BSD FRML MDRD: 100 ML/MIN/1.73
GLOBULIN UR ELPH-MCNC: 2.3 GM/DL
GLUCOSE SERPL-MCNC: 93 MG/DL (ref 65–99)
GLUCOSE UR STRIP-MCNC: NEGATIVE MG/DL
HCT VFR BLD AUTO: 39.1 % (ref 34–46.6)
HGB BLD-MCNC: 13.5 G/DL (ref 12–15.9)
HGB UR QL STRIP.AUTO: NEGATIVE
IMM GRANULOCYTES # BLD AUTO: 0.08 10*3/MM3 (ref 0–0.05)
IMM GRANULOCYTES NFR BLD AUTO: 0.5 % (ref 0–0.5)
KETONES UR QL STRIP: ABNORMAL
LEUKOCYTE ESTERASE UR QL STRIP.AUTO: NEGATIVE
LIPASE SERPL-CCNC: 24 U/L (ref 13–60)
LYMPHOCYTES # BLD AUTO: 0.57 10*3/MM3 (ref 0.7–3.1)
LYMPHOCYTES NFR BLD AUTO: 3.9 % (ref 19.6–45.3)
MCH RBC QN AUTO: 32.5 PG (ref 26.6–33)
MCHC RBC AUTO-ENTMCNC: 34.5 G/DL (ref 31.5–35.7)
MCV RBC AUTO: 94 FL (ref 79–97)
MONOCYTES # BLD AUTO: 0.42 10*3/MM3 (ref 0.1–0.9)
MONOCYTES NFR BLD AUTO: 2.9 % (ref 5–12)
NEUTROPHILS NFR BLD AUTO: 13.54 10*3/MM3 (ref 1.7–7)
NEUTROPHILS NFR BLD AUTO: 92.4 % (ref 42.7–76)
NITRITE UR QL STRIP: NEGATIVE
NRBC BLD AUTO-RTO: 0 /100 WBC (ref 0–0.2)
PH UR STRIP.AUTO: 6.5 [PH] (ref 5–8)
PLATELET # BLD AUTO: 352 10*3/MM3 (ref 140–450)
PMV BLD AUTO: 10.9 FL (ref 6–12)
POTASSIUM SERPL-SCNC: 4.6 MMOL/L (ref 3.5–5.2)
PROT SERPL-MCNC: 6.9 G/DL (ref 6–8.5)
PROT UR QL STRIP: ABNORMAL
RBC # BLD AUTO: 4.16 10*6/MM3 (ref 3.77–5.28)
SODIUM SERPL-SCNC: 139 MMOL/L (ref 136–145)
SP GR UR STRIP: >=1.03 (ref 1–1.03)
T4 FREE SERPL-MCNC: 1.63 NG/DL (ref 0.93–1.7)
UROBILINOGEN UR QL STRIP: ABNORMAL
WBC # BLD AUTO: 14.66 10*3/MM3 (ref 3.4–10.8)

## 2021-07-28 PROCEDURE — 25010000002 KETOROLAC TROMETHAMINE PER 15 MG: Performed by: NURSE ANESTHETIST, CERTIFIED REGISTERED

## 2021-07-28 PROCEDURE — 25010000002 HYDROMORPHONE PER 4 MG: Performed by: NURSE ANESTHETIST, CERTIFIED REGISTERED

## 2021-07-28 PROCEDURE — 25010000002 ONDANSETRON PER 1 MG: Performed by: NURSE ANESTHETIST, CERTIFIED REGISTERED

## 2021-07-28 PROCEDURE — 25010000002 NEOSTIGMINE 10 MG/10ML SOLUTION: Performed by: NURSE ANESTHETIST, CERTIFIED REGISTERED

## 2021-07-28 PROCEDURE — G0378 HOSPITAL OBSERVATION PER HR: HCPCS

## 2021-07-28 PROCEDURE — 88304 TISSUE EXAM BY PATHOLOGIST: CPT

## 2021-07-28 PROCEDURE — 94799 UNLISTED PULMONARY SVC/PX: CPT

## 2021-07-28 PROCEDURE — 99024 POSTOP FOLLOW-UP VISIT: CPT | Performed by: SURGERY

## 2021-07-28 PROCEDURE — 25010000002 PIPERACILLIN SOD-TAZOBACTAM PER 1 G: Performed by: SURGERY

## 2021-07-28 DEVICE — ENDOPATH ECHELON VASCULAR  RELOADS, WHITE, 35MM
Type: IMPLANTABLE DEVICE | Site: ABDOMEN | Status: FUNCTIONAL
Brand: ECHELON ENDOPATH

## 2021-07-28 RX ORDER — BUPIVACAINE HYDROCHLORIDE 5 MG/ML
INJECTION, SOLUTION EPIDURAL; INTRACAUDAL AS NEEDED
Status: DISCONTINUED | OUTPATIENT
Start: 2021-07-28 | End: 2021-07-28 | Stop reason: HOSPADM

## 2021-07-28 RX ORDER — HYDROCODONE BITARTRATE AND ACETAMINOPHEN 7.5; 325 MG/1; MG/1
1 TABLET ORAL EVERY 4 HOURS PRN
Qty: 18 TABLET | Refills: 0 | Status: SHIPPED | OUTPATIENT
Start: 2021-07-28 | End: 2021-08-04

## 2021-07-28 RX ORDER — ONDANSETRON 2 MG/ML
INJECTION INTRAMUSCULAR; INTRAVENOUS AS NEEDED
Status: DISCONTINUED | OUTPATIENT
Start: 2021-07-28 | End: 2021-07-28 | Stop reason: SURG

## 2021-07-28 RX ORDER — PROMETHAZINE HYDROCHLORIDE 25 MG/1
25 TABLET ORAL ONCE AS NEEDED
Status: DISCONTINUED | OUTPATIENT
Start: 2021-07-28 | End: 2021-07-28 | Stop reason: HOSPADM

## 2021-07-28 RX ORDER — MEPERIDINE HYDROCHLORIDE 25 MG/ML
12.5 INJECTION INTRAMUSCULAR; INTRAVENOUS; SUBCUTANEOUS
Status: DISCONTINUED | OUTPATIENT
Start: 2021-07-28 | End: 2021-07-28 | Stop reason: HOSPADM

## 2021-07-28 RX ORDER — ACETAMINOPHEN 650 MG/1
650 SUPPOSITORY RECTAL EVERY 4 HOURS PRN
Status: DISCONTINUED | OUTPATIENT
Start: 2021-07-28 | End: 2021-07-28 | Stop reason: HOSPADM

## 2021-07-28 RX ORDER — DIPHENHYDRAMINE HCL 25 MG
25 CAPSULE ORAL
Status: DISCONTINUED | OUTPATIENT
Start: 2021-07-28 | End: 2021-07-28 | Stop reason: HOSPADM

## 2021-07-28 RX ORDER — DIPHENHYDRAMINE HYDROCHLORIDE 50 MG/ML
12.5 INJECTION INTRAMUSCULAR; INTRAVENOUS
Status: DISCONTINUED | OUTPATIENT
Start: 2021-07-28 | End: 2021-07-28 | Stop reason: HOSPADM

## 2021-07-28 RX ORDER — PROMETHAZINE HYDROCHLORIDE 25 MG/1
25 SUPPOSITORY RECTAL ONCE AS NEEDED
Status: DISCONTINUED | OUTPATIENT
Start: 2021-07-28 | End: 2021-07-28 | Stop reason: HOSPADM

## 2021-07-28 RX ORDER — KETOROLAC TROMETHAMINE 30 MG/ML
INJECTION, SOLUTION INTRAMUSCULAR; INTRAVENOUS AS NEEDED
Status: DISCONTINUED | OUTPATIENT
Start: 2021-07-28 | End: 2021-07-28 | Stop reason: SURG

## 2021-07-28 RX ORDER — SODIUM CHLORIDE, SODIUM GLUCONATE, SODIUM ACETATE, POTASSIUM CHLORIDE AND MAGNESIUM CHLORIDE 526; 502; 368; 37; 30 MG/100ML; MG/100ML; MG/100ML; MG/100ML; MG/100ML
INJECTION, SOLUTION INTRAVENOUS CONTINUOUS PRN
Status: DISCONTINUED | OUTPATIENT
Start: 2021-07-28 | End: 2021-07-28 | Stop reason: SURG

## 2021-07-28 RX ORDER — SODIUM CHLORIDE, SODIUM LACTATE, POTASSIUM CHLORIDE, CALCIUM CHLORIDE 600; 310; 30; 20 MG/100ML; MG/100ML; MG/100ML; MG/100ML
100 INJECTION, SOLUTION INTRAVENOUS CONTINUOUS
Status: DISCONTINUED | OUTPATIENT
Start: 2021-07-28 | End: 2021-07-28 | Stop reason: HOSPADM

## 2021-07-28 RX ORDER — HYDROCODONE BITARTRATE AND ACETAMINOPHEN 7.5; 325 MG/1; MG/1
1 TABLET ORAL EVERY 4 HOURS PRN
Status: DISCONTINUED | OUTPATIENT
Start: 2021-07-28 | End: 2021-07-28 | Stop reason: HOSPADM

## 2021-07-28 RX ORDER — NALOXONE HCL 0.4 MG/ML
0.1 VIAL (ML) INJECTION
Status: DISCONTINUED | OUTPATIENT
Start: 2021-07-28 | End: 2021-07-28 | Stop reason: HOSPADM

## 2021-07-28 RX ORDER — LEVOTHYROXINE SODIUM 88 UG/1
88 TABLET ORAL DAILY
Status: DISCONTINUED | OUTPATIENT
Start: 2021-07-28 | End: 2021-07-28 | Stop reason: HOSPADM

## 2021-07-28 RX ORDER — ACETAMINOPHEN 650 MG/1
650 SUPPOSITORY RECTAL ONCE AS NEEDED
Status: DISCONTINUED | OUTPATIENT
Start: 2021-07-28 | End: 2021-07-28 | Stop reason: HOSPADM

## 2021-07-28 RX ORDER — ONDANSETRON 2 MG/ML
4 INJECTION INTRAMUSCULAR; INTRAVENOUS ONCE AS NEEDED
Status: DISCONTINUED | OUTPATIENT
Start: 2021-07-28 | End: 2021-07-28 | Stop reason: HOSPADM

## 2021-07-28 RX ORDER — ALBUTEROL SULFATE 2.5 MG/3ML
2.5 SOLUTION RESPIRATORY (INHALATION) ONCE AS NEEDED
Status: DISCONTINUED | OUTPATIENT
Start: 2021-07-28 | End: 2021-07-28 | Stop reason: HOSPADM

## 2021-07-28 RX ORDER — ONDANSETRON 2 MG/ML
4 INJECTION INTRAMUSCULAR; INTRAVENOUS EVERY 6 HOURS PRN
Status: DISCONTINUED | OUTPATIENT
Start: 2021-07-28 | End: 2021-07-28 | Stop reason: HOSPADM

## 2021-07-28 RX ORDER — ACETAMINOPHEN 325 MG/1
650 TABLET ORAL ONCE AS NEEDED
Status: DISCONTINUED | OUTPATIENT
Start: 2021-07-28 | End: 2021-07-28 | Stop reason: HOSPADM

## 2021-07-28 RX ORDER — NEOSTIGMINE METHYLSULFATE 1 MG/ML
INJECTION, SOLUTION INTRAVENOUS AS NEEDED
Status: DISCONTINUED | OUTPATIENT
Start: 2021-07-28 | End: 2021-07-28 | Stop reason: SURG

## 2021-07-28 RX ORDER — NALOXONE HCL 0.4 MG/ML
0.4 VIAL (ML) INJECTION AS NEEDED
Status: DISCONTINUED | OUTPATIENT
Start: 2021-07-28 | End: 2021-07-28 | Stop reason: HOSPADM

## 2021-07-28 RX ORDER — HYDROMORPHONE HCL 110MG/55ML
PATIENT CONTROLLED ANALGESIA SYRINGE INTRAVENOUS AS NEEDED
Status: DISCONTINUED | OUTPATIENT
Start: 2021-07-28 | End: 2021-07-28 | Stop reason: SURG

## 2021-07-28 RX ORDER — ONDANSETRON 4 MG/1
4 TABLET, FILM COATED ORAL EVERY 6 HOURS PRN
Status: DISCONTINUED | OUTPATIENT
Start: 2021-07-28 | End: 2021-07-28 | Stop reason: HOSPADM

## 2021-07-28 RX ORDER — ACETAMINOPHEN 325 MG/1
650 TABLET ORAL EVERY 4 HOURS PRN
Status: DISCONTINUED | OUTPATIENT
Start: 2021-07-28 | End: 2021-07-28 | Stop reason: HOSPADM

## 2021-07-28 RX ADMIN — HYDROMORPHONE HYDROCHLORIDE 0.5 MG: 2 INJECTION, SOLUTION INTRAMUSCULAR; INTRAVENOUS; SUBCUTANEOUS at 00:37

## 2021-07-28 RX ADMIN — ONDANSETRON 4 MG: 2 INJECTION INTRAMUSCULAR; INTRAVENOUS at 00:39

## 2021-07-28 RX ADMIN — SODIUM CHLORIDE, SODIUM GLUCONATE, SODIUM ACETATE, POTASSIUM CHLORIDE AND MAGNESIUM CHLORIDE: 526; 502; 368; 37; 30 INJECTION, SOLUTION INTRAVENOUS at 00:17

## 2021-07-28 RX ADMIN — PIPERACILLIN SODIUM AND TAZOBACTAM SODIUM 3.38 G: 3; .375 INJECTION, POWDER, LYOPHILIZED, FOR SOLUTION INTRAVENOUS at 06:11

## 2021-07-28 RX ADMIN — GLYCOPYRROLATE 0.4 MG: 0.2 INJECTION, SOLUTION INTRAMUSCULAR; INTRAVITREAL at 00:41

## 2021-07-28 RX ADMIN — SODIUM CHLORIDE, PRESERVATIVE FREE 10 ML: 5 INJECTION INTRAVENOUS at 02:26

## 2021-07-28 RX ADMIN — SODIUM CHLORIDE, POTASSIUM CHLORIDE, SODIUM LACTATE AND CALCIUM CHLORIDE 100 ML/HR: 600; 310; 30; 20 INJECTION, SOLUTION INTRAVENOUS at 02:25

## 2021-07-28 RX ADMIN — LEVOTHYROXINE SODIUM 88 MCG: 88 TABLET ORAL at 08:10

## 2021-07-28 RX ADMIN — KETOROLAC TROMETHAMINE 30 MG: 30 INJECTION, SOLUTION INTRAMUSCULAR; INTRAVENOUS at 00:41

## 2021-07-28 RX ADMIN — NEOSTIGMINE METHYLSULFATE 2 MG: 1 INJECTION, SOLUTION INTRAVENOUS at 00:41

## 2021-07-28 NOTE — ANESTHESIA POSTPROCEDURE EVALUATION
Patient: Vanessa Beach    Procedure Summary     Date: 07/27/21 Room / Location: [facility] OR 09 / [facility] OR    Anesthesia Start: 2322 Anesthesia Stop: 07/28/21 0110    Procedure: LAPAROSCOPIC  APPENDECTOMY (N/A Abdomen) Diagnosis:       Acute appendicitis, unspecified acute appendicitis type      (Acute appendicitis, unspecified acute appendicitis type [K35.80])    Surgeons: Satnam Nina MD Provider: Ernestine Busch CRNA    Anesthesia Type: general ASA Status: 2 - Emergent          Anesthesia Type: general    Vitals  Vitals Value Taken Time   BP 92/50 07/28/21 0111   Temp 99.3 °F (37.4 °C) 07/28/21 0100   Pulse 73 07/28/21 0111   Resp 18 07/28/21 0111   SpO2 97 % 07/28/21 0111           Post Anesthesia Care and Evaluation    Patient location during evaluation: PACU  Patient participation: complete - patient participated  Level of consciousness: awake  Pain score: 0  Pain management: satisfactory to patient  Airway patency: patent  Anesthetic complications: No anesthetic complications  PONV Status: none  Cardiovascular status: acceptable and hemodynamically stable  Respiratory status: acceptable, spontaneous ventilation and room air  Hydration status: acceptable

## 2021-07-28 NOTE — ANESTHESIA PREPROCEDURE EVALUATION
Anesthesia Evaluation     Patient summary reviewed and Nursing notes reviewed   history of anesthetic complications: PONV  NPO Solid Status: > 8 hours  NPO Liquid Status: > 2 hours           Airway   Mallampati: II  TM distance: >3 FB  Neck ROM: full  No difficulty expected  Dental - normal exam     Pulmonary - negative pulmonary ROS and normal exam    breath sounds clear to auscultation  (-) COPD, sleep apnea, not a smoker  Cardiovascular - negative cardio ROS and normal exam    Rhythm: regular  Rate: normal    (-) hypertension, CAD      Neuro/Psych- negative ROS  (-) seizures, CVA  GI/Hepatic/Renal/Endo    (+)  GERD well controlled,  thyroid problem hypothyroidism    Musculoskeletal (-) negative ROS    Abdominal    Substance History - negative use     OB/GYN negative ob/gyn ROS     Comment: Hysterectomy        Other - negative ROS       (-) history of cancer  ROS/Med Hx Other: No nausea since this a.m., patient states it's mostly just been pain.                  Anesthesia Plan    ASA 2 - emergent     general     intravenous induction     Anesthetic plan, all risks, benefits, and alternatives have been provided, discussed and informed consent has been obtained with: patient and spouse/significant other.

## 2021-07-28 NOTE — TELEPHONE ENCOUNTER
Called patient at approx 8pm with CT results showing appenditis, strongly recommended to go to ER, she agreed.

## 2021-07-28 NOTE — OUTREACH NOTE
Prep Survey      Responses   [facility] patient discharged from?  [place]   Is LACE score < 7 ?  Yes   Emergency Room discharge w/ pulse ox?  No   Eligibility  [facility]   Date of Admission  07/27/21   Date of Discharge  07/28/21   Discharge Disposition  Home or Self Care   Discharge diagnosis  Acute appendicitis   Does the patient have one of the following disease processes/diagnoses(primary or secondary)?  General Surgery   Does the patient have Home health ordered?  No   Is there a DME ordered?  No   Prep survey completed?  Yes          Narcisa Cortes RN

## 2021-07-28 NOTE — ANESTHESIA PROCEDURE NOTES
Airway  Urgency: elective    Date/Time: 7/27/2021 11:29 PM  Airway not difficult    General Information and Staff    Patient location during procedure: OR  CRNA: Ernestine Busch CRNA    Indications and Patient Condition  Indications for airway management: airway protection    Preoxygenated: yes  Mask difficulty assessment: 0 - not attempted    Final Airway Details  Final airway type: endotracheal airway      Successful airway: ETT  Cuffed: yes   Successful intubation technique: direct laryngoscopy and RSI  Facilitating devices/methods: intubating stylet  Endotracheal tube insertion site: oral  Blade: Giovani  Blade size: 3  ETT size (mm): 7.0  Cormack-Lehane Classification: grade I - full view of glottis  Placement verified by: chest auscultation and capnometry   Measured from: lips  ETT/EBT  to lips (cm): 21  Number of attempts at approach: 1  Assessment: lips, teeth, and gum same as pre-op and atraumatic intubation

## 2021-07-29 ENCOUNTER — TRANSITIONAL CARE MANAGEMENT TELEPHONE ENCOUNTER (OUTPATIENT)
Dept: CALL CENTER | Facility: HOSPITAL | Age: 51
End: 2021-07-29

## 2021-07-29 NOTE — OUTREACH NOTE
Call Center TCM Note      Responses   [facility] patient discharged from?  [place]   Does the patient have one of the following disease processes/diagnoses(primary or secondary)?  General Surgery   TCM attempt successful?  Yes [Kaden, spouse]   Call start time  0950   Call end time  0952   Discharge diagnosis  Acute appendicitis   Person spoke with today (if not patient) and relationship  spouse   Meds reviewed with patient/caregiver?  Yes   Is the patient having any side effects they believe may be caused by any medication additions or changes?  No   Does the patient have all medications related to this admission filled (includes all antibiotics, pain medications, etc.)  Yes   Is the patient taking all medications as directed (includes completed medication regime)?  Yes   Does the patient have a follow up appointment scheduled with their surgeon?  Yes   Has the patient kept scheduled appointments due by today?  N/A   Psychosocial issues?  No   Did the patient receive a copy of their discharge instructions?  Yes   Nursing interventions  Reviewed instructions with patient   What is the patient's perception of their health status since discharge?  Improving   Nursing interventions  Nurse provided patient education   Is the patient /caregiver able to teach back basic post-op care?  Continue use of incentive spirometry at least 1 week post discharge, Take showers only when approved by MD-sponge bathe until then, No tub bath, swimming, or hot tub until instructed by MD, Lifting as instructed by MD in discharge instructions   Is the patient/caregiver able to teach back signs and symptoms of incisional infection?  Increased redness, swelling or pain at the incisonal site, Increased drainage or bleeding, Incisional warmth, Pus or odor from incision, Fever   Is the patient/caregiver able to teach back steps to recovery at home?  Rest and rebuild strength, gradually increase activity, Set small, achievable goals  for return to baseline health, Eat a well-balance diet   TCM call completed?  Yes   Wrap up additional comments  Spouse states pt is doing better. Spouse did not understand why I was asking him medical questions about his wife,  RN shared he was listed as a person to call since she did not answer her phone. Spouse was reluctant to answer questions, and shares he is a , and is going into court. RN apologized, and thanked him for his time. No questions/concerns.           Christi Ortiz RN    7/29/2021, 10:00 EDT

## 2021-08-02 ENCOUNTER — OFFICE VISIT (OUTPATIENT)
Dept: SURGERY | Facility: CLINIC | Age: 51
End: 2021-08-02

## 2021-08-02 VITALS
SYSTOLIC BLOOD PRESSURE: 120 MMHG | WEIGHT: 138.4 LBS | TEMPERATURE: 97.6 F | HEIGHT: 65 IN | BODY MASS INDEX: 23.06 KG/M2 | DIASTOLIC BLOOD PRESSURE: 68 MMHG | HEART RATE: 68 BPM

## 2021-08-02 DIAGNOSIS — Z90.49 S/P APPENDECTOMY: Primary | ICD-10-CM

## 2021-08-02 LAB
LAB AP CASE REPORT: NORMAL
PATH REPORT.FINAL DX SPEC: NORMAL

## 2021-08-02 PROCEDURE — 99024 POSTOP FOLLOW-UP VISIT: CPT | Performed by: NURSE PRACTITIONER

## 2021-08-02 NOTE — PATIENT INSTRUCTIONS
"BMI for Adults  What is BMI?  Body mass index (BMI) is a number that is calculated from a person's weight and height. BMI can help estimate how much of a person's weight is composed of fat. BMI does not measure body fat directly. Rather, it is an alternative to procedures that directly measure body fat, which can be difficult and expensive.  BMI can help identify people who may be at higher risk for certain medical problems.  What are BMI measurements used for?  BMI is used as a screening tool to identify possible weight problems. It helps determine whether a person is obese, overweight, a healthy weight, or underweight.  BMI is useful for:  · Identifying a weight problem that may be related to a medical condition or may increase the risk for medical problems.  · Promoting changes, such as changes in diet and exercise, to help reach a healthy weight. BMI screening can be repeated to see if these changes are working.  How is BMI calculated?  BMI involves measuring your weight in relation to your height. Both height and weight are measured, and the BMI is calculated from those numbers. This can be done either in English (U[place] or metric measurements. Note that charts and online BMI calculators are available to help you find your BMI quickly and easily without having to do these calculations yourself.  To calculate your BMI in English (U.S[place] measurements:    1. Measure your weight in pounds (lb).  2. Multiply the number of pounds by 703.  ? For example, for a person who weighs 180 lb, multiply that number by 703, which equals 126,540.  3. Measure your height in inches. Then multiply that number by itself to get a measurement called \"inches squared.\"  ? For example, for a person who is 70 inches tall, the \"inches squared\" measurement is 70 inches x 70 inches, which equals 4,900 inches squared.  4. Divide the total from step 2 (number of lb x 703) by the total from step 3 (inches squared): 126,540 ÷ 4,900 = 25.8. This is " "your BMI.  To calculate your BMI in metric measurements:  1. Measure your weight in kilograms (kg).  2. Measure your height in meters (m). Then multiply that number by itself to get a measurement called \"meters squared.\"  ? For example, for a person who is 1.75 m tall, the \"meters squared\" measurement is 1.75 m x 1.75 m, which is equal to 3.1 meters squared.  3. Divide the number of kilograms (your weight) by the meters squared number. In this example: 70 ÷ 3.1 = 22.6. This is your BMI.  What do the results mean?  BMI charts are used to identify whether you are underweight, normal weight, overweight, or obese. The following guidelines will be used:  · Underweight: BMI less than 18.5.  · Normal weight: BMI between 18.5 and 24.9.  · Overweight: BMI between 25 and 29.9.  · Obese: BMI of 30 or above.  Keep these notes in mind:  · Weight includes both fat and muscle, so someone with a muscular build, such as an athlete, may have a BMI that is higher than 24.9. In cases like these, BMI is not an accurate measure of body fat.  · To determine if excess body fat is the cause of a BMI of 25 or higher, further assessments may need to be done by a health care provider.  · BMI is usually interpreted in the same way for men and women.  Where to find more information  For more information about BMI, including tools to quickly calculate your BMI, go to these websites:  · Centers for Disease Control and Prevention: www.cdc.gov  · American Heart Association: www.heart.org  · National Heart, Lung, and Blood [place]: www.nhlbi.nih.gov  Summary  · Body mass index (BMI) is a number that is calculated from a person's weight and height.  · BMI may help estimate how much of a person's weight is composed of fat. BMI can help identify those who may be at higher risk for certain medical problems.  · BMI can be measured using English measurements or metric measurements.  · BMI charts are used to identify whether you are underweight, normal " weight, overweight, or obese.  This information is not intended to replace advice given to you by your health care provider. Make sure you discuss any questions you have with your health care provider.  Document Revised: 09/09/2020 Document Reviewed: 07/17/2020  Elsevier Patient Education © 2021 [employer]

## 2021-08-02 NOTE — PROGRESS NOTES
CHIEF COMPLAINT:   Chief Complaint   Patient presents with   • Post-op     P.O. Laparoscopic Appendectomy 7-27-21       HPI: This patient is seen for a post-operatively following  laparoscopic appendectomy per Dr. Nina.     Patient is doing well. Eating well with minimal nausea. Having good bowel function. No problems with constipation or diarrhea. No urinary complaints. Denies fever. Ambulating well and slowly returning to normal activities.    PATHOLOGY:       PHYSICAL EXAM:    ABD: Incisions are healing well without any erythema or signs of infection. Abdomen is soft and non distended.      ASSESSMENT    Diagnoses and all orders for this visit:    1. S/P appendectomy (Primary)        PLAN:  1. The patient will follow-up on a prn basis unless there are any problems.  2. May shower.   3. Gradually return to normal activity without restrictions                      This document has been electronically signed by ROLANDO Lamas on August 2, 2021 10:58 CDT

## 2021-10-11 ENCOUNTER — IMMUNIZATION (OUTPATIENT)
Dept: VACCINE CLINIC | Facility: HOSPITAL | Age: 51
End: 2021-10-11

## 2021-10-11 PROCEDURE — 91300 HC SARSCOV02 VAC 30MCG/0.3ML IM: CPT | Performed by: SURGERY

## 2021-10-11 PROCEDURE — 0004A ADM SARSCOV2 30MCG/0.3ML BOOSTER: CPT | Performed by: SURGERY

## 2021-10-11 PROCEDURE — 0003A: CPT | Performed by: SURGERY

## 2021-10-25 ENCOUNTER — APPOINTMENT (OUTPATIENT)
Dept: VACCINE CLINIC | Facility: HOSPITAL | Age: 51
End: 2021-10-25

## 2021-11-05 RX ORDER — LEVOTHYROXINE SODIUM 88 UG/1
TABLET ORAL
Qty: 90 TABLET | Refills: 3 | Status: SHIPPED | OUTPATIENT
Start: 2021-11-05 | End: 2022-10-25

## 2021-11-11 ENCOUNTER — LAB (OUTPATIENT)
Dept: LAB | Facility: HOSPITAL | Age: 51
End: 2021-11-11

## 2021-11-11 DIAGNOSIS — E03.9 ACQUIRED HYPOTHYROIDISM: Chronic | ICD-10-CM

## 2021-11-11 DIAGNOSIS — E55.9 VITAMIN D DEFICIENCY: ICD-10-CM

## 2021-11-11 DIAGNOSIS — Z00.00 ANNUAL PHYSICAL EXAM: ICD-10-CM

## 2021-11-11 LAB
25(OH)D3 SERPL-MCNC: 83.7 NG/ML
ALBUMIN SERPL-MCNC: 4.7 G/DL (ref 3.5–5.2)
ALBUMIN/GLOB SERPL: 1.7 G/DL
ALP SERPL-CCNC: 61 U/L (ref 39–117)
ALT SERPL W P-5'-P-CCNC: 15 U/L (ref 1–33)
ANION GAP SERPL CALCULATED.3IONS-SCNC: 7.7 MMOL/L (ref 5–15)
AST SERPL-CCNC: 18 U/L (ref 1–32)
BILIRUB SERPL-MCNC: 0.5 MG/DL (ref 0–1.2)
BUN SERPL-MCNC: 14 MG/DL (ref 6–20)
BUN/CREAT SERPL: 19.7 (ref 7–25)
CALCIUM SPEC-SCNC: 9.6 MG/DL (ref 8.6–10.5)
CHLORIDE SERPL-SCNC: 100 MMOL/L (ref 98–107)
CHOLEST SERPL-MCNC: 216 MG/DL (ref 0–200)
CO2 SERPL-SCNC: 27.3 MMOL/L (ref 22–29)
CREAT SERPL-MCNC: 0.71 MG/DL (ref 0.57–1)
GFR SERPL CREATININE-BSD FRML MDRD: 87 ML/MIN/1.73
GLOBULIN UR ELPH-MCNC: 2.8 GM/DL
GLUCOSE SERPL-MCNC: 90 MG/DL (ref 65–99)
HDLC SERPL-MCNC: 90 MG/DL (ref 40–60)
LDLC SERPL CALC-MCNC: 117 MG/DL (ref 0–100)
LDLC/HDLC SERPL: 1.28 {RATIO}
POTASSIUM SERPL-SCNC: 4.6 MMOL/L (ref 3.5–5.2)
PROT SERPL-MCNC: 7.5 G/DL (ref 6–8.5)
SODIUM SERPL-SCNC: 135 MMOL/L (ref 136–145)
TRIGL SERPL-MCNC: 52 MG/DL (ref 0–150)
TSH SERPL DL<=0.05 MIU/L-ACNC: 0.52 UIU/ML (ref 0.27–4.2)
VLDLC SERPL-MCNC: 9 MG/DL (ref 5–40)

## 2021-11-11 PROCEDURE — 80053 COMPREHEN METABOLIC PANEL: CPT

## 2021-11-11 PROCEDURE — 84443 ASSAY THYROID STIM HORMONE: CPT

## 2021-11-11 PROCEDURE — 36415 COLL VENOUS BLD VENIPUNCTURE: CPT

## 2021-11-11 PROCEDURE — 80061 LIPID PANEL: CPT

## 2021-11-11 PROCEDURE — 82306 VITAMIN D 25 HYDROXY: CPT

## 2021-11-15 ENCOUNTER — OFFICE VISIT (OUTPATIENT)
Dept: FAMILY MEDICINE CLINIC | Facility: CLINIC | Age: 51
End: 2021-11-15

## 2021-11-15 VITALS
OXYGEN SATURATION: 100 % | DIASTOLIC BLOOD PRESSURE: 60 MMHG | HEIGHT: 65 IN | HEART RATE: 58 BPM | BODY MASS INDEX: 23.32 KG/M2 | WEIGHT: 140 LBS | SYSTOLIC BLOOD PRESSURE: 104 MMHG

## 2021-11-15 DIAGNOSIS — Z00.00 ANNUAL PHYSICAL EXAM: Primary | ICD-10-CM

## 2021-11-15 DIAGNOSIS — K63.9 BOWEL WALL THICKENING: ICD-10-CM

## 2021-11-15 PROCEDURE — 99396 PREV VISIT EST AGE 40-64: CPT | Performed by: GENERAL PRACTICE

## 2021-11-15 RX ORDER — VALACYCLOVIR HYDROCHLORIDE 1 G/1
1 TABLET, FILM COATED ORAL AS NEEDED
COMMUNITY
Start: 2021-09-20 | End: 2022-08-22 | Stop reason: SDUPTHER

## 2021-11-15 NOTE — PROGRESS NOTES
"Subjective   Vanessa Beach is a 51 y.o. female.     Chief Complaint   Patient presents with   • Annual Exam     zita       History of Present Illness     For annual wellness exam. Records reviewed. Recent labs, xrays reviewed and medications reconciled. Due for mammogram.  Had her appendix out in July.  CT scan at that time showed some focal bowel wall thickening at the splenic flexure and repeat CT was recommended.  She has no other related symptoms.  Did have a normal colonoscopy in March.    The following portions of the patient's history were reviewed and updated as appropriate: allergies, current medications, past family and social history and problem list.    Outpatient Medications Prior to Visit   Medication Sig Dispense Refill   • ACETAMINOPHEN PO Take 500 mg by mouth Every 4 (Four) Hours As Needed.     • Cholecalciferol 1000 UNITS tablet Take 1,000 Units by mouth daily.     • levothyroxine (SYNTHROID, LEVOTHROID) 88 MCG tablet TAKE ONE TABLET BY MOUTH DAILY 90 tablet 3   • magnesium oxide (MAG-OX) 400 MG tablet Take 400 mg by mouth Daily.     • omeprazole (priLOSEC) 20 MG capsule Take 20 mg by mouth Daily. prn     • valACYclovir (VALTREX) 1000 MG tablet Take 1 tablet by mouth As Needed.     • acyclovir (ZOVIRAX) 400 MG tablet Take 400 mg by mouth 2 (Two) Times a Day.     • ondansetron (Zofran) 8 MG tablet Take 1 tablet by mouth Every 8 (Eight) Hours As Needed for Nausea or Vomiting. 20 tablet 0   • promethazine (PHENERGAN) 12.5 MG tablet Take 1 tablet by mouth Every 6 (Six) Hours As Needed for Nausea or Vomiting. 15 tablet 0     No facility-administered medications prior to visit.       Review of Systems  I have reviewed 12 systems with patient. Findings were negative except what is noted below and/or in history of present illness.    Objective     Visit Vitals  /60   Pulse 58   Ht 165.1 cm (65\")   Wt 63.5 kg (140 lb)   LMP  (LMP Unknown)   SpO2 100%   BMI 23.30 kg/m²     Physical Exam  Vitals and " nursing note reviewed.   Constitutional:       General: She is not in acute distress.     Appearance: She is well-developed.   HENT:      Head: Normocephalic and atraumatic.      Nose: Nose normal.   Eyes:      General:         Right eye: No discharge.         Left eye: No discharge.      Conjunctiva/sclera: Conjunctivae normal.      Pupils: Pupils are equal, round, and reactive to light.   Neck:      Thyroid: No thyromegaly.      Trachea: No tracheal deviation.   Cardiovascular:      Rate and Rhythm: Normal rate and regular rhythm.      Heart sounds: Normal heart sounds. No murmur heard.      Pulmonary:      Effort: Pulmonary effort is normal. No respiratory distress.      Breath sounds: Normal breath sounds. No wheezing or rales.   Chest:      Chest wall: No tenderness.   Breasts:      Right: No inverted nipple, mass, nipple discharge, skin change or tenderness.      Left: No inverted nipple, mass, nipple discharge, skin change or tenderness.       Abdominal:      General: Bowel sounds are normal. There is no distension.      Palpations: Abdomen is soft. There is no mass.      Tenderness: There is no abdominal tenderness.      Hernia: No hernia is present.   Musculoskeletal:         General: No deformity. Normal range of motion.   Lymphadenopathy:      Cervical: No cervical adenopathy.   Skin:     General: Skin is warm and dry.   Neurological:      Mental Status: She is alert and oriented to person, place, and time.      Deep Tendon Reflexes: Reflexes are normal and symmetric.   Psychiatric:         Behavior: Behavior normal.         Thought Content: Thought content normal.         Judgment: Judgment normal.       Results for orders placed or performed in visit on 11/11/21   Comprehensive Metabolic Panel    Specimen: Blood   Result Value Ref Range    Glucose 90 65 - 99 mg/dL    BUN 14 6 - 20 mg/dL    Creatinine 0.71 0.57 - 1.00 mg/dL    Sodium 135 (L) 136 - 145 mmol/L    Potassium 4.6 3.5 - 5.2 mmol/L    Chloride  100 98 - 107 mmol/L    CO2 27.3 22.0 - 29.0 mmol/L    Calcium 9.6 8.6 - 10.5 mg/dL    Total Protein 7.5 6.0 - 8.5 g/dL    Albumin 4.70 3.50 - 5.20 g/dL    ALT (SGPT) 15 1 - 33 U/L    AST (SGOT) 18 1 - 32 U/L    Alkaline Phosphatase 61 39 - 117 U/L    Total Bilirubin 0.5 0.0 - 1.2 mg/dL    eGFR Non African Amer 87 >60 mL/min/1.73    Globulin 2.8 gm/dL    A/G Ratio 1.7 g/dL    BUN/Creatinine Ratio 19.7 7.0 - 25.0    Anion Gap 7.7 5.0 - 15.0 mmol/L   Vitamin D 25 Hydroxy    Specimen: Blood   Result Value Ref Range    25 Hydroxy, Vitamin D 83.7 ng/ml   Lipid Panel    Specimen: Blood   Result Value Ref Range    Total Cholesterol 216 (H) 0 - 200 mg/dL    Triglycerides 52 0 - 150 mg/dL    HDL Cholesterol 90 (H) 40 - 60 mg/dL    LDL Cholesterol  117 (H) 0 - 100 mg/dL    VLDL Cholesterol 9 5 - 40 mg/dL    LDL/HDL Ratio 1.28    TSH    Specimen: Blood   Result Value Ref Range    TSH 0.521 0.270 - 4.200 uIU/mL      Notes brought forward are reviewed and updated if indicated.     Assessment/Plan   Problems Addressed this Visit     None      Visit Diagnoses     Annual physical exam    -  Primary      Diagnoses       Codes Comments    Annual physical exam    -  Primary ICD-10-CM: Z00.00  ICD-9-CM: V70.0          Will notify regarding results.     Age-appropriate counseling is provided.      No orders of the defined types were placed in this encounter.    Return in about 1 year (around 11/15/2022) for Annual physical.        This document has been electronically signed by Meghana Connolly MD on November 15, 2021 14:33 CST

## 2021-12-14 ENCOUNTER — HOSPITAL ENCOUNTER (OUTPATIENT)
Dept: CT IMAGING | Facility: HOSPITAL | Age: 51
Discharge: HOME | End: 2021-12-14
Admitting: GENERAL PRACTICE

## 2021-12-14 DIAGNOSIS — K63.9 BOWEL WALL THICKENING: ICD-10-CM

## 2021-12-14 PROCEDURE — 74177 CT ABD & PELVIS W/CONTRAST: CPT

## 2021-12-14 PROCEDURE — 25010000002 IOPAMIDOL 61 % SOLUTION: Performed by: GENERAL PRACTICE

## 2021-12-14 RX ADMIN — IOPAMIDOL 90 ML: 612 INJECTION, SOLUTION INTRAVENOUS at 09:53

## 2021-12-15 ENCOUNTER — TELEPHONE (OUTPATIENT)
Dept: FAMILY MEDICINE CLINIC | Facility: CLINIC | Age: 51
End: 2021-12-15

## 2021-12-15 NOTE — TELEPHONE ENCOUNTER
Per Dr. Connolly, Ms. Beach has been called with recent Abdominal & Pelvic CT results & recommendations.  Continue current medications and follow-up as planned or sooner if any problems.       ----- Message from Meghana Connolly MD sent at 12/15/2021  8:57 AM CST -----  Call and tell normal

## 2021-12-15 NOTE — PROGRESS NOTES
Per Dr. Connolly, Ms. Beach has been called with recent Abdominal & Pelvic CT results & recommendations.  Continue current medications and follow-up as planned or sooner if any problems.

## 2022-06-21 ENCOUNTER — TELEPHONE (OUTPATIENT)
Dept: FAMILY MEDICINE CLINIC | Facility: CLINIC | Age: 52
End: 2022-06-21

## 2022-06-21 DIAGNOSIS — M79.605 LEFT LEG PAIN: ICD-10-CM

## 2022-06-21 DIAGNOSIS — M25.552 LEFT HIP PAIN: Primary | ICD-10-CM

## 2022-06-21 NOTE — TELEPHONE ENCOUNTER
PATIENT CALLED WANTING TO KNOW IF A REFERRAL FOR PHYSICAL THERAPY CAN BE SENT FOR HER LEFT LEG/HIP. SHE SAID IT IS AN OLD INJURY THAT SHE HAS HAD THERAPY ON BEFORE.    THANKS

## 2022-06-22 ENCOUNTER — HOSPITAL ENCOUNTER (OUTPATIENT)
Dept: PHYSICAL THERAPY | Facility: HOSPITAL | Age: 52
Setting detail: THERAPIES SERIES
Discharge: HOME | End: 2022-06-22

## 2022-06-22 DIAGNOSIS — M79.605 LEFT LEG PAIN: ICD-10-CM

## 2022-06-22 DIAGNOSIS — M25.552 LEFT HIP PAIN: Primary | ICD-10-CM

## 2022-06-22 PROCEDURE — 97161 PT EVAL LOW COMPLEX 20 MIN: CPT | Performed by: PHYSICAL THERAPIST

## 2022-06-22 NOTE — THERAPY EVALUATION
Outpatient Physical Therapy Ortho Initial Evaluation  [facility]     Patient Name: Vanessa Beach  : 1970  MRN: [id number]  Today's Date: 2022      Visit Date: 2022    Patient Active Problem List   Diagnosis   • Plantar fasciitis   • Acquired hypothyroidism   • Soft tissue mass        Past Medical History:   Diagnosis Date   • Abnormal mammography    • Acquired hypothyroidism    • Acute bronchitis    • Acute otitis externa    • Allergic reaction     Allergic reaction to substance - Poison IVY      • Alopecia    • Aphthous ulcer of mouth    • Contact dermatitis due to plants, except food    • Cough    • Fever    • GERD (gastroesophageal reflux disease)    • Herpes zoster    • PONV (postoperative nausea and vomiting)    • Presbyopia    • Upper respiratory infection    • Vitamin D deficiency    • Wheezing         Past Surgical History:   Procedure Laterality Date   • APPENDECTOMY N/A 2021    Procedure: LAPAROSCOPIC  APPENDECTOMY;  Surgeon: Satnam Nina MD;  Location: [facility] OR;  Service: General;  Laterality: N/A;   • COLONOSCOPY N/A 3/12/2021    Procedure: COLONOSCOPY;  Surgeon: Ryan Baker DO;  Location: [facility] ENDOSCOPY;  Service: Gastroenterology;  Laterality: N/A;   • HYSTERECTOMY     • INJECTION OF MEDICATION  2014    KENALOG(2)   • LAPAROSCOPIC TUBAL LIGATION     • MASS EXCISION Right 2018    Procedure: EXCISE SOFT TISSUE MASS RIGHT BUTTOCK       (ANESTHESIA REQUEST YUEN);  Surgeon: Miguelito Galvan MD;  Location: [facility] OR;  Service: General   • THYROIDECTOMY, PARTIAL  2000       Visit Dx:     ICD-10-CM ICD-9-CM   1. Left hip pain  M25.552 719.45   2. Left leg pain  M79.605 729.5          Patient History     Row Name 22 0800             History    Brief Description of Current Complaint Pain at rest and pain with sitting on leg. Pressure on knee makes calf hurt. Notes pain is starting to hurt in foot.  -BB      Hand Dominance right-handed   -BB      Occupation/sports/leisure activities [employer]  -BB            User Key  (r) = Recorded By, (t) = Taken By, (c) = Cosigned By    Initials Name Provider Type    Vicky Gonzalez PT DPT Physical Therapist                 PT Ortho     Row Name 06/22/22 0800       Subjective Comments    Subjective Comments see pt hx  -BB       Precautions and Contraindications    Precautions/Limitations no known precautions/limitations  -BB       Subjective Pain    Post-Treatment Pain Level --  sore  -BB       Posture/Observations    Posture/Observations Comments left posterior rotation of SI. No acute distress. No significant antalgic gait. Left greater than right ankle valgus with overpronation  -BB       Special Tests/Palpation    Special Tests/Palpation Hip;Knee  -BB       Hip/Thigh Palpation    Hip/Thigh Palpation? Yes  ERs mild ttp  -BB    Semimembranosis Guarded/taut  -BB    ITB Guarded/taut  -BB       Knee Palpation    Knee Palpation? Yes  ttp soleus and invertors  -BB    Medial Gastroc Head Tender  -BB       General ROM    GENERAL ROM COMMENTS hip ER/IR grossly in tact. Knee and ankle WFL  -BB       MMT (Manual Muscle Testing)    General MMT Comments hip abd: 4-/5, hip flex: 4-/5, hip ext:4-/5, her ER: 4-/5  -BB       Sensation    Sensation WNL? WNL  -BB       Gait/Stairs (Locomotion)    Comment, (Gait/Stairs) mild left trendelenburg in stance phase,  -BB          User Key  (r) = Recorded By, (t) = Taken By, (c) = Cosigned By    Initials Name Provider Type    Vicky Gonzalez PT DPT Physical Therapist                                   PT OP Goals     Row Name 06/22/22 0800          Long Term Goals    LTG Date to Achieve 08/03/22  -BB     LTG 1 No increased pain kneeling on left knee  -BB     LTG 2 hip abduction strength 4+/5  -BB     LTG 3 decrease ttp of soleus  -BB            Time Calculation    PT Goal Re-Cert Due Date 07/13/22  -BB           User Key  (r) = Recorded By, (t) = Taken By, (c) = Cosigned By     Initials Name Provider Type    Vicky Gonzalez PT DPT Physical Therapist                 PT Assessment/Plan     Row Name 06/22/22 0800          PT Assessment    Functional Limitations Impaired gait;Performance in leisure activities;Limitations in functional capacity and performance  -BB     Impairments Gait;Pain;Muscle strength;Poor body mechanics;Impaired muscle endurance  -BB     Assessment Comments PAtient presents today with L>R ankle valgus and overpronation, decreased functional strength of the hip and noted taut bands of the soleus and gastroc. Noted to also have a SI rotation improved with MET this date. Also noted to have taut bands with strong twitches noted with dry needling this date. Cast molded for shocker plus orthotics for improved motion control of subtalar joint in stance and gait. PAtient could continue to benefit from PT To improve tissue mobility, functional strength, and decrease pain.  -BB     Rehab Potential Good  -BB     Patient/caregiver participated in establishment of treatment plan and goals Yes  -BB     Patient would benefit from skilled therapy intervention Yes  -BB            PT Plan    PT Frequency 1x/week;2x/week  -BB     Predicted Duration of Therapy Intervention (PT) 4-6 weeks  -BB     Planned CPT's? PT EVAL LOW COMPLEXITY: 14107;PT RE-EVAL: 79045;PT THER PROC EA 15 MIN: 29468;PT THER ACT EA 15 MIN: 22390;PT MANUAL THERAPY EA 15 MIN: 08781;PT NEUROMUSC RE-EDUCATION EA 15 MIN: 97655;PT GAIT TRAINING EA 15 MIN: 19381;PT SELF CARE/HOME MGMT/TRAIN EA 15: 33408;PT THER SUPP EA 15 MIN  -BB     PT Plan Comments custom orthotics, stretching, strength, gait, manual therapy, endurance, gait  -BB           User Key  (r) = Recorded By, (t) = Taken By, (c) = Cosigned By    Initials Name Provider Type    Vicky Gonzalez PT DPT Physical Therapist                   OP Exercises     Row Name 06/22/22 0800             Subjective Comments    Subjective Comments see pt hx  -BB               Subjective Pain    Able to rate subjective pain? yes  -BB      Pre-Treatment Pain Level 3  -BB      Post-Treatment Pain Level --  sore  -BB              Exercise 1    Exercise Name 1 eval/poc  -BB              Exercise 2    Exercise Name 2 dry needling to medial gastroc/ soleus  -BB      Additional Comments twitches noted  -BB              Exercise 3    Exercise Name 3 prone cast mold  -BB            User Key  (r) = Recorded By, (t) = Taken By, (c) = Cosigned By    Initials Name Provider Type    BB Vicky Pickering PT DPT Physical Therapist                                        Time Calculation:     Start Time: 0800  Stop Time: 0845  Time Calculation (min): 45 min     Therapy Charges for Today     Code Description Service Date Service Provider Modifiers Qty    83605962795 HC PT-CUSTOM ORTHOTICS-LEVEL 2 6/22/2022 Vicky Pickering PT DPT  1    27226125540 HC PT EVAL LOW COMPLEXITY 2 6/22/2022 Vicky Pickering PT DPT GP 1                    Vicky Pickering PT BOO  6/22/2022

## 2022-06-28 DIAGNOSIS — Z12.31 ENCOUNTER FOR SCREENING MAMMOGRAM FOR BREAST CANCER: Primary | ICD-10-CM

## 2022-06-29 ENCOUNTER — HOSPITAL ENCOUNTER (OUTPATIENT)
Dept: PHYSICAL THERAPY | Facility: HOSPITAL | Age: 52
Setting detail: THERAPIES SERIES
Discharge: HOME | End: 2022-06-29

## 2022-06-29 DIAGNOSIS — M79.605 LEFT LEG PAIN: ICD-10-CM

## 2022-06-29 DIAGNOSIS — M25.552 LEFT HIP PAIN: Primary | ICD-10-CM

## 2022-06-29 PROCEDURE — 97140 MANUAL THERAPY 1/> REGIONS: CPT | Performed by: PHYSICAL THERAPIST

## 2022-06-29 NOTE — THERAPY TREATMENT NOTE
Outpatient Physical Therapy Ortho Treatment Note  [facility]     Patient Name: Vanessa Beach  : 1970  MRN: [id number]  Today's Date: 2022      Visit Date: 2022    Visit Dx:    ICD-10-CM ICD-9-CM   1. Left hip pain  M25.552 719.45   2. Left leg pain  M79.605 729.5       Patient Active Problem List   Diagnosis   • Plantar fasciitis   • Acquired hypothyroidism   • Soft tissue mass        Past Medical History:   Diagnosis Date   • Abnormal mammography    • Acquired hypothyroidism    • Acute bronchitis    • Acute otitis externa    • Allergic reaction     Allergic reaction to substance - Poison IVY      • Alopecia    • Aphthous ulcer of mouth    • Contact dermatitis due to plants, except food    • Cough    • Fever    • GERD (gastroesophageal reflux disease)    • Herpes zoster    • PONV (postoperative nausea and vomiting)    • Presbyopia    • Upper respiratory infection    • Vitamin D deficiency    • Wheezing         Past Surgical History:   Procedure Laterality Date   • APPENDECTOMY N/A 2021    Procedure: LAPAROSCOPIC  APPENDECTOMY;  Surgeon: Satnam Nina MD;  Location: [facility] OR;  Service: General;  Laterality: N/A;   • COLONOSCOPY N/A 3/12/2021    Procedure: COLONOSCOPY;  Surgeon: Ryan Baker DO;  Location: [facility] ENDOSCOPY;  Service: Gastroenterology;  Laterality: N/A;   • HYSTERECTOMY     • INJECTION OF MEDICATION  2014    KENALOG(2)   • LAPAROSCOPIC TUBAL LIGATION     • MASS EXCISION Right 2018    Procedure: EXCISE SOFT TISSUE MASS RIGHT BUTTOCK       (ANESTHESIA REQUEST YUEN);  Surgeon: Miguelito Galvan MD;  Location: [facility];  Service: General   • THYROIDECTOMY, PARTIAL  2000        PT Ortho     Row Name 22 0800       Subjective Comments    Subjective Comments Has been running, sitting is most painful for calf. Dry needling helped some. Bought new running shoes.  -BB       Precautions and Contraindications    Precautions/Limitations no known  "precautions/limitations  -BB       Subjective Pain    Able to rate subjective pain? yes  -BB    Post-Treatment Pain Level --  \"feels better\"  -BB       Posture/Observations    Posture/Observations Comments taut medial and lateral gastroc bands, tender of soleus mm belly  -BB          User Key  (r) = Recorded By, (t) = Taken By, (c) = Cosigned By    Initials Name Provider Type    Vicky Gonzalez, PT DPT Physical Therapist                             PT Assessment/Plan     Row Name 06/29/22 0800          PT Assessment    Assessment Comments Good tolerance to manual treatment. Taut bands noted of gastroc complex with tendnerness.  -BB            PT Plan    PT Frequency 1x/week;2x/week  -BB     Predicted Duration of Therapy Intervention (PT) 4-6 weeks  -BB     PT Plan Comments OOT next week.- return to manual treatment when returns. Encouraged to stretch/roll  -BB           User Key  (r) = Recorded By, (t) = Taken By, (c) = Cosigned By    Initials Name Provider Type    Vicky Gonzalez, PT DPT Physical Therapist                   OP Exercises     Row Name 06/29/22 0800             Subjective Comments    Subjective Comments Has been running, sitting is most painful for calf. Dry needling helped some. Bought new running shoes.  -BB              Subjective Pain    Able to rate subjective pain? yes  -BB      Post-Treatment Pain Level --  \"feels better\"  -BB              Exercise 1    Exercise Name 1 MFR/IAMT with free up  -BB      Time 1 25'  -BB              Exercise 2    Exercise Name 2 demonstration of clamshells, hip abd and hip flex with tband in abd for HEP  -BB      Sets 2 1  -BB      Reps 2 5-10 reps each  -BB            User Key  (r) = Recorded By, (t) = Taken By, (c) = Cosigned By    Initials Name Provider Type    Vicky Gonzalez, PT DPT Physical Therapist                              PT OP Goals     Row Name 06/29/22 0800          Long Term Goals    LTG Date to Achieve 08/03/22  -BB     LTG 1 No increased " pain kneeling on left knee  -BB     LTG 1 Progress Not Met  -BB     LTG 2 hip abduction strength 4+/5  -BB     LTG 2 Progress Not Met  -BB     LTG 3 decrease ttp of soleus  -BB     LTG 3 Progress Not Met  -BB            Time Calculation    PT Goal Re-Cert Due Date 07/13/22  -BB           User Key  (r) = Recorded By, (t) = Taken By, (c) = Cosigned By    Initials Name Provider Type    Vicky Gonzalez, PT DPT Physical Therapist                               Time Calculation:   Start Time: 0800  Stop Time: 0829  Time Calculation (min): 29 min  Therapy Charges for Today     Code Description Service Date Service Provider Modifiers Qty    29919590289 HC PT MANUAL THERAPY EA 15 MIN 6/29/2022 Vicky Pickering, PT DPT GP 2                    Vicky Pickering PT DPT  6/29/2022

## 2022-07-06 ENCOUNTER — APPOINTMENT (OUTPATIENT)
Dept: PHYSICAL THERAPY | Facility: HOSPITAL | Age: 52
End: 2022-07-06

## 2022-07-11 ENCOUNTER — HOSPITAL ENCOUNTER (OUTPATIENT)
Dept: PHYSICAL THERAPY | Facility: HOSPITAL | Age: 52
Setting detail: THERAPIES SERIES
Discharge: HOME | End: 2022-07-11

## 2022-07-11 DIAGNOSIS — M79.605 LEFT LEG PAIN: ICD-10-CM

## 2022-07-11 DIAGNOSIS — M25.552 LEFT HIP PAIN: Primary | ICD-10-CM

## 2022-07-11 PROCEDURE — 97140 MANUAL THERAPY 1/> REGIONS: CPT | Performed by: PHYSICAL THERAPIST

## 2022-07-11 NOTE — THERAPY PROGRESS REPORT/RE-CERT
Outpatient Physical Therapy Ortho Progress Note  [facility]     Patient Name: Vanessa Beach  : 1970  MRN: [id number]  Today's Date: 2022      Visit Date: 2022    Visit Dx:    ICD-10-CM ICD-9-CM   1. Left hip pain  M25.552 719.45   2. Left leg pain  M79.605 729.5       Patient Active Problem List   Diagnosis   • Plantar fasciitis   • Acquired hypothyroidism   • Soft tissue mass        Past Medical History:   Diagnosis Date   • Abnormal mammography    • Acquired hypothyroidism    • Acute bronchitis    • Acute otitis externa    • Allergic reaction     Allergic reaction to substance - Poison IVY      • Alopecia    • Aphthous ulcer of mouth    • Contact dermatitis due to plants, except food    • Cough    • Fever    • GERD (gastroesophageal reflux disease)    • Herpes zoster    • PONV (postoperative nausea and vomiting)    • Presbyopia    • Upper respiratory infection    • Vitamin D deficiency    • Wheezing         Past Surgical History:   Procedure Laterality Date   • APPENDECTOMY N/A 2021    Procedure: LAPAROSCOPIC  APPENDECTOMY;  Surgeon: Satnam Nina MD;  Location: [facility] OR;  Service: General;  Laterality: N/A;   • COLONOSCOPY N/A 3/12/2021    Procedure: COLONOSCOPY;  Surgeon: Ryan Baker DO;  Location: [facility] ENDOSCOPY;  Service: Gastroenterology;  Laterality: N/A;   • HYSTERECTOMY     • INJECTION OF MEDICATION  2014    KENALOG(2)   • LAPAROSCOPIC TUBAL LIGATION     • MASS EXCISION Right 2018    Procedure: EXCISE SOFT TISSUE MASS RIGHT BUTTOCK       (ANESTHESIA REQUEST YUEN);  Surgeon: Miguelito Galvan MD;  Location: [facility];  Service: General   • THYROIDECTOMY, PARTIAL  2000        PT Ortho     Row Name 22 0800       Subjective Comments    Subjective Comments Reports sitting with knee bent is painful. Car ride to [place] had back pain. Running aided in symptoms. Pain goes to foot and in calf.  -BB       Precautions and Contraindications     Precautions/Limitations no known precautions/limitations  -BB       Subjective Pain    Able to rate subjective pain? yes  -BB    Subjective Pain Comment Its still there  -BB       Posture/Observations    Posture/Observations Comments Left posterior rotation and up slip of SI with slight trunk lean seen  -BB       Special Tests/Palpation    Special Tests/Palpation --  negative neural tension  -BB       Hip/Thigh Palpation    Gluteus Medius Tender  -BB    Piriformis Tender  -BB    SI Tender  -BB       Knee Palpation    Knee Palpation? --  no significant ttp today  -BB       General ROM    GENERAL ROM COMMENTS Hip is WNL, knee is WNL, left SB with increased pain in left low back. rigtht SB is WNL and trunk extension WNL  -BB       MMT (Manual Muscle Testing)    General MMT Comments Not tested this date  -BB       Sensation    Sensation WNL? WNL  -BB          User Key  (r) = Recorded By, (t) = Taken By, (c) = Cosigned By    Initials Name Provider Type    Vicky Gonzalez, PT DPT Physical Therapist                             PT Assessment/Plan     Row Name 07/11/22 0800          PT Assessment    Functional Limitations Impaired gait  -BB     Impairments Gait;Pain;Muscle strength;Poor body mechanics;Impaired muscle endurance  -BB     Assessment Comments Continues to have pain in calf and foot, treatment focused on low back with possible radicular symptoms as culprit due to hx prior. No significant gastroc deficits today, is noted to have tenderness of piriformis and a SI rotation improved with manual treatment.  -BB     Rehab Potential Good  -BB     Patient/caregiver participated in establishment of treatment plan and goals Yes  -BB     Patient would benefit from skilled therapy intervention Yes  -BB            PT Plan    PT Frequency 1x/week;2x/week  -BB     Predicted Duration of Therapy Intervention (PT) 4-6 weeks  -BB     PT Plan Comments Monitor response to lumbar/SI tx. continue manual, stretching, strength, monitor  SI,  -BB           User Key  (r) = Recorded By, (t) = Taken By, (c) = Cosigned By    Initials Name Provider Type    BB Vicky Pickering, PT DPT Physical Therapist                   OP Exercises     Row Name 07/11/22 0800             Subjective Comments    Subjective Comments Reports sitting with knee bent is painful. Car ride to [place] had back pain. Running aided in symptoms. Pain goes to foot and in calf.  -BB              Subjective Pain    Able to rate subjective pain? yes  -BB      Subjective Pain Comment Its still there  -BB              Exercise 1    Exercise Name 1 recheck  -BB              Exercise 2    Exercise Name 2 SL SI correction and illium stretch and SI gapping/rocking  -BB      Time 2 8'  -BB              Exercise 3    Exercise Name 3 trp/cross fx/MFR to glutes, piriformis, QL  -BB      Time 3 20'  -BB      Additional Comments ttp of L1 TP left sie=de  -BB              Exercise 4    Exercise Name 4 Long arm distraction and left inferior pull  -BB      Time 4 5'  -BB              Exercise 5    Exercise Name 5 prone grade 2-3 jt mobes lumbar spine and sacrum  -BB      Time 5 4'  -BB            User Key  (r) = Recorded By, (t) = Taken By, (c) = Cosigned By    Initials Name Provider Type    Vicky Gonzalez, PT DPT Physical Therapist                              PT OP Goals     Row Name 07/11/22 0800          Long Term Goals    LTG Date to Achieve 08/03/22  -BB     LTG 1 No increased pain kneeling on left knee  -BB     LTG 1 Progress Not Met  -BB     LTG 2 hip abduction strength 4+/5  -BB     LTG 2 Progress Not Met  -BB     LTG 3 decrease ttp of soleus  -BB     LTG 3 Progress Not Met  -BB            Time Calculation    PT Goal Re-Cert Due Date 08/01/22  -BB           User Key  (r) = Recorded By, (t) = Taken By, (c) = Cosigned By    Initials Name Provider Type    BB Vicky Pickering, PT DPT Physical Therapist                               Time Calculation:   Start Time: 0800  Stop Time: 0840  Time  Calculation (min): 40 min  Therapy Charges for Today     Code Description Service Date Service Provider Modifiers Qty    74109663602 HC PT MANUAL THERAPY EA 15 MIN 7/11/2022 Vicky Pickering, PT DPT GP 3                    Vicky Pickering, PT DPT  7/11/2022

## 2022-07-13 ENCOUNTER — APPOINTMENT (OUTPATIENT)
Dept: PHYSICAL THERAPY | Facility: HOSPITAL | Age: 52
End: 2022-07-13

## 2022-07-18 ENCOUNTER — HOSPITAL ENCOUNTER (OUTPATIENT)
Dept: PHYSICAL THERAPY | Facility: HOSPITAL | Age: 52
Setting detail: THERAPIES SERIES
Discharge: HOME | End: 2022-07-18

## 2022-07-18 DIAGNOSIS — M79.605 LEFT LEG PAIN: ICD-10-CM

## 2022-07-18 DIAGNOSIS — M25.552 LEFT HIP PAIN: Primary | ICD-10-CM

## 2022-07-18 PROCEDURE — 97140 MANUAL THERAPY 1/> REGIONS: CPT | Performed by: PHYSICAL THERAPIST

## 2022-07-18 NOTE — THERAPY TREATMENT NOTE
Outpatient Physical Therapy Ortho Treatment Note  [facility]     Patient Name: Vanessa Beach  : 1970  MRN: [id number]  Today's Date: 2022      Visit Date: 2022    Visit Dx:    ICD-10-CM ICD-9-CM   1. Left hip pain  M25.552 719.45   2. Left leg pain  M79.605 729.5       Patient Active Problem List   Diagnosis   • Plantar fasciitis   • Acquired hypothyroidism   • Soft tissue mass        Past Medical History:   Diagnosis Date   • Abnormal mammography    • Acquired hypothyroidism    • Acute bronchitis    • Acute otitis externa    • Allergic reaction     Allergic reaction to substance - Poison IVY      • Alopecia    • Aphthous ulcer of mouth    • Contact dermatitis due to plants, except food    • Cough    • Fever    • GERD (gastroesophageal reflux disease)    • Herpes zoster    • PONV (postoperative nausea and vomiting)    • Presbyopia    • Upper respiratory infection    • Vitamin D deficiency    • Wheezing         Past Surgical History:   Procedure Laterality Date   • APPENDECTOMY N/A 2021    Procedure: LAPAROSCOPIC  APPENDECTOMY;  Surgeon: Satnam Nina MD;  Location: [facility] OR;  Service: General;  Laterality: N/A;   • COLONOSCOPY N/A 3/12/2021    Procedure: COLONOSCOPY;  Surgeon: Ryan Baker DO;  Location: [facility] ENDOSCOPY;  Service: Gastroenterology;  Laterality: N/A;   • HYSTERECTOMY     • INJECTION OF MEDICATION  2014    KENALOG(2)   • LAPAROSCOPIC TUBAL LIGATION     • MASS EXCISION Right 2018    Procedure: EXCISE SOFT TISSUE MASS RIGHT BUTTOCK       (ANESTHESIA REQUEST YUEN);  Surgeon: Miguelito Galvan MD;  Location: [facility] OR;  Service: General   • THYROIDECTOMY, PARTIAL  2000        PT Ortho     Row Name 22 0817       Subjective Comments    Subjective Comments Reports pain down leg and calf and foot. unable to walk and run without pain increasing immediately  -BB       Precautions and Contraindications    Precautions/Limitations no known  precautions/limitations  -BB       Subjective Pain    Able to rate subjective pain? yes  -BB    Pre-Treatment Pain Level 5  -BB    Post-Treatment Pain Level 5  -BB       Posture/Observations    Posture/Observations Comments Taut and tender of piriformis with recreation of radicular symptoms with trp. Taut left hip IR/ER with pain in hip with PROM  -BB          User Key  (r) = Recorded By, (t) = Taken By, (c) = Cosigned By    Initials Name Provider Type    Vicky Gonzalez, PT DPT Physical Therapist                             PT Assessment/Plan     Row Name 07/18/22 0817          PT Assessment    Assessment Comments Radicular symptoms are mildly improved with SI mobes and sacral mobes. Best recreation and change of symptoms with treatment to piriformis. Taut hip ER/IR left hip with increased pain.  -BB            PT Plan    PT Frequency 1x/week;2x/week  -BB     Predicted Duration of Therapy Intervention (PT) 4-6 weeks  -BB     PT Plan Comments possible dr needling to piriformis  -BB           User Key  (r) = Recorded By, (t) = Taken By, (c) = Cosigned By    Initials Name Provider Type    Vicky Gonzalez, PT DPT Physical Therapist                   OP Exercises     Row Name 07/18/22 0817             Subjective Comments    Subjective Comments Reports pain down leg and calf and foot. unable to walk and run without pain increasing immediately  -BB              Subjective Pain    Able to rate subjective pain? yes  -BB      Pre-Treatment Pain Level 5  -BB      Post-Treatment Pain Level 5  -BB              Exercise 1    Exercise Name 1 alignment check  -BB      Additional Comments MET for left posterior rotation  -BB              Exercise 2    Exercise Name 2 Left long arm distraction  -BB      Time 2 2'  -BB              Exercise 3    Exercise Name 3 lumbar traction with belt  -BB      Time 3 5'  -BB      Additional Comments hips flexed and hooklying no change  -BB              Exercise 4    Exercise Name 4 SL SI  rocking  -BB      Time 4 1'  -BB              Exercise 5    Exercise Name 5 prone PA mobes to L3-S1  -BB      Time 5 4'  -BB              Exercise 6    Exercise Name 6 TrP/MFR to piriformis  -BB      Time 6 5'  -BB              Exercise 7    Exercise Name 7 Passive nerve glide on left  -BB      Reps 7 10  -BB              Exercise 8    Exercise Name 8 HEP for nerve glide and piriformis stretch and hold clamshells  -BB            User Key  (r) = Recorded By, (t) = Taken By, (c) = Cosigned By    Initials Name Provider Type    Vicky Gonzalez, PT DPT Physical Therapist                              PT OP Goals     Row Name 07/18/22 0817          Long Term Goals    LTG Date to Achieve 08/03/22  -BB     LTG 1 No increased pain kneeling on left knee  -BB     LTG 1 Progress Not Met  -BB     LTG 2 hip abduction strength 4+/5  -BB     LTG 2 Progress Not Met  -BB     LTG 3 decrease ttp of soleus  -BB     LTG 3 Progress Not Met  -BB            Time Calculation    PT Goal Re-Cert Due Date 08/01/22  -BB           User Key  (r) = Recorded By, (t) = Taken By, (c) = Cosigned By    Initials Name Provider Type    Vicky Gonzalez PT DPT Physical Therapist                               Time Calculation:   Start Time: 0817  Stop Time: 0850  Time Calculation (min): 33 min  Therapy Charges for Today     Code Description Service Date Service Provider Modifiers Qty    64441635950 HC PT MANUAL THERAPY EA 15 MIN 7/18/2022 Vicky Pickering, PT DPT GP 2                    Vicky Pickering PT DPT  7/18/2022

## 2022-07-19 ENCOUNTER — HOSPITAL ENCOUNTER (OUTPATIENT)
Dept: PHYSICAL THERAPY | Facility: HOSPITAL | Age: 52
Setting detail: THERAPIES SERIES
Discharge: HOME | End: 2022-07-19

## 2022-07-19 DIAGNOSIS — M25.552 LEFT HIP PAIN: Primary | ICD-10-CM

## 2022-07-19 DIAGNOSIS — M79.605 LEFT LEG PAIN: ICD-10-CM

## 2022-07-19 PROCEDURE — 97140 MANUAL THERAPY 1/> REGIONS: CPT | Performed by: PHYSICAL THERAPIST

## 2022-07-19 NOTE — THERAPY TREATMENT NOTE
Outpatient Physical Therapy Ortho Treatment Note  [facility]     Patient Name: Vanessa Beach  : 1970  MRN: [id number]  Today's Date: 2022      Visit Date: 2022    Visit Dx:    ICD-10-CM ICD-9-CM   1. Left hip pain  M25.552 719.45   2. Left leg pain  M79.605 729.5       Patient Active Problem List   Diagnosis   • Plantar fasciitis   • Acquired hypothyroidism   • Soft tissue mass        Past Medical History:   Diagnosis Date   • Abnormal mammography    • Acquired hypothyroidism    • Acute bronchitis    • Acute otitis externa    • Allergic reaction     Allergic reaction to substance - Poison IVY      • Alopecia    • Aphthous ulcer of mouth    • Contact dermatitis due to plants, except food    • Cough    • Fever    • GERD (gastroesophageal reflux disease)    • Herpes zoster    • PONV (postoperative nausea and vomiting)    • Presbyopia    • Upper respiratory infection    • Vitamin D deficiency    • Wheezing         Past Surgical History:   Procedure Laterality Date   • APPENDECTOMY N/A 2021    Procedure: LAPAROSCOPIC  APPENDECTOMY;  Surgeon: Satnam Nina MD;  Location: [facility] OR;  Service: General;  Laterality: N/A;   • COLONOSCOPY N/A 3/12/2021    Procedure: COLONOSCOPY;  Surgeon: Ryan Baker DO;  Location: [facility] ENDOSCOPY;  Service: Gastroenterology;  Laterality: N/A;   • HYSTERECTOMY     • INJECTION OF MEDICATION  2014    KENALOG(2)   • LAPAROSCOPIC TUBAL LIGATION     • MASS EXCISION Right 2018    Procedure: EXCISE SOFT TISSUE MASS RIGHT BUTTOCK       (ANESTHESIA REQUEST YUEN);  Surgeon: Miguelito Galvan MD;  Location: [facility];  Service: General   • THYROIDECTOMY, PARTIAL  2000        PT Ortho     Row Name 22 0800       Subjective Comments    Subjective Comments Reports sleeping last night and felt some better.  -BB       Precautions and Contraindications    Precautions/Limitations no known precautions/limitations  -BB       Subjective Pain     Able to rate subjective pain? yes  -BB    Pre-Treatment Pain Level 4  -BB    Post-Treatment Pain Level --  a little in calf  -BB       Posture/Observations    Posture/Observations Comments left up slip, active trp in piriformis  -BB       Hip/Thigh Palpation    Gluteus Minimus Tender  -BB    Piriformis Tender  with active triggers  -BB    SI Tender  -BB          User Key  (r) = Recorded By, (t) = Taken By, (c) = Cosigned By    Initials Name Provider Type    Vicky Gonzalez, PT DPT Physical Therapist                             PT Assessment/Plan     Row Name 07/19/22 0800          PT Assessment    Assessment Comments Active trigger points in piriformis on sacral border, tender throughout tissue and of glute min. Improved radicular symptoms with treatment this date.  -BB            PT Plan    PT Frequency 1x/week;2x/week  -BB     Predicted Duration of Therapy Intervention (PT) 4-6 weeks  -BB     PT Plan Comments continue treatment to priformis and glutes  -BB           User Key  (r) = Recorded By, (t) = Taken By, (c) = Cosigned By    Initials Name Provider Type    Vicky Gonzalez, PT DPT Physical Therapist                   OP Exercises     Row Name 07/19/22 0800             Subjective Comments    Subjective Comments Reports sleeping last night and felt some better.  -BB              Subjective Pain    Able to rate subjective pain? yes  -BB      Pre-Treatment Pain Level 4  -BB      Post-Treatment Pain Level --  a little in calf  -BB              Exercise 1    Exercise Name 1 MFR and trp to piriformis and gluteals  -BB      Additional Comments recreated referrel to calf with piriformis, ttp of glutes  -BB              Exercise 2    Exercise Name 2 Inferior hip distraction  -BB      Time 2 2'  -BB              Exercise 3    Exercise Name 3 lateral hip distraction  -BB      Time 3 2'  -BB              Exercise 4    Exercise Name 4 ice to go  -BB            User Key  (r) = Recorded By, (t) = Taken By, (c) = Cosigned  By    Initials Name Provider Type    Vicky Gonzalez, MARY DPT Physical Therapist                              PT OP Goals     Row Name 07/19/22 0800          Long Term Goals    LTG Date to Achieve 08/03/22  -BB     LTG 1 No increased pain kneeling on left knee  -BB     LTG 1 Progress Not Met  -BB     LTG 2 hip abduction strength 4+/5  -BB     LTG 2 Progress Not Met  -BB     LTG 3 decrease ttp of soleus  -BB     LTG 3 Progress Not Met  -BB            Time Calculation    PT Goal Re-Cert Due Date 08/01/22  -BB           User Key  (r) = Recorded By, (t) = Taken By, (c) = Cosigned By    Initials Name Provider Type    Vicky Gonzalez, PT DPT Physical Therapist                Therapy Education  Education Details: supine 90 HS glide, pirifromis stretch and ice              Time Calculation:   Start Time: 0800  Stop Time: 0839  Time Calculation (min): 39 min  Therapy Charges for Today     Code Description Service Date Service Provider Modifiers Qty    11645775870 HC PT MANUAL THERAPY EA 15 MIN 7/19/2022 Vicky Pickering, MARY DPT GP 3                    Vicky Pickering PT DPT  7/19/2022

## 2022-07-25 ENCOUNTER — HOSPITAL ENCOUNTER (OUTPATIENT)
Dept: PHYSICAL THERAPY | Facility: HOSPITAL | Age: 52
Setting detail: THERAPIES SERIES
Discharge: HOME | End: 2022-07-25

## 2022-07-25 DIAGNOSIS — M25.552 LEFT HIP PAIN: Primary | ICD-10-CM

## 2022-07-25 DIAGNOSIS — M79.605 LEFT LEG PAIN: ICD-10-CM

## 2022-07-25 PROCEDURE — 97140 MANUAL THERAPY 1/> REGIONS: CPT | Performed by: PHYSICAL THERAPIST

## 2022-07-25 NOTE — THERAPY TREATMENT NOTE
Outpatient Physical Therapy Ortho Treatment Note  [facility]     Patient Name: Vanessa Beach  : 1970  MRN: [id number]  Today's Date: 2022      Visit Date: 2022    Visit Dx:    ICD-10-CM ICD-9-CM   1. Left hip pain  M25.552 719.45   2. Left leg pain  M79.605 729.5       Patient Active Problem List   Diagnosis   • Plantar fasciitis   • Acquired hypothyroidism   • Soft tissue mass        Past Medical History:   Diagnosis Date   • Abnormal mammography    • Acquired hypothyroidism    • Acute bronchitis    • Acute otitis externa    • Allergic reaction     Allergic reaction to substance - Poison IVY      • Alopecia    • Aphthous ulcer of mouth    • Contact dermatitis due to plants, except food    • Cough    • Fever    • GERD (gastroesophageal reflux disease)    • Herpes zoster    • PONV (postoperative nausea and vomiting)    • Presbyopia    • Upper respiratory infection    • Vitamin D deficiency    • Wheezing         Past Surgical History:   Procedure Laterality Date   • APPENDECTOMY N/A 2021    Procedure: LAPAROSCOPIC  APPENDECTOMY;  Surgeon: Satnam Nina MD;  Location: [facility] OR;  Service: General;  Laterality: N/A;   • COLONOSCOPY N/A 3/12/2021    Procedure: COLONOSCOPY;  Surgeon: Ryan Baker DO;  Location: [facility] ENDOSCOPY;  Service: Gastroenterology;  Laterality: N/A;   • HYSTERECTOMY     • INJECTION OF MEDICATION  2014    KENALOG(2)   • LAPAROSCOPIC TUBAL LIGATION     • MASS EXCISION Right 2018    Procedure: EXCISE SOFT TISSUE MASS RIGHT BUTTOCK       (ANESTHESIA REQUEST YUEN);  Surgeon: Miguelito Galvan MD;  Location: [facility];  Service: General   • THYROIDECTOMY, PARTIAL  2000        PT Ortho     Row Name 22 1056       Subjective Comments    Subjective Comments Reports pain is making her nauseated  -BB       Precautions and Contraindications    Precautions/Limitations no known precautions/limitations  -BB       Subjective Pain     "Post-Treatment Pain Level --  \"better\"  -BB       Posture/Observations    Posture/Observations Comments left up slip, lumbars appear in good alignment. no ttp of calf or thigh.  -BB          User Key  (r) = Recorded By, (t) = Taken By, (c) = Cosigned By    Initials Name Provider Type    Vicky Gonzalez, PT DPT Physical Therapist                             PT Assessment/Plan     Row Name 07/25/22 1100 07/25/22 1056       PT Assessment    Assessment Comments Twitch response and muscle relaxation L quadratus lumborum.  -SS Reported improved pain with dry needling, mild discomfort with stairs but less than prior to session  -BB       PT Plan    PT Frequency -- 1x/week;2x/week  -BB    Predicted Duration of Therapy Intervention (PT) -- 4-6 weeks  -BB    PT Plan Comments -- monitor response to dry needling  -BB          User Key  (r) = Recorded By, (t) = Taken By, (c) = Cosigned By    Initials Name Provider Type    Vicky Gonzalez, PT DPT Physical Therapist    SS Thomas Dexter, PT DPT Physical Therapist                   OP Exercises     Row Name 07/25/22 1056             Subjective Comments    Subjective Comments Reports pain is making her nauseated  -BB              Subjective Pain    Able to rate subjective pain? yes  -BB      Pre-Treatment Pain Level 4  -BB      Post-Treatment Pain Level --  \"better\"  -BB              Exercise 1    Exercise Name 1 long arm distraction  -BB      Time 1 2'  -BB              Exercise 2    Exercise Name 2 MFR/TrP QL, and SI rocking  -BB      Time 2 2'  -BB              Exercise 3    Exercise Name 3 Lumbar distraction with belt  -BB      Time 3 --  increased radicular pain  -BB      Additional Comments 10'  -BB              Exercise 4    Exercise Name 4 left inferior hip mobe  -BB      Time 4 5'  -BB              Exercise 5    Exercise Name 5 --  -BB            User Key  (r) = Recorded By, (t) = Taken By, (c) = Cosigned By    Initials Name Provider Type    Vicky Gonzalez, " PT DPT Physical Therapist                         Manual Rx (last 36 hours)     Manual Treatments     Row Name 07/25/22 1100             Manual Rx 1    Manual Rx 1 Location L quadratus lumborum  -SS      Manual Rx 1 Type dry needle & MFR  -SS              Manual Rx 2    Manual Rx 2 Location L lumbar paraspinals  -SS      Manual Rx 2 Type dry needle & MFR  -SS              Manual Rx 3    Manual Rx 3 Location Lumbar spine  -SS      Manual Rx 3 Type joint mobilization  -SS      Manual Rx 3 Grade 4  -SS            User Key  (r) = Recorded By, (t) = Taken By, (c) = Cosigned By    Initials Name Provider Type    SS Thomas Dexter, PT DPT Physical Therapist                 PT OP Goals     Row Name 07/25/22 1056          Long Term Goals    LTG Date to Achieve 08/03/22  -BB     LTG 1 No increased pain kneeling on left knee  -BB     LTG 1 Progress Not Met  -BB     LTG 2 hip abduction strength 4+/5  -BB     LTG 2 Progress Not Met  -BB     LTG 3 decrease ttp of soleus  -BB     LTG 3 Progress Not Met  -BB            Time Calculation    PT Goal Re-Cert Due Date 08/01/22  -BB           User Key  (r) = Recorded By, (t) = Taken By, (c) = Cosigned By    Initials Name Provider Type    BB Vicky Pickering, PT DPT Physical Therapist                               Time Calculation:   Start Time: 1056  Stop Time: 1147  Time Calculation (min): 51 min  Therapy Charges for Today     Code Description Service Date Service Provider Modifiers Qty    96029895604 HC PT MANUAL THERAPY EA 15 MIN 7/25/2022 Vicky Pickering, PT DPT GP 2                    Vicky Pickering PT DPT  7/25/2022

## 2022-07-26 ENCOUNTER — APPOINTMENT (OUTPATIENT)
Dept: PHYSICAL THERAPY | Facility: HOSPITAL | Age: 52
End: 2022-07-26

## 2022-07-28 ENCOUNTER — HOSPITAL ENCOUNTER (OUTPATIENT)
Dept: PHYSICAL THERAPY | Facility: HOSPITAL | Age: 52
Setting detail: THERAPIES SERIES
Discharge: HOME | End: 2022-07-28

## 2022-07-28 DIAGNOSIS — M25.552 LEFT HIP PAIN: Primary | ICD-10-CM

## 2022-07-28 DIAGNOSIS — M79.605 LEFT LEG PAIN: ICD-10-CM

## 2022-07-28 PROCEDURE — 97012 MECHANICAL TRACTION THERAPY: CPT | Performed by: PHYSICAL THERAPIST

## 2022-07-28 PROCEDURE — 97140 MANUAL THERAPY 1/> REGIONS: CPT | Performed by: PHYSICAL THERAPIST

## 2022-07-28 NOTE — THERAPY TREATMENT NOTE
Outpatient Physical Therapy Ortho Treatment Note  [facility]     Patient Name: Vanessa Beach  : 1970  MRN: [id number]  Today's Date: 2022      Visit Date: 2022    Visit Dx:    ICD-10-CM ICD-9-CM   1. Left hip pain  M25.552 719.45   2. Left leg pain  M79.605 729.5       Patient Active Problem List   Diagnosis   • Plantar fasciitis   • Acquired hypothyroidism   • Soft tissue mass        Past Medical History:   Diagnosis Date   • Abnormal mammography    • Acquired hypothyroidism    • Acute bronchitis    • Acute otitis externa    • Allergic reaction     Allergic reaction to substance - Poison IVY      • Alopecia    • Aphthous ulcer of mouth    • Contact dermatitis due to plants, except food    • Cough    • Fever    • GERD (gastroesophageal reflux disease)    • Herpes zoster    • PONV (postoperative nausea and vomiting)    • Presbyopia    • Upper respiratory infection    • Vitamin D deficiency    • Wheezing         Past Surgical History:   Procedure Laterality Date   • APPENDECTOMY N/A 2021    Procedure: LAPAROSCOPIC  APPENDECTOMY;  Surgeon: Satnam Nina MD;  Location: [facility] OR;  Service: General;  Laterality: N/A;   • COLONOSCOPY N/A 3/12/2021    Procedure: COLONOSCOPY;  Surgeon: Ryan Baker DO;  Location: [facility] ENDOSCOPY;  Service: Gastroenterology;  Laterality: N/A;   • HYSTERECTOMY     • INJECTION OF MEDICATION  2014    KENALOG(2)   • LAPAROSCOPIC TUBAL LIGATION     • MASS EXCISION Right 2018    Procedure: EXCISE SOFT TISSUE MASS RIGHT BUTTOCK       (ANESTHESIA REQUEST YUEN);  Surgeon: Miguelito Galvan MD;  Location: [facility];  Service: General   • THYROIDECTOMY, PARTIAL  2000        PT Ortho     Row Name 22 0800       Subjective Comments    Subjective Comments Feels better in leg. Notes pain more in back and mild in leg.  -BB       Precautions and Contraindications    Precautions/Limitations no known precautions/limitations  -BB        Subjective Pain    Able to rate subjective pain? yes  -BB    Post-Treatment Pain Level --  0  -BB       Posture/Observations    Posture/Observations Comments left upslip, taut and tender of QL and left paraspinals.  -BB          User Key  (r) = Recorded By, (t) = Taken By, (c) = Cosigned By    Initials Name Provider Type    Vicky Gonzalez, PT DPT Physical Therapist                             PT Assessment/Plan     Row Name 07/28/22 0800          PT Assessment    Assessment Comments Appears to be responding well to dry needling with reduced radicular symptoms. Mechanical traction performed this date for static stretch at 30% TBW (140lbs). improved ability to cross LLE this date and no radicular symptoms noted  -BB            PT Plan    PT Frequency 1x/week;2x/week  -BB     Predicted Duration of Therapy Intervention (PT) 4-6 weeks  -BB     PT Plan Comments monitor response to traction-increase to 40%. monitor radicular symptoms  -BB           User Key  (r) = Recorded By, (t) = Taken By, (c) = Cosigned By    Initials Name Provider Type    Vicky Gonzalez, PT DPT Physical Therapist                 Modalities     Row Name 07/28/22 0800             Traction 88134    Traction Type Lumbar  -BB      Duration Intermittent  -BB      Position Hook-lying  -BB      Weight --  42/15  -BB      Hold --  60/10  -BB      Progression --  2 step  -BB            User Key  (r) = Recorded By, (t) = Taken By, (c) = Cosigned By    Initials Name Provider Type    Vicky Gonzalez, PT DPT Physical Therapist               OP Exercises     Row Name 07/28/22 0800             Subjective Comments    Subjective Comments Feels better in leg. Notes pain more in back and mild in leg.  -BB              Subjective Pain    Able to rate subjective pain? yes  -BB      Pre-Treatment Pain Level 0  -BB      Post-Treatment Pain Level --  0  -BB              Exercise 1    Exercise Name 1 mechanical traction-see modalities  -BB      Time 1 15'  -BB         "      Exercise 2    Exercise Name 2 Correction for left up slip with illium distraction  -BB      Time 2 4'  -BB              Exercise 3    Exercise Name 3 passive QL stretch  -BB      Sets 3 3  -BB      Time 3 30\"  -BB              Exercise 4    Exercise Name 4 palpation and gentle MFR to QL with tigger noted proximally and mid belly  -BB      Time 4 5'  -BB              Exercise 5    Exercise Name 5 negative shotgun  -BB      Reps 5 1'  -BB              Exercise 6    Exercise Name 6 discussed gentle progression back to jogging if symptoms remain improved with ice after  -BB            User Key  (r) = Recorded By, (t) = Taken By, (c) = Cosigned By    Initials Name Provider Type    Vicky Gonzalez, PT DPT Physical Therapist                         Manual Rx (last 36 hours)     Manual Treatments     Row Name 07/28/22 0700             Manual Rx 1    Manual Rx 1 Location L quadratus lumborum  -SS      Manual Rx 1 Type dry needle & MFR  -SS              Manual Rx 2    Manual Rx 2 Location L upper lumbar/lower thoracic paraspinals  -SS      Manual Rx 2 Type dry needle & MFR  -SS              Manual Rx 3    Manual Rx 3 Location Lumbar spine  -SS      Manual Rx 3 Type joint mobilization  -SS      Manual Rx 3 Grade 4  -SS            User Key  (r) = Recorded By, (t) = Taken By, (c) = Cosigned By    Initials Name Provider Type    SS Thomas Dexter, PT DPT Physical Therapist                 PT OP Goals     Row Name 07/28/22 0800          Long Term Goals    LTG Date to Achieve 08/03/22  -BB     LTG 1 No increased pain kneeling on left knee  -BB     LTG 1 Progress Not Met  -BB     LTG 2 hip abduction strength 4+/5  -BB     LTG 2 Progress Not Met  -BB     LTG 3 decrease ttp of soleus  -BB     LTG 3 Progress Not Met  -BB            Time Calculation    PT Goal Re-Cert Due Date 08/01/22  -BB           User Key  (r) = Recorded By, (t) = Taken By, (c) = Cosigned By    Initials Name Provider Type    Vicky Gonzalez, PT DPT " Physical Therapist                               Time Calculation:   Start Time: 0800  Stop Time: 0847  Time Calculation (min): 47 min  PT Non-Billable Time (min): 8 min  Therapy Charges for Today     Code Description Service Date Service Provider Modifiers Qty    53137481592 HC PT-TRACTION MECHANICAL 7/28/2022 Vicky Pickering PT DPT  1    [id number]  PT MANUAL THERAPY EA 15 MIN 7/28/2022 Vicky Pickering PT DPT GP 1                    Vicky Pickering, PT DPT  7/28/2022

## 2022-08-02 ENCOUNTER — HOSPITAL ENCOUNTER (OUTPATIENT)
Dept: PHYSICAL THERAPY | Facility: HOSPITAL | Age: 52
Setting detail: THERAPIES SERIES
Discharge: HOME | End: 2022-08-02

## 2022-08-02 DIAGNOSIS — M25.552 LEFT HIP PAIN: ICD-10-CM

## 2022-08-02 DIAGNOSIS — M79.605 LEFT LEG PAIN: Primary | ICD-10-CM

## 2022-08-02 PROCEDURE — 97140 MANUAL THERAPY 1/> REGIONS: CPT | Performed by: PHYSICAL THERAPIST

## 2022-08-02 PROCEDURE — 97012 MECHANICAL TRACTION THERAPY: CPT | Performed by: PHYSICAL THERAPIST

## 2022-08-02 NOTE — THERAPY TREATMENT NOTE
Outpatient Physical Therapy Ortho Treatment Note  [facility]     Patient Name: Vanessa Beach  : 1970  MRN: [id number]  Today's Date: 2022      Visit Date: 2022    Visit Dx:    ICD-10-CM ICD-9-CM   1. Left leg pain  M79.605 729.5   2. Left hip pain  M25.552 719.45       Patient Active Problem List   Diagnosis   • Plantar fasciitis   • Acquired hypothyroidism   • Soft tissue mass        Past Medical History:   Diagnosis Date   • Abnormal mammography    • Acquired hypothyroidism    • Acute bronchitis    • Acute otitis externa    • Allergic reaction     Allergic reaction to substance - Poison IVY      • Alopecia    • Aphthous ulcer of mouth    • Contact dermatitis due to plants, except food    • Cough    • Fever    • GERD (gastroesophageal reflux disease)    • Herpes zoster    • PONV (postoperative nausea and vomiting)    • Presbyopia    • Upper respiratory infection    • Vitamin D deficiency    • Wheezing         Past Surgical History:   Procedure Laterality Date   • APPENDECTOMY N/A 2021    Procedure: LAPAROSCOPIC  APPENDECTOMY;  Surgeon: Satnam Nina MD;  Location: [facility] OR;  Service: General;  Laterality: N/A;   • COLONOSCOPY N/A 3/12/2021    Procedure: COLONOSCOPY;  Surgeon: Ryan Baker DO;  Location: [facility] ENDOSCOPY;  Service: Gastroenterology;  Laterality: N/A;   • HYSTERECTOMY     • INJECTION OF MEDICATION  2014    KENALOG(2)   • LAPAROSCOPIC TUBAL LIGATION     • MASS EXCISION Right 2018    Procedure: EXCISE SOFT TISSUE MASS RIGHT BUTTOCK       (ANESTHESIA REQUEST YUEN);  Surgeon: Miguelito Galvan MD;  Location: [facility];  Service: General   • THYROIDECTOMY, PARTIAL  2000        PT Ortho     Row Name 22 0800       Precautions and Contraindications    Precautions/Limitations no known precautions/limitations  -BB       Subjective Pain    Able to rate subjective pain? yes  -BB    Pre-Treatment Pain Level 3  -BB       Posture/Observations     "Posture/Observations Comments taut and tender QL mid belly, no significant antalgics.  -BB       General ROM    GENERAL ROM COMMENTS No symptom change with full knee flexion but noted increased radicular symptoms with full flexed add piriformis stretch  -BB          User Key  (r) = Recorded By, (t) = Taken By, (c) = Cosigned By    Initials Name Provider Type    BB Vicky Pickering, PT DPT Physical Therapist                             PT Assessment/Plan     Row Name 08/02/22 0800          PT Assessment    Assessment Comments Tolerated increased tx wt this date. Remains to have intermittent radicular symptoms  -BB            PT Plan    PT Frequency 1x/week;2x/week  -BB     Predicted Duration of Therapy Intervention (PT) 4-6 weeks  -BB     PT Plan Comments attempt jogging next  -BB           User Key  (r) = Recorded By, (t) = Taken By, (c) = Cosigned By    Initials Name Provider Type    BB Vicky Pickering, PT DPT Physical Therapist                 Modalities     Row Name 08/02/22 0800             Subjective Comments    Subjective Comments Reports friday had low back pain but no leg pain. Started going to a chiropractor without symptom changes.  -BB              Traction 42186    Traction Type Lumbar  -BB      Duration Intermittent  -BB      Position Hook-lying  -BB      Weight --  50/15  -BB      Hold 6  -BB            User Key  (r) = Recorded By, (t) = Taken By, (c) = Cosigned By    Initials Name Provider Type    Vicky Gonzalez, PT DPT Physical Therapist               OP Exercises     Row Name 08/02/22 0800             Subjective Comments    Subjective Comments Reports friday had low back pain but no leg pain. Started going to a chiropractor without symptom changes.  -BB              Subjective Pain    Able to rate subjective pain? yes  -BB      Pre-Treatment Pain Level 3  -BB      Post-Treatment Pain Level --  \"better\"  -BB              Exercise 1    Exercise Name 1 mechanical traction-see modalities  -BB      Time " "1 15  -BB              Exercise 2    Exercise Name 2 Hip joint mobes inferior  -BB      Time 2 5'  -BB              Exercise 3    Exercise Name 3 QL TrP  -BB      Time 3 10'  -BB              Exercise 4    Exercise Name 4 SI rocking- no change  -BB      Time 4 1'  -BB              Exercise 5    Exercise Name 5 Hip figure 4 stretch  -BB      Sets 5 5  -BB      Time 5 30\"  -BB              Exercise 6    Exercise Name 6 piriformis stretch  -BB      Sets 6 5  -BB      Time 6 30'  -BB              Exercise 7    Exercise Name 7 long arm distraction  -BB      Time 7 2'  -BB            User Key  (r) = Recorded By, (t) = Taken By, (c) = Cosigned By    Initials Name Provider Type    Vicky Gonzalez PT DPT Physical Therapist                              PT OP Goals     Row Name 08/02/22 0800          Long Term Goals    LTG Date to Achieve 08/03/22  -BB     LTG 1 No increased pain kneeling on left knee  -BB     LTG 1 Progress Not Met;Partially Met  -BB     LTG 1 Progress Comments intermittently has pain  -BB     LTG 2 hip abduction strength 4+/5  -BB     LTG 2 Progress Not Met  -BB     LTG 3 decrease ttp of soleus  -BB     LTG 3 Progress Met  -BB            Time Calculation    PT Goal Re-Cert Due Date 08/01/22  -BB           User Key  (r) = Recorded By, (t) = Taken By, (c) = Cosigned By    Initials Name Provider Type    Vicky Gonzalez PT DPT Physical Therapist                               Time Calculation:   Start Time: 0800  Stop Time: 0847  Time Calculation (min): 47 min  Therapy Charges for Today     Code Description Service Date Service Provider Modifiers Qty    45265657580 HC PT-TRACTION MECHANICAL 8/2/2022 Vicky Pickering PT DPT  1    [id number] HC PT MANUAL THERAPY EA 15 MIN 8/2/2022 Vicky Pickering PT DPT GP 2                    Vicky Pickering PT DPT  8/2/2022     "

## 2022-08-04 ENCOUNTER — HOSPITAL ENCOUNTER (OUTPATIENT)
Dept: PHYSICAL THERAPY | Facility: HOSPITAL | Age: 52
Setting detail: THERAPIES SERIES
Discharge: HOME | End: 2022-08-04

## 2022-08-04 DIAGNOSIS — M79.605 LEFT LEG PAIN: Primary | ICD-10-CM

## 2022-08-04 DIAGNOSIS — M25.552 LEFT HIP PAIN: ICD-10-CM

## 2022-08-04 PROCEDURE — 97110 THERAPEUTIC EXERCISES: CPT | Performed by: PHYSICAL THERAPIST

## 2022-08-04 PROCEDURE — 97012 MECHANICAL TRACTION THERAPY: CPT | Performed by: PHYSICAL THERAPIST

## 2022-08-04 NOTE — THERAPY TREATMENT NOTE
Outpatient Physical Therapy Ortho Treatment Note  [facility]     Patient Name: Vanessa Beach  : 1970  MRN: [id number]  Today's Date: 2022      Visit Date: 2022    Visit Dx:    ICD-10-CM ICD-9-CM   1. Left leg pain  M79.605 729.5   2. Left hip pain  M25.552 719.45       Patient Active Problem List   Diagnosis   • Plantar fasciitis   • Acquired hypothyroidism   • Soft tissue mass        Past Medical History:   Diagnosis Date   • Abnormal mammography    • Acquired hypothyroidism    • Acute bronchitis    • Acute otitis externa    • Allergic reaction     Allergic reaction to substance - Poison IVY      • Alopecia    • Aphthous ulcer of mouth    • Contact dermatitis due to plants, except food    • Cough    • Fever    • GERD (gastroesophageal reflux disease)    • Herpes zoster    • PONV (postoperative nausea and vomiting)    • Presbyopia    • Upper respiratory infection    • Vitamin D deficiency    • Wheezing         Past Surgical History:   Procedure Laterality Date   • APPENDECTOMY N/A 2021    Procedure: LAPAROSCOPIC  APPENDECTOMY;  Surgeon: Satnam Nina MD;  Location: [facility] OR;  Service: General;  Laterality: N/A;   • COLONOSCOPY N/A 3/12/2021    Procedure: COLONOSCOPY;  Surgeon: Ryan Baker DO;  Location: [facility] ENDOSCOPY;  Service: Gastroenterology;  Laterality: N/A;   • HYSTERECTOMY     • INJECTION OF MEDICATION  2014    KENALOG(2)   • LAPAROSCOPIC TUBAL LIGATION     • MASS EXCISION Right 2018    Procedure: EXCISE SOFT TISSUE MASS RIGHT BUTTOCK       (ANESTHESIA REQUEST YUEN);  Surgeon: Miguelito Galvan MD;  Location: [facility] OR;  Service: General   • THYROIDECTOMY, PARTIAL  2000        PT Ortho     Row Name 22 0800       Subjective Comments    Subjective Comments A little left back and left thigh pain. Pain is worse at night.  -BB       Precautions and Contraindications    Precautions/Limitations no known precautions/limitations  -BB        Subjective Pain    Post-Treatment Pain Level 1  -BB       Posture/Observations    Posture/Observations Comments taut and tender bands of QL on right.  -BB       Pathomechanics    Pathomechanics Comments no stride differences noted L to R with jogging this date.  -BB          User Key  (r) = Recorded By, (t) = Taken By, (c) = Cosigned By    Initials Name Provider Type    Vicky Gonzalez, PT DPT Physical Therapist                             PT Assessment/Plan     Row Name 08/04/22 0800          PT Assessment    Assessment Comments Good tolerance to short jog without compensations noted or increased symptoms. Alignment adjustment required of SI this date prior to jog but remained appropriate after. Noted active TrP in right QL to glutes/ .  -BB            PT Plan    PT Frequency 1x/week;2x/week  -BB     Predicted Duration of Therapy Intervention (PT) 4-6 weeks  -BB     PT Plan Comments recheck next visit  -BB           User Key  (r) = Recorded By, (t) = Taken By, (c) = Cosigned By    Initials Name Provider Type    Vicky Gonzalez, PT DPT Physical Therapist                 Modalities     Row Name 08/04/22 0800             Traction 67699    Traction Type Lumbar  -BB      Duration Intermittent  -BB      Position Hook-lying  -BB      Weight --  55/10  -BB      Hold --  60/10  -BB            User Key  (r) = Recorded By, (t) = Taken By, (c) = Cosigned By    Initials Name Provider Type    Vicky Gonzalez, PT DPT Physical Therapist               OP Exercises     Row Name 08/04/22 0800             Subjective Comments    Subjective Comments A little left back and left thigh pain. Pain is worse at night.  -BB              Subjective Pain    Able to rate subjective pain? yes  -BB      Pre-Treatment Pain Level 1  -BB      Post-Treatment Pain Level 1  -BB              Exercise 1    Exercise Name 1 SI correction for a left rotation and upslip  -BB      Time 1 2'  -BB              Exercise 2    Exercise Name 2 Jog for half mile   -BB      Time 2 8'  -BB              Exercise 3    Exercise Name 3 mechanical traction  -BB      Time 3 15'  -BB              Exercise 4    Exercise Name 4 Sacral rocking  -BB      Time 4 1'  -BB              Exercise 5    Exercise Name 5 TrP to right QL  -BB      Time 5 5'  -BB            User Key  (r) = Recorded By, (t) = Taken By, (c) = Cosigned By    Initials Name Provider Type    Vicky Gonzalez, PT DPT Physical Therapist                              PT OP Goals     Row Name 08/04/22 0800          Long Term Goals    LTG Date to Achieve 08/03/22  -BB     LTG 1 No increased pain kneeling on left knee  -BB     LTG 1 Progress Not Met;Partially Met  -BB     LTG 2 hip abduction strength 4+/5  -BB     LTG 2 Progress Not Met  -BB     LTG 3 decrease ttp of soleus  -BB     LTG 3 Progress Met  -BB            Time Calculation    PT Goal Re-Cert Due Date 08/01/22  -BB           User Key  (r) = Recorded By, (t) = Taken By, (c) = Cosigned By    Initials Name Provider Type    Vicky Gonzalez PT DPT Physical Therapist                               Time Calculation:   Start Time: 0803  Stop Time: 0850  Time Calculation (min): 47 min  Therapy Charges for Today     Code Description Service Date Service Provider Modifiers Qty    49501891258 HC PT THER PROC EA 15 MIN 8/4/2022 Vicky Pickering PT DPT GP 2    01469522797 HC PT-TRACTION MECHANICAL 8/4/2022 Vicky Pickering PT DPT  1                    Vicky Pickering PT DPT  8/4/2022

## 2022-08-10 ENCOUNTER — APPOINTMENT (OUTPATIENT)
Dept: PHYSICAL THERAPY | Facility: HOSPITAL | Age: 52
End: 2022-08-10

## 2022-08-11 ENCOUNTER — APPOINTMENT (OUTPATIENT)
Dept: PHYSICAL THERAPY | Facility: HOSPITAL | Age: 52
End: 2022-08-11

## 2022-08-11 ENCOUNTER — HOSPITAL ENCOUNTER (OUTPATIENT)
Dept: PHYSICAL THERAPY | Facility: HOSPITAL | Age: 52
Setting detail: THERAPIES SERIES
Discharge: HOME | End: 2022-08-11

## 2022-08-11 DIAGNOSIS — M25.552 LEFT HIP PAIN: ICD-10-CM

## 2022-08-11 DIAGNOSIS — M79.605 LEFT LEG PAIN: Primary | ICD-10-CM

## 2022-08-11 PROCEDURE — 97012 MECHANICAL TRACTION THERAPY: CPT | Performed by: PHYSICAL THERAPIST

## 2022-08-11 PROCEDURE — 97140 MANUAL THERAPY 1/> REGIONS: CPT | Performed by: PHYSICAL THERAPIST

## 2022-08-11 NOTE — THERAPY PROGRESS REPORT/RE-CERT
Outpatient Physical Therapy Ortho Progress Note  [facility]     Patient Name: Vanessa Beach  : 1970  MRN: [id number]  Today's Date: 2022      Visit Date: 2022    Visit Dx:    ICD-10-CM ICD-9-CM   1. Left leg pain  M79.605 729.5   2. Left hip pain  M25.552 719.45       Patient Active Problem List   Diagnosis   • Plantar fasciitis   • Acquired hypothyroidism   • Soft tissue mass        Past Medical History:   Diagnosis Date   • Abnormal mammography    • Acquired hypothyroidism    • Acute bronchitis    • Acute otitis externa    • Allergic reaction     Allergic reaction to substance - Poison IVY      • Alopecia    • Aphthous ulcer of mouth    • Contact dermatitis due to plants, except food    • Cough    • Fever    • GERD (gastroesophageal reflux disease)    • Herpes zoster    • PONV (postoperative nausea and vomiting)    • Presbyopia    • Upper respiratory infection    • Vitamin D deficiency    • Wheezing         Past Surgical History:   Procedure Laterality Date   • APPENDECTOMY N/A 2021    Procedure: LAPAROSCOPIC  APPENDECTOMY;  Surgeon: Satnam Nina MD;  Location: [facility] OR;  Service: General;  Laterality: N/A;   • COLONOSCOPY N/A 3/12/2021    Procedure: COLONOSCOPY;  Surgeon: Ryan Baker DO;  Location: [facility] ENDOSCOPY;  Service: Gastroenterology;  Laterality: N/A;   • HYSTERECTOMY     • INJECTION OF MEDICATION  2014    KENALOG(2)   • LAPAROSCOPIC TUBAL LIGATION     • MASS EXCISION Right 2018    Procedure: EXCISE SOFT TISSUE MASS RIGHT BUTTOCK       (ANESTHESIA REQUEST YUEN);  Surgeon: Miguelito Galvan MD;  Location: [facility];  Service: General   • THYROIDECTOMY, PARTIAL  2000        PT Ortho     Row Name 22 0800       Precautions and Contraindications    Precautions/Limitations no known precautions/limitations  -BB       Posture/Observations    Posture/Observations Comments no signficant palpation tenderness noted of gluteals, piriformis or HS   -BB       Special Tests/Palpation    Special Tests/Palpation Lumbar/SI  -BB       Lumbar/SI Special Tests    Long Sit Test (Pelvic Malalignment) Negative  -BB    SLR (Neural Tension) Left:;Positive  -BB    Lumbar/SI Special Tests Comments ASIS equal, LL equal  -BB       Lumbosacral Palpation    Lumbosacral Palpation? No Tenderness/Abnormality  -BB       General ROM    GENERAL ROM COMMENTS trunk ROM is WNL, good joint mobility of the lumbosacral segments  -BB       MMT (Manual Muscle Testing)    General MMT Comments hip abd:4-/5, hip ER: 4/5, hip flex:5/5, knee is 5/5  -BB       Sensation    Sensation WNL? WNL  -BB          User Key  (r) = Recorded By, (t) = Taken By, (c) = Cosigned By    Initials Name Provider Type    Vicky Gonzalez, PT DPT Physical Therapist                             PT Assessment/Plan     Row Name 08/11/22 0800          PT Assessment    Functional Limitations Performance in leisure activities;Impaired gait  -BB     Impairments Gait;Pain;Muscle strength;Poor body mechanics;Impaired muscle endurance  -BB     Assessment Comments Encouraged patient to return to normal running routine to monitor symptoms. Noted today to have improved tissue tenderness, good mobility of the lumbosacral spine. Continues to present with radicular symptoms with neural tension on left and improve radicular symptoms after traction.  -BB     Rehab Potential Good  -BB     Patient/caregiver participated in establishment of treatment plan and goals Yes  -BB     Patient would benefit from skilled therapy intervention Yes  -BB            PT Plan    PT Frequency 1x/week;2x/week  -BB     Predicted Duration of Therapy Intervention (PT) 2-3 weeks  -BB     PT Plan Comments conitnue manual, traction, stretching, strength  -BB           User Key  (r) = Recorded By, (t) = Taken By, (c) = Cosigned By    Initials Name Provider Type    Vicky Gonzalez, PT DPT Physical Therapist                 Modalities     Row Name 08/11/22 0800     "         Traction 44115    Traction Type Lumbar  -BB      Duration Intermittent  -BB      Position Hook-lying  -BB      Weight --  55/10  -BB      Hold --  60/10  -BB      Progression --  2 step  -BB            User Key  (r) = Recorded By, (t) = Taken By, (c) = Cosigned By    Initials Name Provider Type    Vicky Gonzalez, PT DPT Physical Therapist               OP Exercises     Row Name 08/11/22 0800             Subjective Comments    Subjective Comments Feels better overall, taking Naproxen and has helped. Pain worse at night and trying to improve sleeping position.  -BB              Subjective Pain    Able to rate subjective pain? yes  -BB      Pre-Treatment Pain Level --  feel it in calf  -BB      Post-Treatment Pain Level --  dont feel anything in calf  -BB              Exercise 1    Exercise Name 1 recheck  -BB      Additional Comments no real significant palpation tenderness note d  -BB              Exercise 2    Exercise Name 2 Hip IR stretch with contract relax 3 x  -BB      Sets 2 4  -BB      Time 2 15\" holds  -BB              Exercise 3    Exercise Name 3 Sciatic neural glides supine  -BB      Sets 3 2  -BB      Reps 3 10  -BB              Exercise 4    Exercise Name 4 Mechanical traction-see modalities  -BB            User Key  (r) = Recorded By, (t) = Taken By, (c) = Cosigned By    Initials Name Provider Type    Vicky Gonzalez, PT DPT Physical Therapist                              PT OP Goals     Row Name 08/11/22 0800          Long Term Goals    LTG Date to Achieve 09/01/22  -BB     LTG 1 No increased pain kneeling on left knee  -BB     LTG 1 Progress Partially Met  -BB     LTG 2 hip abduction strength 4+/5  -BB     LTG 2 Progress Not Met  -BB     LTG 2 Progress Comments 4-/5  -BB     LTG 3 decrease ttp of soleus  -BB     LTG 3 Progress Met  -BB            Time Calculation    PT Goal Re-Cert Due Date 09/01/22  -BB           User Key  (r) = Recorded By, (t) = Taken By, (c) = Cosigned By    " Initials Name Provider Type    Vicky Gonzalez, MARY DPT Physical Therapist                               Time Calculation:   Start Time: 0800  Stop Time: 0846  Time Calculation (min): 46 min  Therapy Charges for Today     Code Description Service Date Service Provider Modifiers Qty    91994104746  PT-TRACTION MECHANICAL 8/11/2022 Vicky Pickering PT DPT  1    [id number]  PT MANUAL THERAPY EA 15 MIN 8/11/2022 Vicky Pickering, PT DPT GP 1    [id number]  PT THER SUPP EA 15 MIN 8/11/2022 Vicky Pickering, PT DPT GP 1                    Vicky Pickering PT DPT  8/11/2022

## 2022-08-16 ENCOUNTER — HOSPITAL ENCOUNTER (OUTPATIENT)
Dept: PHYSICAL THERAPY | Facility: HOSPITAL | Age: 52
Setting detail: THERAPIES SERIES
Discharge: HOME | End: 2022-08-16

## 2022-08-16 DIAGNOSIS — M25.552 LEFT HIP PAIN: ICD-10-CM

## 2022-08-16 DIAGNOSIS — M79.605 LEFT LEG PAIN: Primary | ICD-10-CM

## 2022-08-16 PROCEDURE — 97140 MANUAL THERAPY 1/> REGIONS: CPT | Performed by: PHYSICAL THERAPIST

## 2022-08-16 NOTE — THERAPY TREATMENT NOTE
Outpatient Physical Therapy Ortho Treatment Note  [facility]     Patient Name: Vanessa Beach  : 1970  MRN: [id number]  Today's Date: 2022      Visit Date: 2022    Visit Dx:    ICD-10-CM ICD-9-CM   1. Left leg pain  M79.605 729.5   2. Left hip pain  M25.552 719.45       Patient Active Problem List   Diagnosis   • Plantar fasciitis   • Acquired hypothyroidism   • Soft tissue mass        Past Medical History:   Diagnosis Date   • Abnormal mammography    • Acquired hypothyroidism    • Acute bronchitis    • Acute otitis externa    • Allergic reaction     Allergic reaction to substance - Poison IVY      • Alopecia    • Aphthous ulcer of mouth    • Contact dermatitis due to plants, except food    • Cough    • Fever    • GERD (gastroesophageal reflux disease)    • Herpes zoster    • PONV (postoperative nausea and vomiting)    • Presbyopia    • Upper respiratory infection    • Vitamin D deficiency    • Wheezing         Past Surgical History:   Procedure Laterality Date   • APPENDECTOMY N/A 2021    Procedure: LAPAROSCOPIC  APPENDECTOMY;  Surgeon: Satnam Nina MD;  Location: [facility] OR;  Service: General;  Laterality: N/A;   • COLONOSCOPY N/A 3/12/2021    Procedure: COLONOSCOPY;  Surgeon: Ryan Baker DO;  Location: [facility] ENDOSCOPY;  Service: Gastroenterology;  Laterality: N/A;   • HYSTERECTOMY     • INJECTION OF MEDICATION  2014    KENALOG(2)   • LAPAROSCOPIC TUBAL LIGATION     • MASS EXCISION Right 2018    Procedure: EXCISE SOFT TISSUE MASS RIGHT BUTTOCK       (ANESTHESIA REQUEST YUEN);  Surgeon: Miguelito Galvan MD;  Location: [facility] OR;  Service: General   • THYROIDECTOMY, PARTIAL  2000        PT Ortho     Row Name 22 0800       Subjective Comments    Subjective Comments Feels better overall with chiropractor and PT but remains to have radicular symptoms in LLE.  -BB       Precautions and Contraindications    Precautions/Limitations no known  precautions/limitations  -BB       Subjective Pain    Able to rate subjective pain? yes  -BB    Subjective Pain Comment aching in leg and calf  -BB       Posture/Observations    Posture/Observations Comments No antalgics. Stands with a anterior shifted pelvic and increased lumbar lordosis. Left posterior SI rotation  -BB       Special Tests/Palpation    Special Tests/Palpation Lumbar/SI  -BB       Lumbar/SI Special Tests    Slump Test (Neural Tension) Negative  -BB    Lumbar/SI Special Tests Comments tender of lateral facet T12-L1, L2-3 andQL. No ttp of glutes/piriformis,  -BB          User Key  (r) = Recorded By, (t) = Taken By, (c) = Cosigned By    Initials Name Provider Type    Vicky Gonzalez, PT DPT Physical Therapist                             PT Assessment/Plan     Row Name 08/16/22 0800          PT Assessment    Assessment Comments Recommend hold at this time to monitor symptoms without PT. Suspect possible facet impingment of Left L5-S1. IF treatment today makes significant improvement patient to return but if symptoms remain then will be held to monitor symptoms. Patient agree.  -BB            PT Plan    PT Plan Comments hold and return PRN  -BB           User Key  (r) = Recorded By, (t) = Taken By, (c) = Cosigned By    Initials Name Provider Type    Vicky Gonzalez, PT DPT Physical Therapist                   OP Exercises     Row Name 08/16/22 0800             Subjective Comments    Subjective Comments Feels better overall with chiropractor and PT but remains to have radicular symptoms in LLE.  -BB              Subjective Pain    Able to rate subjective pain? yes  -BB      Subjective Pain Comment aching in leg and calf  -BB              Exercise 1    Exercise Name 1 Prone UPA left and right  -BB      Time 1 10'  -BB      Additional Comments T12-S1  -BB              Exercise 2    Exercise Name 2 SL gapping  -BB      Time 2 5;  -BB      Additional Comments pain at L 1-2  -BB              Exercise 3     Exercise Name 3 MET for a left posterior rotation  -BB      Time 3 3'  -BB      Additional Comments educated on sitting  -BB              Exercise 4    Exercise Name 4 SL TrP to QL and lateral facet of left T12-L3  -BB      Time 4 10'  -BB              Exercise 5    Exercise Name 5 negative slump test  -BB              Exercise 6    Exercise Name 6 POC discussed  -BB            User Key  (r) = Recorded By, (t) = Taken By, (c) = Cosigned By    Initials Name Provider Type    Vicky Gonzalez PT DPT Physical Therapist                              PT OP Goals     Row Name 08/16/22 0800          Long Term Goals    LTG Date to Achieve 09/01/22  -BB     LTG 1 No increased pain kneeling on left knee  -BB     LTG 1 Progress Partially Met  -BB     LTG 2 hip abduction strength 4+/5  -BB     LTG 2 Progress Not Met  -BB     LTG 3 decrease ttp of soleus  -BB     LTG 3 Progress Met  -BB           User Key  (r) = Recorded By, (t) = Taken By, (c) = Cosigned By    Initials Name Provider Type    Vicky Gonzalez PT DPT Physical Therapist                               Time Calculation:   Start Time: 0800  Stop Time: 0845  Time Calculation (min): 45 min  Therapy Charges for Today     Code Description Service Date Service Provider Modifiers Qty    96868536295 HC PT MANUAL THERAPY EA 15 MIN 8/16/2022 Vicky Pickering PT DPT GP 2    82500643469 HC PT THER SUPP EA 15 MIN 8/16/2022 Vicky Pickering PT DPT GP 1                    Vicky Pickering PT DPT  8/16/2022

## 2022-08-18 ENCOUNTER — APPOINTMENT (OUTPATIENT)
Dept: PHYSICAL THERAPY | Facility: HOSPITAL | Age: 52
End: 2022-08-18

## 2022-08-22 ENCOUNTER — OFFICE VISIT (OUTPATIENT)
Dept: FAMILY MEDICINE CLINIC | Facility: CLINIC | Age: 52
End: 2022-08-22

## 2022-08-22 VITALS
WEIGHT: 145.9 LBS | DIASTOLIC BLOOD PRESSURE: 60 MMHG | OXYGEN SATURATION: 99 % | BODY MASS INDEX: 24.31 KG/M2 | HEART RATE: 84 BPM | HEIGHT: 65 IN | SYSTOLIC BLOOD PRESSURE: 100 MMHG

## 2022-08-22 DIAGNOSIS — M54.32 LEFT SIDED SCIATICA: Primary | ICD-10-CM

## 2022-08-22 DIAGNOSIS — M54.16 LUMBAR BACK PAIN WITH RADICULOPATHY AFFECTING LEFT LOWER EXTREMITY: ICD-10-CM

## 2022-08-22 DIAGNOSIS — B00.1 FEVER BLISTER: ICD-10-CM

## 2022-08-22 PROCEDURE — 96372 THER/PROPH/DIAG INJ SC/IM: CPT | Performed by: GENERAL PRACTICE

## 2022-08-22 PROCEDURE — 99213 OFFICE O/P EST LOW 20 MIN: CPT | Performed by: GENERAL PRACTICE

## 2022-08-22 RX ORDER — VALACYCLOVIR HYDROCHLORIDE 1 G/1
1000 TABLET, FILM COATED ORAL AS NEEDED
Qty: 30 TABLET | Refills: 0 | Status: SHIPPED | OUTPATIENT
Start: 2022-08-22

## 2022-08-22 RX ORDER — TRIAMCINOLONE ACETONIDE 40 MG/ML
80 INJECTION, SUSPENSION INTRA-ARTICULAR; INTRAMUSCULAR ONCE
Status: COMPLETED | OUTPATIENT
Start: 2022-08-22 | End: 2022-08-22

## 2022-08-22 RX ADMIN — TRIAMCINOLONE ACETONIDE 80 MG: 40 INJECTION, SUSPENSION INTRA-ARTICULAR; INTRAMUSCULAR at 15:40

## 2022-08-22 NOTE — PROGRESS NOTES
"Subjective   Vanessa Beach is a 52 y.o. female.   Chief Complaint   Patient presents with   • sciatic pain     Left hip leg     Has had pain in left lower back, buttock and leg for last 3 months. Has been to physical therapy and chiropractor and not improving. Unable to tolerate nsaids for more than a few days due to fluid retention.   Leg Pain   The incident occurred more than 1 week ago. There was no injury mechanism. The pain is present in the left hip and left leg. The quality of the pain is described as burning. The pain is at a severity of 3/10. The pain is mild. Associated symptoms include muscle weakness. Pertinent negatives include no inability to bear weight. The symptoms are aggravated by movement and weight bearing. She has tried NSAIDs and rest (chiropractor, physical therapy) for the symptoms. The treatment provided mild relief.      The following portions of the patient's history were reviewed and updated as appropriate: allergies, current medications, past social history and problem list.    Outpatient Medications Prior to Visit   Medication Sig Dispense Refill   • ACETAMINOPHEN PO Take 500 mg by mouth Every 4 (Four) Hours As Needed.     • Cholecalciferol 1000 UNITS tablet Take 1,000 Units by mouth daily.     • levothyroxine (SYNTHROID, LEVOTHROID) 88 MCG tablet TAKE ONE TABLET BY MOUTH DAILY 90 tablet 3   • magnesium oxide (MAG-OX) 400 MG tablet Take 400 mg by mouth Daily.     • omeprazole (priLOSEC) 20 MG capsule Take 20 mg by mouth Daily. prn     • valACYclovir (VALTREX) 1000 MG tablet Take 1 tablet by mouth As Needed.       No facility-administered medications prior to visit.       Review of Systems  I have reviewed 12 systems with patient. Findings were negative except what is noted below and/or in history of present illness.     Objective   Visit Vitals  /60   Pulse 84   Ht 165.1 cm (65\")   Wt 66.2 kg (145 lb 14.4 oz)   LMP  (LMP Unknown)   SpO2 99%   BMI 24.28 kg/m²     Physical " Exam  Vitals and nursing note reviewed.   Constitutional:       General: She is not in acute distress.     Appearance: She is well-developed.   HENT:      Head: Normocephalic and atraumatic.      Nose: Nose normal.   Eyes:      General:         Right eye: No discharge.         Left eye: No discharge.      Conjunctiva/sclera: Conjunctivae normal.      Pupils: Pupils are equal, round, and reactive to light.   Neck:      Thyroid: No thyromegaly.   Cardiovascular:      Rate and Rhythm: Normal rate and regular rhythm.      Heart sounds: Normal heart sounds.   Pulmonary:      Effort: Pulmonary effort is normal.      Breath sounds: Normal breath sounds.   Musculoskeletal:      Lumbar back: Tenderness present. Negative right straight leg raise test and negative left straight leg raise test.        Back:    Lymphadenopathy:      Cervical: No cervical adenopathy.   Skin:     General: Skin is warm and dry.   Neurological:      Mental Status: She is alert and oriented to person, place, and time.      Deep Tendon Reflexes:      Reflex Scores:       Patellar reflexes are 2+ on the right side and 2+ on the left side.      Notes brought forward are reviewed and updated if indicated.     Assessment & Plan   Problems Addressed this Visit    None     Visit Diagnoses     Left sided sciatica    -  Primary    Relevant Medications    triamcinolone acetonide (KENALOG-40) injection 80 mg (Completed)    Fever blister        Relevant Medications    valACYclovir (VALTREX) 1000 MG tablet    Lumbar back pain with radiculopathy affecting left lower extremity        Relevant Medications    celecoxib (CeleBREX) 200 MG capsule    Other Relevant Orders    MRI Lumbar Spine Without Contrast      Diagnoses       Codes Comments    Left sided sciatica    -  Primary ICD-10-CM: M54.32  ICD-9-CM: 724.3     Fever blister     ICD-10-CM: B00.1  ICD-9-CM: 054.9     Lumbar back pain with radiculopathy affecting left lower extremity     ICD-10-CM:  M54.16  ICD-9-CM: 724.4           Continue current treatment. Recheck if not improving.      New Medications Ordered This Visit   Medications   • triamcinolone acetonide (KENALOG-40) injection 80 mg   • valACYclovir (VALTREX) 1000 MG tablet     Sig: Take 1 tablet by mouth As Needed (fever blister).     Dispense:  30 tablet     Refill:  0   • celecoxib (CeleBREX) 200 MG capsule     Sig: Take 1 capsule by mouth Daily As Needed for Moderate Pain .     Dispense:  30 capsule     Refill:  2     No follow-ups on file.        This document has been electronically signed by Meghana Connolly MD on August 29, 2022 17:26 CDT

## 2022-08-23 ENCOUNTER — APPOINTMENT (OUTPATIENT)
Dept: PHYSICAL THERAPY | Facility: HOSPITAL | Age: 52
End: 2022-08-23

## 2022-08-25 ENCOUNTER — APPOINTMENT (OUTPATIENT)
Dept: PHYSICAL THERAPY | Facility: HOSPITAL | Age: 52
End: 2022-08-25

## 2022-08-29 RX ORDER — CELECOXIB 200 MG/1
200 CAPSULE ORAL DAILY PRN
Qty: 30 CAPSULE | Refills: 2 | Status: SHIPPED | OUTPATIENT
Start: 2022-08-29

## 2022-09-07 ENCOUNTER — HOSPITAL ENCOUNTER (OUTPATIENT)
Dept: MRI IMAGING | Facility: HOSPITAL | Age: 52
Discharge: HOME | End: 2022-09-07
Admitting: GENERAL PRACTICE

## 2022-09-07 DIAGNOSIS — M54.16 LUMBAR BACK PAIN WITH RADICULOPATHY AFFECTING LEFT LOWER EXTREMITY: ICD-10-CM

## 2022-09-07 PROCEDURE — 72148 MRI LUMBAR SPINE W/O DYE: CPT

## 2022-10-13 DIAGNOSIS — M79.605 LEFT LEG PAIN: Primary | ICD-10-CM

## 2022-10-13 DIAGNOSIS — M54.32 LEFT SIDED SCIATICA: ICD-10-CM

## 2022-10-19 ENCOUNTER — IMMUNIZATION (OUTPATIENT)
Dept: FAMILY MEDICINE CLINIC | Facility: CLINIC | Age: 52
End: 2022-10-19

## 2022-10-19 DIAGNOSIS — M25.552 LEFT HIP PAIN: ICD-10-CM

## 2022-10-19 DIAGNOSIS — M79.605 LEFT LEG PAIN: Primary | ICD-10-CM

## 2022-10-19 PROCEDURE — 91312 COVID-19 (PFIZER) BIVALENT BOOSTER 12+YRS: CPT | Performed by: SURGERY

## 2022-10-19 PROCEDURE — 0124A COVID-19 (PFIZER) BIVALENT BOOSTER 12+YRS: CPT | Performed by: SURGERY

## 2022-10-24 ENCOUNTER — HOSPITAL ENCOUNTER (OUTPATIENT)
Dept: PHYSICAL THERAPY | Facility: HOSPITAL | Age: 52
Setting detail: THERAPIES SERIES
Discharge: HOME | End: 2022-10-24

## 2022-10-24 DIAGNOSIS — M25.552 LEFT HIP PAIN: ICD-10-CM

## 2022-10-24 DIAGNOSIS — M79.605 LEFT LEG PAIN: Primary | ICD-10-CM

## 2022-10-24 PROCEDURE — 97164 PT RE-EVAL EST PLAN CARE: CPT | Performed by: PHYSICAL THERAPIST

## 2022-10-24 PROCEDURE — 97140 MANUAL THERAPY 1/> REGIONS: CPT | Performed by: PHYSICAL THERAPIST

## 2022-10-24 NOTE — THERAPY RE-EVALUATION
Outpatient Physical Therapy Ortho Re-Evaluation  [facility]     Patient Name: Vanessa Beach  : 1970  MRN: [id number]  Today's Date: 10/24/2022      Visit Date: 10/24/2022    Patient Active Problem List   Diagnosis   • Plantar fasciitis   • Acquired hypothyroidism   • Soft tissue mass        Past Medical History:   Diagnosis Date   • Abnormal mammography    • Acquired hypothyroidism    • Acute bronchitis    • Acute otitis externa    • Allergic reaction     Allergic reaction to substance - Poison IVY      • Alopecia    • Aphthous ulcer of mouth    • Contact dermatitis due to plants, except food    • Cough    • Fever    • GERD (gastroesophageal reflux disease)    • Herpes zoster    • PONV (postoperative nausea and vomiting)    • Presbyopia    • Upper respiratory infection    • Vitamin D deficiency    • Wheezing         Past Surgical History:   Procedure Laterality Date   • APPENDECTOMY N/A 2021    Procedure: LAPAROSCOPIC  APPENDECTOMY;  Surgeon: Satnam iNna MD;  Location: [facility] OR;  Service: General;  Laterality: N/A;   • COLONOSCOPY N/A 3/12/2021    Procedure: COLONOSCOPY;  Surgeon: Ryan Baker DO;  Location: [facility] ENDOSCOPY;  Service: Gastroenterology;  Laterality: N/A;   • HYSTERECTOMY     • INJECTION OF MEDICATION  2014    KENALOG(2)   • LAPAROSCOPIC TUBAL LIGATION     • MASS EXCISION Right 2018    Procedure: EXCISE SOFT TISSUE MASS RIGHT BUTTOCK       (ANESTHESIA REQUEST YUEN);  Surgeon: Miguelito Galvan MD;  Location: [facility] OR;  Service: General   • THYROIDECTOMY, PARTIAL  2000       Visit Dx:     ICD-10-CM ICD-9-CM   1. Left leg pain  M79.605 729.5   2. Left hip pain  M25.552 719.45              PT Ortho     Row Name 10/24/22 0800       Precautions and Contraindications    Precautions/Limitations no known precautions/limitations  -BB       Subjective Pain    Pre-Treatment Pain Level 4  -BB    Post-Treatment Pain Level --  not hurting  -BB        Posture/Observations    Posture/Observations Comments left up slip of SI and slight posterior rotation. mild midstance antalgics  -BB       Special Tests/Palpation    Special Tests/Palpation Hip  -BB       Lumbar/SI Special Tests    Long Sit Test (Pelvic Malalignment) Positive  -BB       Hip/Thigh Palpation    Gluteus Kingsley Tender  -BB    Gluteus Medius Tender  -BB    Gluteus Minimus Tender  -BB    Biceps Formoris Tender;Guarded/taut  -BB    Semimembranosis Tender;Guarded/taut  -BB    Semitendinosis Guarded/taut;Tender  -BB       General ROM    GENERAL ROM COMMENTS IR of left hip is less than right of about 5-10 degrees seen in sitting with legs crossed. Knee ROM is WNL  -BB       MMT (Manual Muscle Testing)    General MMT Comments hip abd 4-/5, hip flexion:4+/5  -BB       Sensation    Sensation WNL? WNL  -BB          User Key  (r) = Recorded By, (t) = Taken By, (c) = Cosigned By    Initials Name Provider Type    Vicky Gonzalez, PT DPT Physical Therapist                                   PT OP Goals     Row Name 10/24/22 0800          Long Term Goals    LTG Date to Achieve 11/14/22  -BB     LTG 1 No increased pain kneeling on left knee  -BB     LTG 1 Progress Not Met  -BB     LTG 2 hip abduction strength 4+/5  -BB     LTG 2 Progress Not Met  -BB     LTG 3 decrease ttp of soleus  -BB     LTG 3 Progress Met  -BB     LTG 4 complete a 2 mile job without increased pain  -BB     LTG 4 Progress Not Met  -BB        Time Calculation    PT Goal Re-Cert Due Date 11/14/22  -BB           User Key  (r) = Recorded By, (t) = Taken By, (c) = Cosigned By    Initials Name Provider Type    Vicky Gonzalez, PT DPT Physical Therapist                 PT Assessment/Plan     Row Name 10/24/22 0800          PT Assessment    Functional Limitations Performance in leisure activities;Impaired gait  -BB     Impairments Gait;Pain;Muscle strength;Poor body mechanics;Impaired muscle endurance  -BB     Assessment Comments Patient presents  today with intermittent radicular pain to the calf that comes and goes. Medication gives temporary relief, symptoms can be exacerbate with long sitting and running. Pain causes antalgic gait at mid stance. No palpable pain of the gastroc felt with possible active trp of the gluteals and hamstring. Could continue to benefit from PT to address deficits. MRI results are inconclusive for left radicular symptoms at this time.  -BB     Rehab Potential Good  -BB     Patient/caregiver participated in establishment of treatment plan and goals Yes  -BB     Patient would benefit from skilled therapy intervention Yes  -BB        PT Plan    PT Frequency 1x/week;2x/week  -BB     Predicted Duration of Therapy Intervention (PT) 6 weeks  -BB     Planned CPT's? PT RE-EVAL: 92123;PT THER PROC EA 15 MIN: 69437;PT THER ACT EA 15 MIN: 66632;PT MANUAL THERAPY EA 15 MIN: 68914;PT GAIT TRAINING EA 15 MIN: 58087;PT ELECTRICAL STIM UNATTEND: ;PT THER SUPP EA 15 MIN;PT SELF CARE/HOME MGMT/TRAIN EA 15: 94793  -BB     PT Plan Comments manual, stretching, strength, HEP for hip strength, monitor si alignment.  -BB           User Key  (r) = Recorded By, (t) = Taken By, (c) = Cosigned By    Initials Name Provider Type    Vicky Gonzalez PT DPT Physical Therapist                   OP Exercises     Row Name 10/24/22 0800             Subjective Comments    Subjective Comments Patient presents today returning back to PT after having MRI without significant findings related to left leg, continued treatment with chiropractor without significant change, has taken Celebrex that assists symptoms until wears off. Wants to return to running but unable and has good and bad days with pain that is in calf and occasionally bottom of foot.  -BB         Subjective Pain    Able to rate subjective pain? yes  -BB      Pre-Treatment Pain Level 4  -BB      Post-Treatment Pain Level --  not hurting  -BB         Exercise 1    Exercise Name 1 Re-evaluation  -BRADY          Exercise 2    Exercise Name 2 correction for SI alignment  -BB      Additional Comments up slip-increase laxity of left hip with distraction.  -BB         Exercise 3    Exercise Name 3 MFR, TrP, cross friction to gluteals and hamstring  -BB      Time 3 25'  -BB         Exercise 4    Exercise Name 4 gentle long arm distraction of left  -BB      Time 4 1'  -BB            User Key  (r) = Recorded By, (t) = Taken By, (c) = Cosigned By    Initials Name Provider Type    BB Vicky Pickering, PT DPT Physical Therapist                                        Time Calculation:     Start Time: 0810  Stop Time: 0846  Time Calculation (min): 36 min     Therapy Charges for Today     Code Description Service Date Service Provider Modifiers Qty    61511192482 HC PT RE-EVAL ESTABLISHED PLAN 2 10/24/2022 Vicky Pickering PT DPT GP 1    81540889509 HC PT MANUAL THERAPY EA 15 MIN 10/24/2022 Vicky Pickering PT DPT GP 2                    Vicky Pickering PT RIANT  10/24/2022

## 2022-10-25 RX ORDER — LEVOTHYROXINE SODIUM 88 UG/1
TABLET ORAL
Qty: 90 TABLET | Refills: 3 | Status: SHIPPED | OUTPATIENT
Start: 2022-10-25 | End: 2022-11-17 | Stop reason: SDUPTHER

## 2022-10-26 ENCOUNTER — HOSPITAL ENCOUNTER (OUTPATIENT)
Dept: PHYSICAL THERAPY | Facility: HOSPITAL | Age: 52
Setting detail: THERAPIES SERIES
Discharge: HOME | End: 2022-10-26

## 2022-10-26 DIAGNOSIS — M79.605 LEFT LEG PAIN: Primary | ICD-10-CM

## 2022-10-26 PROCEDURE — 97140 MANUAL THERAPY 1/> REGIONS: CPT | Performed by: PHYSICAL THERAPIST

## 2022-10-26 NOTE — THERAPY TREATMENT NOTE
Outpatient Physical Therapy Ortho Treatment Note  [facility]     Patient Name: Vanessa Beach  : 1970  MRN: [id number]  Today's Date: 10/26/2022      Visit Date: 10/26/2022    Visit Dx:    ICD-10-CM ICD-9-CM   1. Left leg pain  M79.605 729.5       Patient Active Problem List   Diagnosis   • Plantar fasciitis   • Acquired hypothyroidism   • Soft tissue mass        Past Medical History:   Diagnosis Date   • Abnormal mammography    • Acquired hypothyroidism    • Acute bronchitis    • Acute otitis externa    • Allergic reaction     Allergic reaction to substance - Poison IVY      • Alopecia    • Aphthous ulcer of mouth    • Contact dermatitis due to plants, except food    • Cough    • Fever    • GERD (gastroesophageal reflux disease)    • Herpes zoster    • PONV (postoperative nausea and vomiting)    • Presbyopia    • Upper respiratory infection    • Vitamin D deficiency    • Wheezing         Past Surgical History:   Procedure Laterality Date   • APPENDECTOMY N/A 2021    Procedure: LAPAROSCOPIC  APPENDECTOMY;  Surgeon: Satnam Nina MD;  Location: [facility] OR;  Service: General;  Laterality: N/A;   • COLONOSCOPY N/A 3/12/2021    Procedure: COLONOSCOPY;  Surgeon: Ryan Baker DO;  Location: [facility] ENDOSCOPY;  Service: Gastroenterology;  Laterality: N/A;   • HYSTERECTOMY     • INJECTION OF MEDICATION  2014    KENALOG(2)   • LAPAROSCOPIC TUBAL LIGATION     • MASS EXCISION Right 2018    Procedure: EXCISE SOFT TISSUE MASS RIGHT BUTTOCK       (ANESTHESIA REQUEST YUEN);  Surgeon: Miguelito Galvan MD;  Location: [facility];  Service: General   • THYROIDECTOMY, PARTIAL  2000        PT Ortho     Row Name 10/26/22 1100       Subjective Comments    Subjective Comments Reports pain in calf and bottom of foot. Brusing on glutes from last session. Has had aching in calf.  -BB       Precautions and Contraindications    Precautions/Limitations no known precautions/limitations  -BB        Subjective Pain    Able to rate subjective pain? yes  -BB    Pre-Treatment Pain Level 6  -BB    Post-Treatment Pain Level 0  -BB       Posture/Observations    Posture/Observations Comments Left SI rotation noted- improved with MET today. Tender and taut TFL, lateral HS and peroneal longus and brevis.  -BB          User Key  (r) = Recorded By, (t) = Taken By, (c) = Cosigned By    Initials Name Provider Type    Vicky Gonzalez, PT DPT Physical Therapist                             PT Assessment/Plan     Row Name 10/26/22 1100          PT Assessment    Assessment Comments active trigger points in TFL with similar referell pattern of calf pain. Pin point tender with active trigger point of peroneal longus to foot and ankle noted. Improved pain in figure 4 sitting after treatment to peroneals but not TFL or HS this date.  -BB        PT Plan    PT Frequency 2x/week;1x/week  -BB     Predicted Duration of Therapy Intervention (PT) 6 weeks  -BB     PT Plan Comments monitor SI, ITB/TFL/peroneal  -BB           User Key  (r) = Recorded By, (t) = Taken By, (c) = Cosigned By    Initials Name Provider Type    Vicky Gonzalez, PT DPT Physical Therapist                   OP Exercises     Row Name 10/26/22 1100             Subjective Comments    Subjective Comments Reports pain in calf and bottom of foot. Brusing on glutes from last session. Has had aching in calf.  -BB         Subjective Pain    Able to rate subjective pain? yes  -BB      Pre-Treatment Pain Level 6  -BB      Post-Treatment Pain Level 0  -BB         Exercise 1    Exercise Name 1 MFR/pin and glides/asya/ glides to TFL and lateral HS  -BB      Time 1 25'  -BB         Exercise 2    Exercise Name 2 figure 4 position- pain in calf and knee  -BB         Exercise 3    Exercise Name 3 MET for SI rotation  -BB         Exercise 4    Exercise Name 4 TrP and MFR/glides to peroneals  -BB      Time 4 8'  -BB         Exercise 5    Exercise Name 5 figure 4 position  -BB       Additional Comments reported improved pain  -BB         Exercise 6    Exercise Name 6 gait- 50 ft  -BB      Additional Comments reported no pain  -BB         Exercise 7    Exercise Name 7 peroneal stretch sitting  -BB      Additional Comments HEP  -BB            User Key  (r) = Recorded By, (t) = Taken By, (c) = Cosigned By    Initials Name Provider Type    Vicky Gonzalez PT DPT Physical Therapist                              PT OP Goals     Row Name 10/26/22 1100          Long Term Goals    LTG Date to Achieve 11/14/22  -BB     LTG 1 No increased pain kneeling on left knee  -BB     LTG 1 Progress Not Met  -BB     LTG 2 hip abduction strength 4+/5  -BB     LTG 2 Progress Not Met  -BB     LTG 3 decrease ttp of soleus  -BB     LTG 3 Progress Met  -BB     LTG 4 complete a 2 mile job without increased pain  -BB     LTG 4 Progress Not Met  -BB        Time Calculation    PT Goal Re-Cert Due Date 11/14/22  -BB           User Key  (r) = Recorded By, (t) = Taken By, (c) = Cosigned By    Initials Name Provider Type    Vicky Gonzalez PT DPT Physical Therapist                               Time Calculation:   Start Time: 1104  Stop Time: 1150  Time Calculation (min): 46 min  Therapy Charges for Today     Code Description Service Date Service Provider Modifiers Qty    31548506438 HC PT MANUAL THERAPY EA 15 MIN 10/26/2022 Vicky Pickering PT DPT GP 3                    MARY CampT  10/26/2022

## 2022-10-31 ENCOUNTER — HOSPITAL ENCOUNTER (OUTPATIENT)
Dept: PHYSICAL THERAPY | Facility: HOSPITAL | Age: 52
Setting detail: THERAPIES SERIES
Discharge: HOME | End: 2022-10-31

## 2022-10-31 DIAGNOSIS — M25.552 LEFT HIP PAIN: ICD-10-CM

## 2022-10-31 DIAGNOSIS — M79.605 LEFT LEG PAIN: Primary | ICD-10-CM

## 2022-10-31 PROCEDURE — 97140 MANUAL THERAPY 1/> REGIONS: CPT | Performed by: PHYSICAL THERAPIST

## 2022-11-03 ENCOUNTER — HOSPITAL ENCOUNTER (OUTPATIENT)
Dept: PHYSICAL THERAPY | Facility: HOSPITAL | Age: 52
Setting detail: THERAPIES SERIES
Discharge: HOME | End: 2022-11-03

## 2022-11-03 DIAGNOSIS — M25.552 LEFT HIP PAIN: ICD-10-CM

## 2022-11-03 DIAGNOSIS — M79.605 LEFT LEG PAIN: Primary | ICD-10-CM

## 2022-11-03 PROCEDURE — 97140 MANUAL THERAPY 1/> REGIONS: CPT | Performed by: PHYSICAL THERAPIST

## 2022-11-03 NOTE — THERAPY TREATMENT NOTE
Outpatient Physical Therapy Ortho Treatment Note  [facility]     Patient Name: Vanessa Beach  : 1970  MRN: [id number]  Today's Date: 11/3/2022      Visit Date: 2022    Visit Dx:    ICD-10-CM ICD-9-CM   1. Left leg pain  M79.605 729.5   2. Left hip pain  M25.552 719.45       Patient Active Problem List   Diagnosis   • Plantar fasciitis   • Acquired hypothyroidism   • Soft tissue mass        Past Medical History:   Diagnosis Date   • Abnormal mammography    • Acquired hypothyroidism    • Acute bronchitis    • Acute otitis externa    • Allergic reaction     Allergic reaction to substance - Poison IVY      • Alopecia    • Aphthous ulcer of mouth    • Contact dermatitis due to plants, except food    • Cough    • Fever    • GERD (gastroesophageal reflux disease)    • Herpes zoster    • PONV (postoperative nausea and vomiting)    • Presbyopia    • Upper respiratory infection    • Vitamin D deficiency    • Wheezing         Past Surgical History:   Procedure Laterality Date   • APPENDECTOMY N/A 2021    Procedure: LAPAROSCOPIC  APPENDECTOMY;  Surgeon: Satnam Nina MD;  Location: [facility] OR;  Service: General;  Laterality: N/A;   • COLONOSCOPY N/A 3/12/2021    Procedure: COLONOSCOPY;  Surgeon: Ryan Baker DO;  Location: [facility] ENDOSCOPY;  Service: Gastroenterology;  Laterality: N/A;   • HYSTERECTOMY     • INJECTION OF MEDICATION  2014    KENALOG(2)   • LAPAROSCOPIC TUBAL LIGATION     • MASS EXCISION Right 2018    Procedure: EXCISE SOFT TISSUE MASS RIGHT BUTTOCK       (ANESTHESIA REQUEST YUEN);  Surgeon: Miguelito Galvan MD;  Location: [facility] OR;  Service: General   • THYROIDECTOMY, PARTIAL  2000        PT Ortho     Row Name 22 0800       Subjective Comments    Subjective Comments having pain in calf today. pain with walking. some ankle improvement from last session but felt best after initial  -BB       Precautions and Contraindications     "Precautions/Limitations no known precautions/limitations  -BB       Subjective Pain    Able to rate subjective pain? yes  -BB    Pre-Treatment Pain Level 5  -BB       Posture/Observations    Posture/Observations Comments tender in TFL with active trigger points noted.  -BB       Lumbar/SI Special Tests    Lumbar/SI Special Tests Comments anterior rotation noted  -BB          User Key  (r) = Recorded By, (t) = Taken By, (c) = Cosigned By    Initials Name Provider Type    Vicky Gonzalez, PT DPT Physical Therapist                             PT Assessment/Plan     Row Name 11/03/22 1657 11/03/22 0800       PT Assessment    Assessment Comments strong twitches noted of fibularis muscles. improved gait pattern but remained sore in lateral calf.  -BB Tender in TFL, treatment deferred to monitor response and pain improvement. Pain improves with SI correction  -BB       PT Plan    PT Frequency -- 2x/week;1x/week  -BB    PT Plan Comments -- monitor symptom to TFL vs fibualar muscles  -BB          User Key  (r) = Recorded By, (t) = Taken By, (c) = Cosigned By    Initials Name Provider Type    Vicky Goznalez, PT DPT Physical Therapist                   OP Exercises     Row Name 11/03/22 1657 11/03/22 0800          Subjective Comments    Subjective Comments pain remains, hurting in calfi  -BB having pain in calf today. pain with walking. some ankle improvement from last session but felt best after initial  -BB        Subjective Pain    Able to rate subjective pain? yes  -BB yes  -BB     Pre-Treatment Pain Level 5  -BB 5  -BB     Post-Treatment Pain Level -- --  \"better\"  -BB        Exercise 1    Exercise Name 1 dry needling to fibualris longus/brevis  -BB Left rotation and upslip  -BB     Time 1 10'  -BB 5'  -BB        Exercise 2    Exercise Name 2 MFR to fibualaris gentle  -BB MFR/TrP to TFL  -BB     Time 2 5'  -BB 30'  -BB        Exercise 3    Exercise Name 3 palpation exam  -BB time for clothes change  -BB     " "Additional Comments no HS ttp, ttp of lateral ITB at knee  -BB --        Exercise 4    Exercise Name 4 long arm distraction of hip  -BB --     Sets 4 3  -BB --     Time 4 30\"  -BB --           User Key  (r) = Recorded By, (t) = Taken By, (c) = Cosigned By    Initials Name Provider Type    Vicky Gonzalez, PT DPT Physical Therapist                              PT OP Goals     Row Name 11/03/22 0800          Long Term Goals    LTG Date to Achieve 11/14/22  -BB     LTG 1 No increased pain kneeling on left knee  -BB     LTG 1 Progress Not Met  -BB     LTG 2 hip abduction strength 4+/5  -BB     LTG 2 Progress Not Met  -BB     LTG 3 decrease ttp of soleus  -BB     LTG 3 Progress Met  -BB     LTG 4 complete a 2 mile job without increased pain  -BB     LTG 4 Progress Not Met  -BB        Time Calculation    PT Goal Re-Cert Due Date 11/14/22  -BB           User Key  (r) = Recorded By, (t) = Taken By, (c) = Cosigned By    Initials Name Provider Type    Vicky Gonzalez, PT DPT Physical Therapist                               Time Calculation:   Start Time: 1657  Stop Time: 1718  Time Calculation (min): 21 min  PT Non-Billable Time (min): 6 min  Therapy Charges for Today     Code Description Service Date Service Provider Modifiers Qty    10936451118 HC PT MANUAL THERAPY EA 15 MIN 11/3/2022 Vicky Pickering, PT DPT GP 3                    Vicky Pickering PT DPT  11/3/2022     "

## 2022-11-07 ENCOUNTER — HOSPITAL ENCOUNTER (OUTPATIENT)
Dept: PHYSICAL THERAPY | Facility: HOSPITAL | Age: 52
Setting detail: THERAPIES SERIES
Discharge: HOME | End: 2022-11-07

## 2022-11-07 DIAGNOSIS — M79.605 LEFT LEG PAIN: Primary | ICD-10-CM

## 2022-11-07 PROCEDURE — 97140 MANUAL THERAPY 1/> REGIONS: CPT | Performed by: PHYSICAL THERAPIST

## 2022-11-07 PROCEDURE — G0283 ELEC STIM OTHER THAN WOUND: HCPCS | Performed by: PHYSICAL THERAPIST

## 2022-11-07 NOTE — THERAPY TREATMENT NOTE
Outpatient Physical Therapy Ortho Treatment Note  [facility]     Patient Name: Vanessa Beach  : 1970  MRN: [id number]  Today's Date: 2022      Visit Date: 2022    Visit Dx:    ICD-10-CM ICD-9-CM   1. Left leg pain  M79.605 729.5       Patient Active Problem List   Diagnosis   • Plantar fasciitis   • Acquired hypothyroidism   • Soft tissue mass        Past Medical History:   Diagnosis Date   • Abnormal mammography    • Acquired hypothyroidism    • Acute bronchitis    • Acute otitis externa    • Allergic reaction     Allergic reaction to substance - Poison IVY      • Alopecia    • Aphthous ulcer of mouth    • Contact dermatitis due to plants, except food    • Cough    • Fever    • GERD (gastroesophageal reflux disease)    • Herpes zoster    • PONV (postoperative nausea and vomiting)    • Presbyopia    • Upper respiratory infection    • Vitamin D deficiency    • Wheezing         Past Surgical History:   Procedure Laterality Date   • APPENDECTOMY N/A 2021    Procedure: LAPAROSCOPIC  APPENDECTOMY;  Surgeon: Satnam Nina MD;  Location: [facility] OR;  Service: General;  Laterality: N/A;   • COLONOSCOPY N/A 3/12/2021    Procedure: COLONOSCOPY;  Surgeon: Ryan Baker DO;  Location: [facility] ENDOSCOPY;  Service: Gastroenterology;  Laterality: N/A;   • HYSTERECTOMY     • INJECTION OF MEDICATION  2014    KENALOG(2)   • LAPAROSCOPIC TUBAL LIGATION     • MASS EXCISION Right 2018    Procedure: EXCISE SOFT TISSUE MASS RIGHT BUTTOCK       (ANESTHESIA REQUEST YUEN);  Surgeon: Miguelito Galvan MD;  Location: [facility] OR;  Service: General   • THYROIDECTOMY, PARTIAL  2000        PT Ortho     Row Name 22 0800       Subjective Comments    Subjective Comments Had a lot of pain walking this weekend and was unable to complete planned activities due to pain and limping  -BB       Precautions and Contraindications    Precautions/Limitations no known precautions/limitations  -BB        Subjective Pain    Able to rate subjective pain? yes  -BB    Pre-Treatment Pain Level 4  -BB    Post-Treatment Pain Level --  'little better but still there  -BB       Posture/Observations    Posture/Observations Comments Tender soleus, FDL, peroneal  -BB          User Key  (r) = Recorded By, (t) = Taken By, (c) = Cosigned By    Initials Name Provider Type    BB Vicky Pickering, PT DPT Physical Therapist                             PT Assessment/Plan     Row Name 11/07/22 0800          PT Assessment    Assessment Comments Rereated pain a referral pattern with TrP to soleus and fibularis longus, noted tenderness of medial and lateral gastroc complex. Improved post pain reported but remained to be painful. Non painful with sitting position on leg that is otherwise painful.  -BB        PT Plan    PT Frequency 2x/week;1x/week  -BB     PT Plan Comments monitor SI. Continue manual treatment. Check DF strength next  -BB           User Key  (r) = Recorded By, (t) = Taken By, (c) = Cosigned By    Initials Name Provider Type    Vicky Gonzalez, PT DPT Physical Therapist                 Modalities     Row Name 11/07/22 0800             Ice    Ice Applied Yes  -BB      Location left calf  -BB      PT Ice Rx Minutes 10  -BB      Ice S/P Rx Yes  -BB         ELECTRICAL STIMULATION    Attended/Unattended Unattended  -BB      Stimulation Type IFC  -BB      Location/Electrode Placement/Other left calf with ice  -BB      PT E-Stim Unattended Minutes 10  -BB            User Key  (r) = Recorded By, (t) = Taken By, (c) = Cosigned By    Initials Name Provider Type    Vicky Gonzalez, PT DPT Physical Therapist               OP Exercises     Row Name 11/07/22 0800             Subjective Comments    Subjective Comments Had a lot of pain walking this weekend and was unable to complete planned activities due to pain and limping  -BB         Subjective Pain    Able to rate subjective pain? yes  -BB      Pre-Treatment Pain Level 4  -BB       Post-Treatment Pain Level --  'little better but still there  -BB         Exercise 1    Exercise Name 1 corrected for a left SI rotation  -BB      Time 1 2'  -BB         Exercise 2    Exercise Name 2 MFR, glides, pin and glides, Cha, trp holds to gastroc complex, soleus, and FDL  -BB      Time 2 32'  -BB         Exercise 3    Exercise Name 3 IFC with ice to calf  -BB            User Key  (r) = Recorded By, (t) = Taken By, (c) = Cosigned By    Initials Name Provider Type    Vicky Gonzalez PT DPT Physical Therapist                              PT OP Goals     Row Name 11/07/22 0800          Long Term Goals    LTG Date to Achieve 11/14/22  -BB     LTG 1 No increased pain kneeling on left knee  -BB     LTG 1 Progress Not Met  -BB     LTG 2 hip abduction strength 4+/5  -BB     LTG 2 Progress Not Met  -BB     LTG 3 decrease ttp of soleus  -BB     LTG 3 Progress Met  -BB     LTG 4 complete a 2 mile job without increased pain  -BB     LTG 4 Progress Not Met  -BB        Time Calculation    PT Goal Re-Cert Due Date 11/14/22  -BB           User Key  (r) = Recorded By, (t) = Taken By, (c) = Cosigned By    Initials Name Provider Type    Vicky Gonzalez PT DPT Physical Therapist                               Time Calculation:   Start Time: 0805  Stop Time: 0855  Time Calculation (min): 50 min  Untimed Charges  PT E-Stim Unattended Minutes: 10  PT Ice Rx Minutes: 10  Total Minutes  Untimed Charges Total Minutes: 20   Total Minutes: 20  Therapy Charges for Today     Code Description Service Date Service Provider Modifiers Qty    88991038214 HC PT ELECTRICAL STIM UNATTENDED 11/7/2022 Vicky Pickering PT DPT  1    74043909327 HC PT MANUAL THERAPY EA 15 MIN 11/7/2022 Vicky Pickering PT DPT GP 2                    Vicky Pickering PT DPT  11/7/2022

## 2022-11-08 DIAGNOSIS — M79.605 LEFT LEG PAIN: Primary | ICD-10-CM

## 2022-11-09 ENCOUNTER — HOSPITAL ENCOUNTER (OUTPATIENT)
Dept: PHYSICAL THERAPY | Facility: HOSPITAL | Age: 52
Setting detail: THERAPIES SERIES
Discharge: HOME | End: 2022-11-09

## 2022-11-09 DIAGNOSIS — M79.605 LEFT LEG PAIN: Primary | ICD-10-CM

## 2022-11-09 PROCEDURE — 97140 MANUAL THERAPY 1/> REGIONS: CPT | Performed by: PHYSICAL THERAPIST

## 2022-11-09 NOTE — THERAPY TREATMENT NOTE
Outpatient Physical Therapy Ortho Treatment Note  [facility]     Patient Name: Vanessa Beach  : 1970  MRN: [id number]  Today's Date: 2022      Visit Date: 2022    Visit Dx:    ICD-10-CM ICD-9-CM   1. Left leg pain  M79.605 729.5       Patient Active Problem List   Diagnosis   • Plantar fasciitis   • Acquired hypothyroidism   • Soft tissue mass        Past Medical History:   Diagnosis Date   • Abnormal mammography    • Acquired hypothyroidism    • Acute bronchitis    • Acute otitis externa    • Allergic reaction     Allergic reaction to substance - Poison IVY      • Alopecia    • Aphthous ulcer of mouth    • Contact dermatitis due to plants, except food    • Cough    • Fever    • GERD (gastroesophageal reflux disease)    • Herpes zoster    • PONV (postoperative nausea and vomiting)    • Presbyopia    • Upper respiratory infection    • Vitamin D deficiency    • Wheezing         Past Surgical History:   Procedure Laterality Date   • APPENDECTOMY N/A 2021    Procedure: LAPAROSCOPIC  APPENDECTOMY;  Surgeon: Satnam Nina MD;  Location: [facility] OR;  Service: General;  Laterality: N/A;   • COLONOSCOPY N/A 3/12/2021    Procedure: COLONOSCOPY;  Surgeon: Ryan Baker DO;  Location: [facility] ENDOSCOPY;  Service: Gastroenterology;  Laterality: N/A;   • HYSTERECTOMY     • INJECTION OF MEDICATION  2014    KENALOG(2)   • LAPAROSCOPIC TUBAL LIGATION     • MASS EXCISION Right 2018    Procedure: EXCISE SOFT TISSUE MASS RIGHT BUTTOCK       (ANESTHESIA REQUEST YUEN);  Surgeon: Miguelito Galvan MD;  Location: [facility];  Service: General   • THYROIDECTOMY, PARTIAL  2000        PT Ortho     Row Name 22 0845       Subjective Comments    Subjective Comments reports some improvements after last session. Pain with walking long distances  -BB       Precautions and Contraindications    Precautions/Limitations no known precautions/limitations  -BB       Subjective Pain    Able  to rate subjective pain? yes  -BB    Pre-Treatment Pain Level --  its there  -BB    Post-Treatment Pain Level 0  feels good  -BB       Posture/Observations    Posture/Observations Comments increased foot slap with gait but strong 5/5 DF. Cues to improve heel to toe gait pattern  -BB          User Key  (r) = Recorded By, (t) = Taken By, (c) = Cosigned By    Initials Name Provider Type    Vicky Gonzalez, PT DPT Physical Therapist                             PT Assessment/Plan     Row Name 11/09/22 0388          PT Assessment    Assessment Comments Taut, tender and knotty of medial, lateral and posterior compartments. Tender of Soleus with recreation of symptoms. Good response to treatment this date. Noted tendon popping back of knee with flexion to extension-suspect HS lateral head  -BB        PT Plan    PT Frequency 2x/week;1x/week  -BB     PT Plan Comments continue treatment to soleus, HS MFR next  -BB           User Key  (r) = Recorded By, (t) = Taken By, (c) = Cosigned By    Initials Name Provider Type    Vicky Gonzalez, PT DPT Physical Therapist                   OP Exercises     Row Name 11/09/22 3096             Subjective Comments    Subjective Comments reports some improvements after last session. Pain with walking long distances  -BB         Subjective Pain    Able to rate subjective pain? yes  -BB      Pre-Treatment Pain Level --  its there  -BB      Post-Treatment Pain Level 0  feels good  -BB         Exercise 1    Exercise Name 1 MFR, glides, efflurage, skin rolling to medial, posterior and lateral leg compartments and to HS  -BB      Time 1 40'  -BB         Exercise 2    Exercise Name 2 Soleus and gastroc stretch  -BB      Additional Comments HEP  -BB            User Key  (r) = Recorded By, (t) = Taken By, (c) = Cosigned By    Initials Name Provider Type    Vicky Gonzalez, PT DPT Physical Therapist                              PT OP Goals     Row Name 11/09/22 1286          Long Term Goals     LTG Date to Achieve 11/14/22  -BB     LTG 1 No increased pain kneeling on left knee  -BB     LTG 1 Progress Not Met  -BB     LTG 2 hip abduction strength 4+/5  -BB     LTG 2 Progress Not Met  -BB     LTG 3 decrease ttp of soleus  -BB     LTG 3 Progress Met  -BB     LTG 4 complete a 2 mile job without increased pain  -BB     LTG 4 Progress Not Met  -BB        Time Calculation    PT Goal Re-Cert Due Date 11/14/22  -BB           User Key  (r) = Recorded By, (t) = Taken By, (c) = Cosigned By    Initials Name Provider Type    Vicky Gonzalez, PT DPT Physical Therapist                               Time Calculation:   Start Time: 0848  Stop Time: 0930  Time Calculation (min): 42 min  Therapy Charges for Today     Code Description Service Date Service Provider Modifiers Qty    03804112714 HC PT MANUAL THERAPY EA 15 MIN 11/9/2022 Vicky Pickering, PT DPT GP 3                    Vicky Pickering PT DPT  11/9/2022

## 2022-11-14 ENCOUNTER — HOSPITAL ENCOUNTER (OUTPATIENT)
Dept: PHYSICAL THERAPY | Facility: HOSPITAL | Age: 52
Setting detail: THERAPIES SERIES
Discharge: HOME | End: 2022-11-14

## 2022-11-14 DIAGNOSIS — M79.605 LEFT LEG PAIN: Primary | ICD-10-CM

## 2022-11-14 DIAGNOSIS — M25.552 LEFT HIP PAIN: ICD-10-CM

## 2022-11-14 PROCEDURE — 97140 MANUAL THERAPY 1/> REGIONS: CPT | Performed by: PHYSICAL THERAPIST

## 2022-11-14 NOTE — THERAPY TREATMENT NOTE
Outpatient Physical Therapy Ortho Treatment Note  [facility]     Patient Name: Vanessa Beach  : 1970  MRN: [id number]  Today's Date: 2022      Visit Date: 2022    Visit Dx:    ICD-10-CM ICD-9-CM   1. Left leg pain  M79.605 729.5   2. Left hip pain  M25.552 719.45       Patient Active Problem List   Diagnosis   • Plantar fasciitis   • Acquired hypothyroidism   • Soft tissue mass        Past Medical History:   Diagnosis Date   • Abnormal mammography    • Acquired hypothyroidism    • Acute bronchitis    • Acute otitis externa    • Allergic reaction     Allergic reaction to substance - Poison IVY      • Alopecia    • Aphthous ulcer of mouth    • Contact dermatitis due to plants, except food    • Cough    • Fever    • GERD (gastroesophageal reflux disease)    • Herpes zoster    • PONV (postoperative nausea and vomiting)    • Presbyopia    • Upper respiratory infection    • Vitamin D deficiency    • Wheezing         Past Surgical History:   Procedure Laterality Date   • APPENDECTOMY N/A 2021    Procedure: LAPAROSCOPIC  APPENDECTOMY;  Surgeon: Satnam Nina MD;  Location: [facility] OR;  Service: General;  Laterality: N/A;   • COLONOSCOPY N/A 3/12/2021    Procedure: COLONOSCOPY;  Surgeon: Ryan Baker DO;  Location: [facility] ENDOSCOPY;  Service: Gastroenterology;  Laterality: N/A;   • HYSTERECTOMY     • INJECTION OF MEDICATION  2014    KENALOG(2)   • LAPAROSCOPIC TUBAL LIGATION     • MASS EXCISION Right 2018    Procedure: EXCISE SOFT TISSUE MASS RIGHT BUTTOCK       (ANESTHESIA REQUEST YUEN);  Surgeon: Miguelito Galvan MD;  Location: [facility];  Service: General   • THYROIDECTOMY, PARTIAL  2000        PT Ortho     Row Name 22 0805       Precautions and Contraindications    Precautions/Limitations no known precautions/limitations  -BB       Posture/Observations    Posture/Observations Comments intermittent foot slap but improves with cues.  -BB          User  Key  (r) = Recorded By, (t) = Taken By, (c) = Cosigned By    Initials Name Provider Type    Vicky Gonzalez PT DPT Physical Therapist                             PT Assessment/Plan     Row Name 11/14/22 0805          PT Assessment    Assessment Comments Tender of gastroc complex, tender popliteal fossa, tender of fibularis and anterior tib. Improves pain with manual  -BB        PT Plan    PT Frequency 2x/week;1x/week  -BB     PT Plan Comments continue manual, stretching, ankle strength HEP next  -BB           User Key  (r) = Recorded By, (t) = Taken By, (c) = Cosigned By    Initials Name Provider Type    Vicky Gonzalez, PT DPT Physical Therapist                   OP Exercises     Row Name 11/14/22 0805             Subjective Comments    Subjective Comments Reports pain was good for two days after last session but started again on Saturday when crossed leg in lap and had a pop. Pain was there but not painful to bear wt which was still an improvement. Pain in knee present  -BB         Subjective Pain    Able to rate subjective pain? yes  -BB      Pre-Treatment Pain Level 5  -BB      Post-Treatment Pain Level 0  -BB         Exercise 1    Exercise Name 1 MFR, glides, pin and glide to posterior/med and lateral leg  -BB      Time 1 45'  -BB            User Key  (r) = Recorded By, (t) = Taken By, (c) = Cosigned By    Initials Name Provider Type    Vicky Gonzalez PT DPT Physical Therapist                              PT OP Goals     Row Name 11/14/22 0805          Long Term Goals    LTG Date to Achieve 11/14/22  -BB     LTG 1 No increased pain kneeling on left knee  -BB     LTG 1 Progress Not Met  -BB     LTG 2 hip abduction strength 4+/5  -BB     LTG 2 Progress Not Met  -BB     LTG 3 decrease ttp of soleus  -BB     LTG 3 Progress Met  -BB     LTG 4 complete a 2 mile job without increased pain  -BB     LTG 4 Progress Not Met  -BB        Time Calculation    PT Goal Re-Cert Due Date 11/14/22  -BB           User Key   (r) = Recorded By, (t) = Taken By, (c) = Cosigned By    Initials Name Provider Type    BB Vicky Pickering, PT DPT Physical Therapist                               Time Calculation:   Start Time: 0805  Stop Time: 0850  Time Calculation (min): 45 min  Therapy Charges for Today     Code Description Service Date Service Provider Modifiers Qty    40160987063 HC PT MANUAL THERAPY EA 15 MIN 11/14/2022 Vicky Pickering, PT DPT GP 3                    Vicky Pickering PT DPT  11/14/2022

## 2022-11-15 ENCOUNTER — LAB (OUTPATIENT)
Dept: LAB | Facility: HOSPITAL | Age: 52
End: 2022-11-15

## 2022-11-15 DIAGNOSIS — Z00.00 ANNUAL PHYSICAL EXAM: ICD-10-CM

## 2022-11-15 LAB
ALBUMIN SERPL-MCNC: 4.6 G/DL (ref 3.5–5.2)
ALBUMIN/GLOB SERPL: 2.4 G/DL
ALP SERPL-CCNC: 42 U/L (ref 39–117)
ALT SERPL W P-5'-P-CCNC: 11 U/L (ref 1–33)
ANION GAP SERPL CALCULATED.3IONS-SCNC: 11 MMOL/L (ref 5–15)
AST SERPL-CCNC: 19 U/L (ref 1–32)
BILIRUB SERPL-MCNC: 0.4 MG/DL (ref 0–1.2)
BUN SERPL-MCNC: 11 MG/DL (ref 6–20)
BUN/CREAT SERPL: 20 (ref 7–25)
CALCIUM SPEC-SCNC: 9.2 MG/DL (ref 8.6–10.5)
CHLORIDE SERPL-SCNC: 100 MMOL/L (ref 98–107)
CHOLEST SERPL-MCNC: 216 MG/DL (ref 0–200)
CO2 SERPL-SCNC: 25 MMOL/L (ref 22–29)
CREAT SERPL-MCNC: 0.55 MG/DL (ref 0.57–1)
EGFRCR SERPLBLD CKD-EPI 2021: 110.4 ML/MIN/1.73
GLOBULIN UR ELPH-MCNC: 1.9 GM/DL
GLUCOSE SERPL-MCNC: 86 MG/DL (ref 65–99)
HDLC SERPL-MCNC: 92 MG/DL (ref 40–60)
LDLC SERPL CALC-MCNC: 116 MG/DL (ref 0–100)
LDLC/HDLC SERPL: 1.26 {RATIO}
POTASSIUM SERPL-SCNC: 4.2 MMOL/L (ref 3.5–5.2)
PROT SERPL-MCNC: 6.5 G/DL (ref 6–8.5)
SODIUM SERPL-SCNC: 136 MMOL/L (ref 136–145)
TRIGL SERPL-MCNC: 42 MG/DL (ref 0–150)
TSH SERPL DL<=0.05 MIU/L-ACNC: 1 UIU/ML (ref 0.27–4.2)
VLDLC SERPL-MCNC: 8 MG/DL (ref 5–40)

## 2022-11-15 PROCEDURE — 81003 URINALYSIS AUTO W/O SCOPE: CPT

## 2022-11-15 PROCEDURE — 84443 ASSAY THYROID STIM HORMONE: CPT

## 2022-11-15 PROCEDURE — 36415 COLL VENOUS BLD VENIPUNCTURE: CPT

## 2022-11-15 PROCEDURE — 80061 LIPID PANEL: CPT

## 2022-11-15 PROCEDURE — 80053 COMPREHEN METABOLIC PANEL: CPT

## 2022-11-16 ENCOUNTER — HOSPITAL ENCOUNTER (OUTPATIENT)
Dept: PHYSICAL THERAPY | Facility: HOSPITAL | Age: 52
Setting detail: THERAPIES SERIES
Discharge: HOME | End: 2022-11-16

## 2022-11-16 DIAGNOSIS — M25.552 LEFT HIP PAIN: ICD-10-CM

## 2022-11-16 DIAGNOSIS — M79.605 LEFT LEG PAIN: Primary | ICD-10-CM

## 2022-11-16 LAB
BILIRUB UR QL STRIP: NEGATIVE
CLARITY UR: CLEAR
COLOR UR: YELLOW
GLUCOSE UR STRIP-MCNC: NEGATIVE MG/DL
HGB UR QL STRIP.AUTO: NEGATIVE
KETONES UR QL STRIP: NEGATIVE
LEUKOCYTE ESTERASE UR QL STRIP.AUTO: NEGATIVE
NITRITE UR QL STRIP: NEGATIVE
PH UR STRIP.AUTO: 7 [PH] (ref 5–8)
PROT UR QL STRIP: NEGATIVE
SP GR UR STRIP: 1.01 (ref 1–1.03)
UROBILINOGEN UR QL STRIP: NORMAL

## 2022-11-16 PROCEDURE — 97140 MANUAL THERAPY 1/> REGIONS: CPT | Performed by: PHYSICAL THERAPIST

## 2022-11-16 NOTE — THERAPY PROGRESS REPORT/RE-CERT
Outpatient Physical Therapy Ortho Progress Note  [facility]     Patient Name: Vanessa Beach  : 1970  MRN: [id number]  Today's Date: 2022      Visit Date: 2022    Visit Dx:    ICD-10-CM ICD-9-CM   1. Left leg pain  M79.605 729.5   2. Left hip pain  M25.552 719.45       Patient Active Problem List   Diagnosis   • Plantar fasciitis   • Acquired hypothyroidism   • Soft tissue mass        Past Medical History:   Diagnosis Date   • Abnormal mammography    • Acquired hypothyroidism    • Acute bronchitis    • Acute otitis externa    • Allergic reaction     Allergic reaction to substance - Poison IVY      • Alopecia    • Aphthous ulcer of mouth    • Contact dermatitis due to plants, except food    • Cough    • Fever    • GERD (gastroesophageal reflux disease)    • Herpes zoster    • PONV (postoperative nausea and vomiting)    • Presbyopia    • Upper respiratory infection    • Vitamin D deficiency    • Wheezing         Past Surgical History:   Procedure Laterality Date   • APPENDECTOMY N/A 2021    Procedure: LAPAROSCOPIC  APPENDECTOMY;  Surgeon: Satnam Nina MD;  Location: [facility] OR;  Service: General;  Laterality: N/A;   • COLONOSCOPY N/A 3/12/2021    Procedure: COLONOSCOPY;  Surgeon: Ryan Baker DO;  Location: [facility] ENDOSCOPY;  Service: Gastroenterology;  Laterality: N/A;   • HYSTERECTOMY     • INJECTION OF MEDICATION  2014    KENALOG(2)   • LAPAROSCOPIC TUBAL LIGATION     • MASS EXCISION Right 2018    Procedure: EXCISE SOFT TISSUE MASS RIGHT BUTTOCK       (ANESTHESIA REQUEST YUEN);  Surgeon: Miguelito Galvan MD;  Location: [facility] OR;  Service: General   • THYROIDECTOMY, PARTIAL  2000        PT Ortho     Row Name 22 0800       Subjective Comments    Subjective Comments Had pain yesterday and today. Pain is better than inital but remains. Seeing ortho end of the week  -BB       Precautions and Contraindications    Precautions/Limitations no known  precautions/limitations  -BB       Subjective Pain    Able to rate subjective pain? yes  -BB    Pre-Treatment Pain Level 4  -BB    Post-Treatment Pain Level 0  -BB          User Key  (r) = Recorded By, (t) = Taken By, (c) = Cosigned By    Initials Name Provider Type    Vicky Gonzalez, PT DPT Physical Therapist                             PT Assessment/Plan     Row Name 11/16/22 0800          PT Assessment    Functional Limitations Performance in leisure activities;Impaired gait  -BB     Impairments Gait;Pain;Muscle strength;Poor body mechanics;Impaired muscle endurance  -BB     Assessment Comments Patient presents with continued pain that improves with treatment but returns. Is noted to radiate intermittently up and down. Is noted to have significant tenderness with radicular symptoms of soleus and fibularis longus muscle belly. Could continue to benefit from manual to improve pain and tissue tendnerness of leg.  -BB     Rehab Potential Good  -BB     Patient/caregiver participated in establishment of treatment plan and goals Yes  -BB     Patient would benefit from skilled therapy intervention Yes  -BB        PT Plan    PT Frequency 2x/week;1x/week  -BB     Predicted Duration of Therapy Intervention (PT) 4-6 weeks  -BB     PT Plan Comments continue manual, stretching, strength, gait, balance, dry needling  -BB           User Key  (r) = Recorded By, (t) = Taken By, (c) = Cosigned By    Initials Name Provider Type    Vicky Gonzalez PT DPT Physical Therapist                   OP Exercises     Row Name 11/16/22 0800             Subjective Comments    Subjective Comments Had pain yesterday and today. Pain is better than inital but remains. Seeing ortho end of the week  -BB         Subjective Pain    Able to rate subjective pain? yes  -BB      Pre-Treatment Pain Level 4  -BB      Post-Treatment Pain Level 0  -BB         Exercise 1    Exercise Name 1 MFR, glides, pin and glide to posterior/med and lateral leg  -BB       Time 1 40  -BB      Additional Comments souleus, gastroc, fibularis  -BB            User Key  (r) = Recorded By, (t) = Taken By, (c) = Cosigned By    Initials Name Provider Type    Vicky Gonzalez, PT DPT Physical Therapist                              PT OP Goals     Row Name 11/16/22 0800          Long Term Goals    LTG Date to Achieve 12/08/22  -BB     LTG 1 No increased pain kneeling on left knee  -BB     LTG 1 Progress Not Met  -BB     LTG 2 hip abduction strength 4+/5  -BB     LTG 2 Progress Not Met  -BB     LTG 3 decrease ttp of soleus  -BB     LTG 3 Progress Met  -BB     LTG 4 complete a 2 mile job without increased pain  -BB     LTG 4 Progress Not Met  -BB        Time Calculation    PT Goal Re-Cert Due Date 12/08/22  -BB           User Key  (r) = Recorded By, (t) = Taken By, (c) = Cosigned By    Initials Name Provider Type    Vicky Gonzalez, PT DPT Physical Therapist                               Time Calculation:   Start Time: 0802  Stop Time: 0845  Time Calculation (min): 43 min  Therapy Charges for Today     Code Description Service Date Service Provider Modifiers Qty    40463237386 HC PT MANUAL THERAPY EA 15 MIN 11/16/2022 Vicky Pickering, PT DPT GP 3                    Vicky Pickering PT DPT  11/16/2022

## 2022-11-17 ENCOUNTER — OFFICE VISIT (OUTPATIENT)
Dept: FAMILY MEDICINE CLINIC | Facility: CLINIC | Age: 52
End: 2022-11-17

## 2022-11-17 VITALS
SYSTOLIC BLOOD PRESSURE: 110 MMHG | BODY MASS INDEX: 23.49 KG/M2 | WEIGHT: 141 LBS | HEART RATE: 80 BPM | OXYGEN SATURATION: 99 % | HEIGHT: 65 IN | DIASTOLIC BLOOD PRESSURE: 70 MMHG

## 2022-11-17 DIAGNOSIS — Z00.00 ANNUAL PHYSICAL EXAM: Primary | ICD-10-CM

## 2022-11-17 DIAGNOSIS — Z11.59 NEED FOR HEPATITIS C SCREENING TEST: ICD-10-CM

## 2022-11-17 DIAGNOSIS — E03.9 ACQUIRED HYPOTHYROIDISM: Chronic | ICD-10-CM

## 2022-11-17 PROCEDURE — 99396 PREV VISIT EST AGE 40-64: CPT | Performed by: GENERAL PRACTICE

## 2022-11-17 RX ORDER — LEVOTHYROXINE SODIUM 88 UG/1
88 TABLET ORAL DAILY
Qty: 90 TABLET | Refills: 3 | Status: SHIPPED | OUTPATIENT
Start: 2022-11-17

## 2022-11-18 ENCOUNTER — OFFICE VISIT (OUTPATIENT)
Dept: ORTHOPEDIC SURGERY | Facility: CLINIC | Age: 52
End: 2022-11-18

## 2022-11-18 VITALS — WEIGHT: 143.7 LBS | BODY MASS INDEX: 23.94 KG/M2 | HEIGHT: 65 IN

## 2022-11-18 DIAGNOSIS — M79.605 LEFT LEG PAIN: Primary | ICD-10-CM

## 2022-11-18 DIAGNOSIS — M79.662 PAIN OF LEFT CALF: ICD-10-CM

## 2022-11-18 DIAGNOSIS — M23.92 INTERNAL DERANGEMENT OF LEFT KNEE: ICD-10-CM

## 2022-11-18 PROCEDURE — 20610 DRAIN/INJ JOINT/BURSA W/O US: CPT | Performed by: NURSE PRACTITIONER

## 2022-11-18 PROCEDURE — 99213 OFFICE O/P EST LOW 20 MIN: CPT | Performed by: NURSE PRACTITIONER

## 2022-11-18 RX ORDER — VIT C/B6/B5/MAGNESIUM/HERB 173 50-5-6-5MG
CAPSULE ORAL
COMMUNITY

## 2022-11-18 RX ORDER — BUPIVACAINE HYDROCHLORIDE 5 MG/ML
2 INJECTION, SOLUTION PERINEURAL
Status: COMPLETED | OUTPATIENT
Start: 2022-11-18 | End: 2022-11-18

## 2022-11-18 RX ORDER — TRIAMCINOLONE ACETONIDE 40 MG/ML
40 INJECTION, SUSPENSION INTRA-ARTICULAR; INTRAMUSCULAR
Status: COMPLETED | OUTPATIENT
Start: 2022-11-18 | End: 2022-11-18

## 2022-11-18 RX ADMIN — TRIAMCINOLONE ACETONIDE 40 MG: 40 INJECTION, SUSPENSION INTRA-ARTICULAR; INTRAMUSCULAR at 11:16

## 2022-11-18 RX ADMIN — BUPIVACAINE HYDROCHLORIDE 2 ML: 5 INJECTION, SOLUTION PERINEURAL at 11:16

## 2022-11-18 NOTE — PROGRESS NOTES
Vanessa Beach is a 52 y.o. female   Primary provider:  Meghana Connolly MD       Chief Complaint   Patient presents with   • Left Lower Leg - Initial Evaluation, Pain       HISTORY OF PRESENT ILLNESS:      Mrs. Beach is a 52-year-old female who presents today with complaints of left knee and left calf pain.  Symptoms have been present for the past 6 months.  No specific injury.  Patient is a marathon runner but has not been able to run for over the past month.  Patient reports even walking is causing her to limp.  She describes her pain as moderate to severe, intermittent.  Pain is stabbing and aching.  She has tried Celebrex, formal physical therapy, RICE therapy, activity modification without relief of symptoms.    Calf pain has been present for longest.  Calf pain is deep and achy.  Calf pain does not respond to most interventions which normally help with muscle pain.      Knee pain has also been present for the past few months.  She reports painful popping along with catching.  Pain is mostly in lateral knee.  She has difficulty squatting.    She denies burning, tingling, numbness.    Leg Pain   Incident onset: 6 months  There was no injury mechanism. The pain is present in the left leg. The quality of the pain is described as stabbing and aching. The pain is at a severity of 4/10. The pain is severe. The pain has been intermittent since onset. The symptoms are aggravated by weight bearing and movement. She has tried NSAIDs for the symptoms.        CONCURRENT MEDICAL HISTORY:    Past Medical History:   Diagnosis Date   • Abnormal mammography    • Acquired hypothyroidism    • Acute bronchitis    • Acute otitis externa    • Allergic reaction     Allergic reaction to substance - Poison IVY      • Alopecia    • Aphthous ulcer of mouth    • Contact dermatitis due to plants, except food    • Cough    • Fever    • GERD (gastroesophageal reflux disease)    • Herpes zoster    • PONV (postoperative nausea and  vomiting)    • Presbyopia    • Upper respiratory infection    • Vitamin D deficiency    • Wheezing        Allergies   Allergen Reactions   • Minocin [Minocycline Hcl] Other (See Comments)     Skin coloration changes         Current Outpatient Medications:   •  levothyroxine (SYNTHROID, LEVOTHROID) 88 MCG tablet, Take 1 tablet by mouth Daily., Disp: 90 tablet, Rfl: 3  •  magnesium oxide (MAG-OX) 400 MG tablet, Take 400 mg by mouth Daily., Disp: , Rfl:   •  Turmeric 500 MG capsule, Take  by mouth., Disp: , Rfl:   •  ACETAMINOPHEN PO, Take 500 mg by mouth Every 4 (Four) Hours As Needed., Disp: , Rfl:   •  celecoxib (CeleBREX) 200 MG capsule, Take 1 capsule by mouth Daily As Needed for Moderate Pain ., Disp: 30 capsule, Rfl: 2  •  Cholecalciferol 1000 UNITS tablet, Take 1,000 Units by mouth daily., Disp: , Rfl:   •  omeprazole (priLOSEC) 20 MG capsule, Take 20 mg by mouth Daily. prn, Disp: , Rfl:   •  valACYclovir (VALTREX) 1000 MG tablet, Take 1 tablet by mouth As Needed (fever blister)., Disp: 30 tablet, Rfl: 0    Past Surgical History:   Procedure Laterality Date   • APPENDECTOMY N/A 7/27/2021    Procedure: LAPAROSCOPIC  APPENDECTOMY;  Surgeon: Satnam Nina MD;  Location: [facility] OR;  Service: General;  Laterality: N/A;   • COLONOSCOPY N/A 3/12/2021    Procedure: COLONOSCOPY;  Surgeon: Ryan Baker DO;  Location: [facility] ENDOSCOPY;  Service: Gastroenterology;  Laterality: N/A;   • HYSTERECTOMY     • INJECTION OF MEDICATION  12/08/2014    KENALOG(2)   • LAPAROSCOPIC TUBAL LIGATION     • MASS EXCISION Right 5/8/2018    Procedure: EXCISE SOFT TISSUE MASS RIGHT BUTTOCK       (ANESTHESIA REQUEST YUEN);  Surgeon: Miguelito Galvan MD;  Location: [facility] OR;  Service: General   • THYROIDECTOMY, PARTIAL  02/07/2000       Family History   Problem Relation Age of Onset   • Uterine cancer Mother    • Coronary artery disease Mother    • Hypertension Mother    • Cancer Mother    • Diabetes Mother    • Bleeding Disorder  "Mother    • Depression Father        Social History     Socioeconomic History   • Marital status:    Tobacco Use   • Smoking status: Never   • Smokeless tobacco: Never   Vaping Use   • Vaping Use: Never used   Substance and Sexual Activity   • Alcohol use: Yes     Comment: occasional   • Drug use: No   • Sexual activity: Yes     Partners: Male        Review of Systems     Left knee and left calf pain.    PHYSICAL EXAMINATION:       Ht 165.1 cm (65\")   Wt 65.2 kg (143 lb 11.2 oz)   LMP  (LMP Unknown)   BMI 23.91 kg/m²     Physical Exam  Vitals and nursing note reviewed.   Constitutional:       General: She is not in acute distress.     Appearance: She is well-developed. She is not toxic-appearing.   HENT:      Head: Normocephalic.   Pulmonary:      Effort: Pulmonary effort is normal. No respiratory distress.   Musculoskeletal:      Left knee:      Instability Tests: Lateral Gretta test positive. Medial Gretta test negative.   Skin:     General: Skin is warm and dry.   Neurological:      Mental Status: She is alert and oriented to person, place, and time.   Psychiatric:         Behavior: Behavior normal.         Thought Content: Thought content normal.         Judgment: Judgment normal.         GAIT:     []  Normal  []  Antalgic    Assistive device: []  None  []  Walker     []  Crutches  []  Cane     []  Wheelchair  []  Stretcher    Left Knee Exam     Tenderness   The patient is experiencing tenderness in the lateral joint line.    Range of Motion   Extension: 0   Flexion: 140     Tests   Gretta:  Medial - negative Lateral - positive  Varus: negative Valgus: negative  Drawer:  Anterior - negative     Posterior - negative    Other   Erythema: absent  Sensation: normal  Pulse: present  Swelling: none    Comments:  Mild pain with arc of motion.  No evidence of infection.      Calf is soft and nontender.  No evidence of DVT or infection.  Distal pulses are intact.  Patient has tenderness with deep " palpation of calf muscle.                          ASSESSMENT:    Diagnoses and all orders for this visit:    Left leg pain  -     XR Knee 1 or 2 View Left    Pain of left calf  -     MRI Tibia Fibula Right Without Contrast; Future    Internal derangement of left knee  -     MRI Knee Left Without Contrast; Future    Other orders  -     Turmeric 500 MG capsule; Take  by mouth.  -     Large Joint Arthrocentesis: L knee          PLAN    Large Joint Arthrocentesis: L knee  Date/Time: 11/18/2022 11:16 AM  Consent given by: patient  Site marked: site marked  Timeout: Immediately prior to procedure a time out was called to verify the correct patient, procedure, equipment, support staff and site/side marked as required   Supporting Documentation  Indications: pain   Procedure Details  Location: knee - L knee  Preparation: Patient was prepped and draped in the usual sterile fashion  Needle size: 22 G  Approach: anteromedial  Medications administered: 40 mg triamcinolone acetonide 40 MG/ML; 2 mL bupivacaine 0.5 %  Patient tolerance: patient tolerated the procedure well with no immediate complications          Calf pain has persisted despite a multitude of conservative management including multiple visits of formal physical therapy, prescription strength medications, OTC medications, rest etc.  Knee on exam has findings to suggest possible meniscal pathology which could explain some of the mechanical symptoms patient complains of.  Recommend proceeding with MRIs at this time for further investigation.  In interim patient is to continue physical therapy, Celebrex, activity modification, RICE therapy, and weightbearing modification as needed.      Recommend the following:    -Rest and activity modification as tolerated and based on pain.  -Modified weightbearing of the affected extremity with use of a cane or walker as needed.  -Gradual progression of weightbearing and activity as pain and swelling allow.  -Conditioning and  strengthening exercises of the bilateral knees/legs.  -Elevation and ice therapy to the affected knee to minimize pain/swelling/inflammation.             Return for MRI results .    EMR Dragon/Transciption Disclaimer: Some of this note may be an electronic transcription/translation of spoken language to printed text.  The electronic translation of spoken language may permit erroneous, or at times, nonsensical words or phrases to be inadvertently transcribed. Although I have reviewed the note for such errors, some may still exist.       This document has been electronically signed by Aleja MARSHALL on November 18, 2022 12:21 CST

## 2022-11-21 ENCOUNTER — APPOINTMENT (OUTPATIENT)
Dept: PHYSICAL THERAPY | Facility: HOSPITAL | Age: 52
End: 2022-11-21

## 2022-11-22 ENCOUNTER — HOSPITAL ENCOUNTER (OUTPATIENT)
Dept: PHYSICAL THERAPY | Facility: HOSPITAL | Age: 52
Setting detail: THERAPIES SERIES
Discharge: HOME | End: 2022-11-22

## 2022-11-22 DIAGNOSIS — M79.605 LEFT LEG PAIN: Primary | ICD-10-CM

## 2022-11-22 DIAGNOSIS — M25.552 LEFT HIP PAIN: ICD-10-CM

## 2022-11-22 PROCEDURE — 97110 THERAPEUTIC EXERCISES: CPT | Performed by: PHYSICAL THERAPIST

## 2022-11-22 RX ORDER — DIAZEPAM 10 MG/1
TABLET ORAL
Qty: 1 TABLET | Refills: 0 | Status: SHIPPED | OUTPATIENT
Start: 2022-11-22

## 2022-11-22 NOTE — THERAPY TREATMENT NOTE
Outpatient Physical Therapy Ortho Treatment Note  [facility]     Patient Name: Vanessa Baech  : 1970  MRN: [id number]  Today's Date: 2022      Visit Date: 2022    Visit Dx:    ICD-10-CM ICD-9-CM   1. Left leg pain  M79.605 729.5   2. Left hip pain  M25.552 719.45       Patient Active Problem List   Diagnosis   • Plantar fasciitis   • Acquired hypothyroidism   • Soft tissue mass        Past Medical History:   Diagnosis Date   • Abnormal mammography    • Acquired hypothyroidism    • Acute bronchitis    • Acute otitis externa    • Allergic reaction     Allergic reaction to substance - Poison IVY      • Alopecia    • Aphthous ulcer of mouth    • Contact dermatitis due to plants, except food    • Cough    • Fever    • GERD (gastroesophageal reflux disease)    • Herpes zoster    • PONV (postoperative nausea and vomiting)    • Presbyopia    • Upper respiratory infection    • Vitamin D deficiency    • Wheezing         Past Surgical History:   Procedure Laterality Date   • APPENDECTOMY N/A 2021    Procedure: LAPAROSCOPIC  APPENDECTOMY;  Surgeon: Satnam Nina MD;  Location: [facility] OR;  Service: General;  Laterality: N/A;   • COLONOSCOPY N/A 3/12/2021    Procedure: COLONOSCOPY;  Surgeon: Ryan Baker DO;  Location: [facility] ENDOSCOPY;  Service: Gastroenterology;  Laterality: N/A;   • HYSTERECTOMY     • INJECTION OF MEDICATION  2014    KENALOG(2)   • LAPAROSCOPIC TUBAL LIGATION     • MASS EXCISION Right 2018    Procedure: EXCISE SOFT TISSUE MASS RIGHT BUTTOCK       (ANESTHESIA REQUEST YUEN);  Surgeon: Miguelito Galvan MD;  Location: [facility] OR;  Service: General   • THYROIDECTOMY, PARTIAL  2000        PT Ortho     Row Name 22 1100       Subjective Comments    Subjective Comments Reports having injection last Friday and feels better since injection but has felt tightness  -BB       Precautions and Contraindications    Precautions/Limitations no known  precautions/limitations  -BB       Subjective Pain    Able to rate subjective pain? yes  -BB    Pre-Treatment Pain Level 3  -BB          User Key  (r) = Recorded By, (t) = Taken By, (c) = Cosigned By    Initials Name Provider Type    Vicky Gonzalez, PT DPT Physical Therapist                             PT Assessment/Plan     Row Name 11/22/22 1100          PT Assessment    Assessment Comments MFR to improve tissue mobility. HEP progressed this date for [place]C, attempted star 3 way with pain felt in calf so deferred CKC this date and remain in [place].  -BB        PT Plan    PT Frequency 2x/week;1x/week  -BB     Predicted Duration of Therapy Intervention (PT) 4-6 weeks  -BB     PT Plan Comments progress CKC strength, streching, manual  -BB           User Key  (r) = Recorded By, (t) = Taken By, (c) = Cosigned By    Initials Name Provider Type    Vicky Gonzalez, PT DPT Physical Therapist                   OP Exercises     Row Name 11/22/22 1100             Subjective Comments    Subjective Comments Reports having injection last Friday and feels better since injection but has felt tightness  -BB         Subjective Pain    Able to rate subjective pain? yes  -BB      Pre-Treatment Pain Level 3  -BB         Exercise 1    Exercise Name 1 MFR to calf/thigh  -BB      Time 1 18'  -BB         Exercise 2    Exercise Name 2 HEP: SLR/VMO SLR/ hip 3 way with foot ir/ER and neutral  -BB         Exercise 3    Exercise Name 3 HEP for soleus stretch and ankle tband 4 way  -BB            User Key  (r) = Recorded By, (t) = Taken By, (c) = Cosigned By    Initials Name Provider Type    Vicky Gonzalez PT DPT Physical Therapist                              PT OP Goals     Row Name 11/22/22 1100          Long Term Goals    LTG Date to Achieve 12/08/22  -BB     LTG 1 No increased pain kneeling on left knee  -BB     LTG 1 Progress Not Met  -BB     LTG 2 hip abduction strength 4+/5  -BB     LTG 2 Progress Not Met  -BB     LTG 3 decrease ttp  of soleus  -BB     LTG 3 Progress Met  -BB     LTG 4 complete a 2 mile job without increased pain  -BB     LTG 4 Progress Not Met  -BB        Time Calculation    PT Goal Re-Cert Due Date 12/08/22  -BB           User Key  (r) = Recorded By, (t) = Taken By, (c) = Cosigned By    Initials Name Provider Type    Vicky Gonzalez, PT DPT Physical Therapist                               Time Calculation:   Start Time: 1107  Stop Time: 1145  Time Calculation (min): 38 min  PT Non-Billable Time (min): 5 min (clothes changed)  Therapy Charges for Today     Code Description Service Date Service Provider Modifiers Qty    29297408679 HC PT THER PROC EA 15 MIN 11/22/2022 Vicky Pickering, PT DPT GP 2    52738706047 HC PT THER SUPP EA 15 MIN 11/22/2022 Vicky Pickering, PT DPT GP 1                    Vicky Pickering, PT DPT  11/22/2022

## 2022-11-28 ENCOUNTER — APPOINTMENT (OUTPATIENT)
Dept: PHYSICAL THERAPY | Facility: HOSPITAL | Age: 52
End: 2022-11-28

## 2022-11-28 ENCOUNTER — HOSPITAL ENCOUNTER (OUTPATIENT)
Dept: MRI IMAGING | Facility: HOSPITAL | Age: 52
Discharge: HOME | End: 2022-11-28

## 2022-11-28 DIAGNOSIS — M23.92 INTERNAL DERANGEMENT OF LEFT KNEE: ICD-10-CM

## 2022-11-28 DIAGNOSIS — M79.662 PAIN OF LEFT CALF: ICD-10-CM

## 2022-11-28 DIAGNOSIS — M79.605 LEFT LEG PAIN: ICD-10-CM

## 2022-11-30 ENCOUNTER — HOSPITAL ENCOUNTER (OUTPATIENT)
Dept: PHYSICAL THERAPY | Facility: HOSPITAL | Age: 52
Setting detail: THERAPIES SERIES
Discharge: HOME | End: 2022-11-30

## 2022-11-30 DIAGNOSIS — M25.552 LEFT HIP PAIN: ICD-10-CM

## 2022-11-30 DIAGNOSIS — M79.605 LEFT LEG PAIN: Primary | ICD-10-CM

## 2022-11-30 PROCEDURE — 97110 THERAPEUTIC EXERCISES: CPT | Performed by: PHYSICAL THERAPIST

## 2022-12-05 ENCOUNTER — APPOINTMENT (OUTPATIENT)
Dept: PHYSICAL THERAPY | Facility: HOSPITAL | Age: 52
End: 2022-12-05
Payer: COMMERCIAL

## 2022-12-06 ENCOUNTER — HOSPITAL ENCOUNTER (OUTPATIENT)
Dept: MRI IMAGING | Facility: HOSPITAL | Age: 52
Discharge: HOME | End: 2022-12-06

## 2022-12-06 PROCEDURE — 73721 MRI JNT OF LWR EXTRE W/O DYE: CPT

## 2022-12-06 PROCEDURE — 73718 MRI LOWER EXTREMITY W/O DYE: CPT

## 2022-12-07 ENCOUNTER — HOSPITAL ENCOUNTER (OUTPATIENT)
Dept: PHYSICAL THERAPY | Facility: HOSPITAL | Age: 52
Setting detail: THERAPIES SERIES
Discharge: HOME | End: 2022-12-07
Payer: COMMERCIAL

## 2022-12-07 DIAGNOSIS — M25.552 LEFT HIP PAIN: ICD-10-CM

## 2022-12-07 DIAGNOSIS — M79.605 LEFT LEG PAIN: Primary | ICD-10-CM

## 2022-12-07 PROCEDURE — 97140 MANUAL THERAPY 1/> REGIONS: CPT | Performed by: PHYSICAL THERAPIST

## 2022-12-07 NOTE — THERAPY PROGRESS REPORT/RE-CERT
Outpatient Physical Therapy Ortho Progress Note  [facility]     Patient Name: Vanessa Beach  : 1970  MRN: [id number]  Today's Date: 2022      Visit Date: 2022    Visit Dx:    ICD-10-CM ICD-9-CM   1. Left leg pain  M79.605 729.5   2. Left hip pain  M25.552 719.45       Patient Active Problem List   Diagnosis   • Plantar fasciitis   • Acquired hypothyroidism   • Soft tissue mass        Past Medical History:   Diagnosis Date   • Abnormal mammography    • Acquired hypothyroidism    • Acute bronchitis    • Acute otitis externa    • Allergic reaction     Allergic reaction to substance - Poison IVY      • Alopecia    • Aphthous ulcer of mouth    • Contact dermatitis due to plants, except food    • Cough    • Fever    • GERD (gastroesophageal reflux disease)    • Herpes zoster    • PONV (postoperative nausea and vomiting)    • Presbyopia    • Upper respiratory infection    • Vitamin D deficiency    • Wheezing         Past Surgical History:   Procedure Laterality Date   • APPENDECTOMY N/A 2021    Procedure: LAPAROSCOPIC  APPENDECTOMY;  Surgeon: Satnam Nina MD;  Location: [facility] OR;  Service: General;  Laterality: N/A;   • COLONOSCOPY N/A 3/12/2021    Procedure: COLONOSCOPY;  Surgeon: Ryan Baker DO;  Location: [facility] ENDOSCOPY;  Service: Gastroenterology;  Laterality: N/A;   • HYSTERECTOMY     • INJECTION OF MEDICATION  2014    KENALOG(2)   • LAPAROSCOPIC TUBAL LIGATION     • MASS EXCISION Right 2018    Procedure: EXCISE SOFT TISSUE MASS RIGHT BUTTOCK       (ANESTHESIA REQUEST YUEN);  Surgeon: Miguelito Galvan MD;  Location: [facility] OR;  Service: General   • THYROIDECTOMY, PARTIAL  2000        PT Ortho     Row Name 22 0845       Subjective Comments    Subjective Comments Reports feeling over all better but thinks steroid shot is wearing off as is noted to have a ache in lateral calf with walking this date  -BB       Precautions and Contraindications     Precautions/Limitations no known precautions/limitations  -BB       Subjective Pain    Able to rate subjective pain? yes  -BB    Pre-Treatment Pain Level 3  -BB    Post-Treatment Pain Level 2  -BB       Posture/Observations    Posture/Observations Comments no antalgics noted- Tissue knot muscular in nature felt of lateral compartment of leg  -BB       Knee Palpation    Knee Palpation? --  taut and tender fibularis longus/brevis  -BB    Lateral Gastroc Head Tender;Guarded/taut  soleus laterally  -BB          User Key  (r) = Recorded By, (t) = Taken By, (c) = Cosigned By    Initials Name Provider Type    Vicky Gonzalez PT DPT Physical Therapist                             PT Assessment/Plan     Row Name 12/07/22 2217          PT Assessment    Functional Limitations Performance in leisure activities;Impaired gait  -BB     Impairments Gait;Pain;Muscle strength;Poor body mechanics;Impaired muscle endurance  -BB     Assessment Comments Awaiting MRI results of knee and leg. Is noted to have taut and tendnerness of lateral gastroc and fibularis longus/brevis with tissue knot noted. Strength is progressing seen in SLS with improved ankle stability seen. ROM is WFL. Possible progression into dynamic gait/jog pending MRI results. Does respond positively temporarily to manual tissue release  -BB     Rehab Potential Good  -BB     Patient/caregiver participated in establishment of treatment plan and goals Yes  -BB     Patient would benefit from skilled therapy intervention Yes  -BB        PT Plan    PT Frequency 2x/week;1x/week  -BB     Predicted Duration of Therapy Intervention (PT) 4 weeks  -BB     PT Plan Comments possible jog next session, stretching, strength, manual, modalities  -BB           User Key  (r) = Recorded By, (t) = Taken By, (c) = Cosigned By    Initials Name Provider Type    Vicky Gonzalez, PT DPT Physical Therapist                   OP Exercises     Row Name 12/07/22 4548             Subjective Comments     Subjective Comments Reports feeling over all better but thinks steroid shot is wearing off as is noted to have a ache in lateral calf with walking this date  -BB         Subjective Pain    Able to rate subjective pain? yes  -BB      Pre-Treatment Pain Level 3  -BB      Post-Treatment Pain Level 2  -BB         Exercise 1    Exercise Name 1 MFR, glides, trp and co/co posterior and lateral compartment  -BB      Time 1 35'  -BB         Exercise 2    Exercise Name 2 SLS 3 way star  -BB      Sets 2 1  -BB      Reps 2 10  -BB      Additional Comments Reported increased pain of lateal leg proximal 1/3  -BB         Exercise 3    Exercise Name 3 SLS  -BB      Time 3 1'  -BB      Additional Comments Good ankle strategy noted  -BB            User Key  (r) = Recorded By, (t) = Taken By, (c) = Cosigned By    Initials Name Provider Type    Vicky Gonzalez PT DPT Physical Therapist                              PT OP Goals     Row Name 12/07/22 0845          Long Term Goals    LTG Date to Achieve 12/28/22  -BB     LTG 1 No increased pain kneeling on left knee  -BB     LTG 1 Progress Not Met  -BB     LTG 2 hip abduction strength 4+/5  -BB     LTG 2 Progress Not Met  -BB     LTG 3 decrease ttp of soleus  -BB     LTG 3 Progress Met  -BB     LTG 4 complete a 2 mile job without increased pain  -BB     LTG 4 Progress Not Met  -BB        Time Calculation    PT Goal Re-Cert Due Date 12/28/22  -BB           User Key  (r) = Recorded By, (t) = Taken By, (c) = Cosigned By    Initials Name Provider Type    Vicky Gonzalez PT DPT Physical Therapist                               Time Calculation:   Start Time: 0845  Stop Time: 0928  Time Calculation (min): 43 min  Therapy Charges for Today     Code Description Service Date Service Provider Modifiers Qty    96620676045 HC PT MANUAL THERAPY EA 15 MIN 12/7/2022 Vicky Pickering PT DPT GP 3                    MARY CampT  12/7/2022

## 2022-12-14 ENCOUNTER — OFFICE VISIT (OUTPATIENT)
Dept: ORTHOPEDIC SURGERY | Facility: CLINIC | Age: 52
End: 2022-12-14

## 2022-12-14 ENCOUNTER — HOSPITAL ENCOUNTER (OUTPATIENT)
Dept: PHYSICAL THERAPY | Facility: HOSPITAL | Age: 52
Setting detail: THERAPIES SERIES
Discharge: HOME | End: 2022-12-14
Payer: COMMERCIAL

## 2022-12-14 VITALS — BODY MASS INDEX: 23.92 KG/M2 | HEIGHT: 65 IN

## 2022-12-14 DIAGNOSIS — M79.605 LEFT LEG PAIN: ICD-10-CM

## 2022-12-14 DIAGNOSIS — M23.92 INTERNAL DERANGEMENT OF LEFT KNEE: Primary | ICD-10-CM

## 2022-12-14 DIAGNOSIS — M79.605 LEFT LEG PAIN: Primary | ICD-10-CM

## 2022-12-14 DIAGNOSIS — M79.662 PAIN OF LEFT CALF: ICD-10-CM

## 2022-12-14 PROCEDURE — 99214 OFFICE O/P EST MOD 30 MIN: CPT | Performed by: NURSE PRACTITIONER

## 2022-12-14 PROCEDURE — 97110 THERAPEUTIC EXERCISES: CPT | Performed by: PHYSICAL THERAPIST

## 2022-12-14 NOTE — THERAPY TREATMENT NOTE
Outpatient Physical Therapy Ortho Treatment Note  [facility]     Patient Name: Vanessa Beach  : 1970  MRN: [id number]  Today's Date: 2022      Visit Date: 2022    Visit Dx:    ICD-10-CM ICD-9-CM   1. Left leg pain  M79.605 729.5       Patient Active Problem List   Diagnosis   • Plantar fasciitis   • Acquired hypothyroidism   • Soft tissue mass        Past Medical History:   Diagnosis Date   • Abnormal mammography    • Acquired hypothyroidism    • Acute bronchitis    • Acute otitis externa    • Allergic reaction     Allergic reaction to substance - Poison IVY      • Alopecia    • Aphthous ulcer of mouth    • Contact dermatitis due to plants, except food    • Cough    • Fever    • GERD (gastroesophageal reflux disease)    • Herpes zoster    • PONV (postoperative nausea and vomiting)    • Presbyopia    • Upper respiratory infection    • Vitamin D deficiency    • Wheezing         Past Surgical History:   Procedure Laterality Date   • APPENDECTOMY N/A 2021    Procedure: LAPAROSCOPIC  APPENDECTOMY;  Surgeon: Satnam Nina MD;  Location: [facility] OR;  Service: General;  Laterality: N/A;   • COLONOSCOPY N/A 3/12/2021    Procedure: COLONOSCOPY;  Surgeon: Ryan Baker DO;  Location: [facility] ENDOSCOPY;  Service: Gastroenterology;  Laterality: N/A;   • HYSTERECTOMY     • INJECTION OF MEDICATION  2014    KENALOG(2)   • LAPAROSCOPIC TUBAL LIGATION     • MASS EXCISION Right 2018    Procedure: EXCISE SOFT TISSUE MASS RIGHT BUTTOCK       (ANESTHESIA REQUEST YUEN);  Surgeon: Miguelito Galvan MD;  Location: [facility];  Service: General   • THYROIDECTOMY, PARTIAL  2000        PT Ortho     Row Name 22 0800       Precautions and Contraindications    Precautions/Limitations no known precautions/limitations  -BB       Subjective Pain    Able to rate subjective pain? yes  -BB    Post-Treatment Pain Level --  its fine  -BB       Posture/Observations    Posture/Observations  "Comments no antalgic. Decreased core control/hip stability with planking noted. Taut of peroneal musculature  -BB          User Key  (r) = Recorded By, (t) = Taken By, (c) = Cosigned By    Initials Name Provider Type    Vicky Gonzalez, PT DPT Physical Therapist                             PT Assessment/Plan     Row Name 12/14/22 0800          PT Assessment    Assessment Comments Is to see ortho this afternoon. Increased strength/endurance and core stability performed today with good tolerance but is noted to lack hip/core stability seen with planking. No significant antalgics this date. Remains to have taut peroneal longus/brevis muscles.  -BB        PT Plan    PT Frequency 2x/week;1x/week  -BB     Predicted Duration of Therapy Intervention (PT) 4 weeks  -BB     PT Plan Comments endurance, strength, balance  -BB           User Key  (r) = Recorded By, (t) = Taken By, (c) = Cosigned By    Initials Name Provider Type    Vicky Gonzalez PT DPT Physical Therapist                   OP Exercises     Row Name 12/14/22 0800             Subjective Comments    Subjective Comments Seeing ortho about MRI results today. Pain is mild but comes and goes  -BB         Subjective Pain    Able to rate subjective pain? yes  -BB      Pre-Treatment Pain Level --  feel it a little  -BB      Post-Treatment Pain Level --  its fine  -BB         Exercise 1    Exercise Name 1 Pro 2 level 6  -BB      Time 1 6.5  minutes  -BB         Exercise 2    Exercise Name 2 gastroc 3 way stretch  -BB      Sets 2 3  -BB      Time 2 30\" each  -BB         Exercise 3    Exercise Name 3 soleus stretch  -BB      Sets 3 3  -BB      Reps 3 30\"  -BB         Exercise 4    Exercise Name 4 HS 3 way stretch  -BB      Sets 4 3  -BB      Time 4 30\"  -BB         Exercise 5    Exercise Name 5 hip abd cybex to neutral  -BB      Reps 5 10  -BB      Time 5 5\"  -BB      Additional Comments 50lbs  -BB         Exercise 6    Exercise Name 6 cybex sled 3 leg press  -BB      " "Sets 6 1  -BB      Reps 6 20  -BB      Additional Comments 90lbs  -BB         Exercise 7    Exercise Name 7 lateral bridge  -BB      Sets 7 1  -BB      Reps 7 10  -BB      Additional Comments 5\" hold  -BB         Exercise 8    Exercise Name 8 fwd plank on knee  -BB      Sets 8 1  -BB      Reps 8 10  -BB      Additional Comments 5\" hold  -BB         Exercise 9    Exercise Name 9 prone TKE  -BB      Sets 9 1  -BB      Reps 9 10  -BB         Exercise 10    Exercise Name 10 prone hip extension with knee bent  -BB      Sets 10 1  -BB      Reps 10 10 each  -BB            User Key  (r) = Recorded By, (t) = Taken By, (c) = Cosigned By    Initials Name Provider Type    Vicky Gonzalez PT DPT Physical Therapist                              PT OP Goals     Row Name 12/14/22 0800          Long Term Goals    LTG Date to Achieve 12/28/22  -BB     LTG 1 No increased pain kneeling on left knee  -BB     LTG 1 Progress Not Met  -BB     LTG 2 hip abduction strength 4+/5  -BB     LTG 2 Progress Not Met  -BB     LTG 3 decrease ttp of soleus  -BB     LTG 3 Progress Met  -BB     LTG 4 complete a 2 mile job without increased pain  -BB     LTG 4 Progress Not Met  -BB        Time Calculation    PT Goal Re-Cert Due Date 12/28/22  -BB           User Key  (r) = Recorded By, (t) = Taken By, (c) = Cosigned By    Initials Name Provider Type    Vicky Gonzalez PT DPT Physical Therapist                               Time Calculation:   Start Time: 0808  Stop Time: 0846  Time Calculation (min): 38 min  Therapy Charges for Today     Code Description Service Date Service Provider Modifiers Qty    71172339614  PT THER PROC EA 15 MIN 12/14/2022 Vicky Pickering PT DPT GP 3                    Vicky Pickering PT DPT  12/14/2022     "

## 2022-12-14 NOTE — PROGRESS NOTES
"Vanessa Beach is a 52 y.o. female returns for     Chief Complaint   Patient presents with   • Left Knee - Follow-up, Pain       HISTORY OF PRESENT ILLNESS:      Mrs. Beach is a 52-year-old female who presents today to follow-up on left calf and left knee pain.  She was treated at last appointment with an intra-articular injection.  She is continued physical therapy.  She reports that she has held off on running but has been doing some low impact cardio such as elliptical and exercise bike.  She is also started some strength training for conditioning.  She reports since last being seen her symptoms have much improved and essentially resolved.     CONCURRENT MEDICAL HISTORY:    The following portions of the patient's history were reviewed and updated as appropriate: allergies, current medications, past family history, past medical history, past social history, past surgical history and problem list.     ROS  No fevers or chills.  No chest pain or shortness of air.  No GI or  disturbances.    PHYSICAL EXAMINATION:       Ht 165.1 cm (65\")   LMP  (LMP Unknown)   BMI 23.92 kg/m²     Physical Exam  Vitals and nursing note reviewed.   Constitutional:       General: She is not in acute distress.     Appearance: She is well-developed. She is not toxic-appearing.   HENT:      Head: Normocephalic.   Pulmonary:      Effort: Pulmonary effort is normal. No respiratory distress.   Skin:     General: Skin is warm and dry.   Neurological:      Mental Status: She is alert and oriented to person, place, and time.   Psychiatric:         Behavior: Behavior normal.         Thought Content: Thought content normal.         Judgment: Judgment normal.         GAIT:     [x]  Normal  []  Antalgic    Assistive device: []  None  []  Walker     []  Crutches  []  Cane     []  Wheelchair  []  Stretcher    XR Knee 1 or 2 View Left    Result Date: 11/21/2022  Narrative: PROCEDURE: Left knee x-rays with two views. INDICATION: pain, M79.605 " Pain in left leg. COMPARISON: None. FINDINGS: No joint effusion or soft tissue abnormality. No fractures or acute osseous abnormalities. Moderate patellofemoral joint space narrowing and small posterior superior osteophyte. Small medial femoral condylar osteophyte.     Impression: Moderate degenerative arthritic change patellofemoral joint. Small medial femoral condylar osteophyte. [id number] Electronically signed by:  Dima Luz MD  11/21/2022 8:23 AM CST Workstation: [id number]    XR Tibia Fibula 2 View Left    Result Date: 11/21/2022  Narrative: PROCEDURE: Left tibia and fibula x-rays with two views. INDICATION: pain, M79.605 Pain in left leg COMPARISON: None. FINDINGS: No fracture or acute osseous abnormality. No soft tissue abnormalities.     Impression: Negative left tibia and fibula. [id number] Electronically signed by:  Dima Luz MD  11/21/2022 8:22 AM CST Workstation: [id number]    MRI Tibia Fibula Left Without Contrast    Result Date: 12/9/2022  Narrative: PROCEDURE: MR LOWER EXTREMITY WITHOUT IV CONTRAST INDICATION:  refractory leg pain, M79.605 Pain in left leg M79.662 Pain in left lower leg COMPARISON: None TECHNIQUE: Multiplanar, multisequence MR of the left lower lung was performed without the use of IV contrast. FINDINGS:  Muscles: Normal bulk and signal Bones: Normal marrow signal without fracture or edema Neurovascular: Normal Soft tissues: None     Impression: No visualized abnormality to explain the patient's symptoms. Electronically signed by:  Lucio Teresa DO  12/9/2022 10:57 AM CST Workstation: [id number]    MRI Knee Left Without Contrast    Result Date: 12/9/2022  Narrative: MR KNEE WITHOUT CONTRAST LEFT COMPARISON: None HISTORY: internal derangement, M23.92 Unspecified internal derangement of left knee PROCEDURE: Multiplanar, multisequence MR images of the knee were obtained without contrast FINDINGS: EXTENSOR MECHANISM: Distal quadriceps tendon: Intact Patellar tendon: Intact Patella:  Normally positioned within the femoral groove. Retinaculum: No retinacular disruption. LIGAMENTS: Cruciate ligaments: Intact. Medial collateral ligament: Superficial and deep components intact. No periligamentous edema. Lateral collateral ligament: Intact. Posterolateral corner structures: Intact. MENISCI: Medial meniscus: Intact. Lateral meniscus: Intact. FLUID: Small effusion. Tiny loose body in the medial gastrocnemius and popliteus bursa OSSEOUS/ARTICULAR STRUCTURES: Bones: No fracture, stress reaction, or osseous lesion is seen. Patellofemoral compartment: Diffuse chondral thinning. Full thickness chondral fissuring at the cephalad patellar apex and lateral patellar facet. Full-thickness chondral fissuring at the medial trochlea Medial compartment: No hyaline cartilage disease. Lateral compartment: No hyaline cartilage disease.     Impression: Grade III chondromalacia patella Electronically signed by:  Lucio Teresa DO  12/9/2022 10:51 AM CST Workstation: [id number]                ASSESSMENT:    Diagnoses and all orders for this visit:    Internal derangement of left knee    Pain of left calf    Left leg pain          PLAN    MRI results explained to patient.  Patient has grade III chondromalacia.  Recommend patient continue low weightbearing exercises/strength and conditioning exercises.  We also discussed viscosupplementation as a possible treatment option.  Patient reports symptoms are currently resolved.  Signs and symptoms to report and when to seek care explained to patient.  Patient to return as needed.      Return if symptoms worsen or fail to improve.    EMR Dragon/Transciption Disclaimer: Some of this note may be an electronic transcription/translation of spoken language to printed text.  The electronic translation of spoken language may permit erroneous, or at times, nonsensical words or phrases to be inadvertently transcribed. Although I have reviewed the note for such errors, some may still exist.        This document has been electronically signed by Aleja MARSHALL on December 15, 2022 08:32 CST

## 2022-12-21 ENCOUNTER — HOSPITAL ENCOUNTER (OUTPATIENT)
Dept: PHYSICAL THERAPY | Facility: HOSPITAL | Age: 52
Setting detail: THERAPIES SERIES
Discharge: HOME | End: 2022-12-21
Payer: COMMERCIAL

## 2022-12-21 DIAGNOSIS — M25.552 LEFT HIP PAIN: ICD-10-CM

## 2022-12-21 DIAGNOSIS — M79.605 LEFT LEG PAIN: Primary | ICD-10-CM

## 2022-12-21 PROCEDURE — 97110 THERAPEUTIC EXERCISES: CPT | Performed by: PHYSICAL THERAPIST

## 2022-12-21 NOTE — THERAPY TREATMENT NOTE
Outpatient Physical Therapy Ortho Treatment Note  [facility]     Patient Name: Vanessa Beach  : 1970  MRN: [id number]  Today's Date: 2022      Visit Date: 2022    Visit Dx:    ICD-10-CM ICD-9-CM   1. Left leg pain  M79.605 729.5   2. Left hip pain  M25.552 719.45       Patient Active Problem List   Diagnosis   • Plantar fasciitis   • Acquired hypothyroidism   • Soft tissue mass        Past Medical History:   Diagnosis Date   • Abnormal mammography    • Acquired hypothyroidism    • Acute bronchitis    • Acute otitis externa    • Allergic reaction     Allergic reaction to substance - Poison IVY      • Alopecia    • Aphthous ulcer of mouth    • Contact dermatitis due to plants, except food    • Cough    • Fever    • GERD (gastroesophageal reflux disease)    • Herpes zoster    • PONV (postoperative nausea and vomiting)    • Presbyopia    • Upper respiratory infection    • Vitamin D deficiency    • Wheezing         Past Surgical History:   Procedure Laterality Date   • APPENDECTOMY N/A 2021    Procedure: LAPAROSCOPIC  APPENDECTOMY;  Surgeon: Satnam Nina MD;  Location: [facility] OR;  Service: General;  Laterality: N/A;   • COLONOSCOPY N/A 3/12/2021    Procedure: COLONOSCOPY;  Surgeon: Ryan Baker DO;  Location: [facility] ENDOSCOPY;  Service: Gastroenterology;  Laterality: N/A;   • HYSTERECTOMY     • INJECTION OF MEDICATION  2014    KENALOG(2)   • LAPAROSCOPIC TUBAL LIGATION     • MASS EXCISION Right 2018    Procedure: EXCISE SOFT TISSUE MASS RIGHT BUTTOCK       (ANESTHESIA REQUEST YUEN);  Surgeon: Miguelito Galvan MD;  Location: [facility] OR;  Service: General   • THYROIDECTOMY, PARTIAL  2000        PT Ortho     Row Name 22 0800       Subjective Comments    Subjective Comments No pain today, leg is feeling over all better. PLans to try and jog after the holiday.  -BB       Precautions and Contraindications    Precautions/Limitations no known  precautions/limitations  -BB       Subjective Pain    Able to rate subjective pain? yes  -BB    Pre-Treatment Pain Level 0  -BB    Post-Treatment Pain Level 0  -BB       Posture/Observations    Posture/Observations Comments no tissue knots felt, gastroc taut  -BB          User Key  (r) = Recorded By, (t) = Taken By, (c) = Cosigned By    Initials Name Provider Type    BB Vicky Pickering, PT DPT Physical Therapist                             PT Assessment/Plan     Row Name 12/21/22 0800          PT Assessment    Assessment Comments Hip ERs and hip flexors on left remain weaker compared to right, balance is improving. HEP for dynamic CKC given and performed this date with pictures and written instruction.  -BB        PT Plan    PT Frequency 2x/week;1x/week  -BB     Predicted Duration of Therapy Intervention (PT) 4 weeks  -BB     PT Plan Comments Next visit perform gait analysis on jogging.  -BB           User Key  (r) = Recorded By, (t) = Taken By, (c) = Cosigned By    Initials Name Provider Type    Vicky Gonzalez, PT DPT Physical Therapist                   OP Exercises     Row Name 12/21/22 0800             Subjective Comments    Subjective Comments No pain today, leg is feeling over all better. PLans to try and jog after the holiday.  -BB         Subjective Pain    Able to rate subjective pain? yes  -BB      Pre-Treatment Pain Level 0  -BB      Post-Treatment Pain Level 0  -BB         Exercise 1    Exercise Name 1 monster walk red tband  -BB      Time 1 5 laps  -BB      Additional Comments 7 feet  -BB         Exercise 2    Exercise Name 2 box steps red tband  -BB      Time 2 3 laps  -BB      Additional Comments 7 ft  -BB         Exercise 3    Exercise Name 3 lateral tband steps  -BB      Time 3 5 laps  -BB      Additional Comments 7 ft  -BB         Exercise 4    Exercise Name 4 sitting hip ER  -BB      Sets 4 15  -BB      Additional Comments red tband  -BB         Exercise 5    Exercise Name 5 prone hip ER tband   -BB      Sets 5 1  -BB      Reps 5 10  -BB      Additional Comments HEP  -BB         Exercise 6    Exercise Name 6 Prone HS curl  -BB      Reps 6 10  -BB      Additional Comments HEP  -BB         Exercise 7    Exercise Name 7 stnaind hip 3 way with tband  -BB      Sets 7 1  -BB      Reps 7 5 each way  -BB      Additional Comments HEP  -BB         Exercise 8    Exercise Name 8 Standing hip soccer kicks  -BB      Sets 8 1  -BB      Reps 8 10 each  -BB      Additional Comments HEP  -BB            User Key  (r) = Recorded By, (t) = Taken By, (c) = Cosigned By    Initials Name Provider Type    Vicky Gonzalez, PT DPT Physical Therapist                              PT OP Goals     Row Name 12/21/22 0800          Long Term Goals    LTG Date to Achieve 12/28/22  -BB     LTG 1 No increased pain kneeling on left knee  -BB     LTG 1 Progress Not Met  -BB     LTG 2 hip abduction strength 4+/5  -BB     LTG 2 Progress Partially Met  -BB     LTG 2 Progress Comments 4+/5 in sitting  -BB     LTG 3 decrease ttp of soleus  -BB     LTG 3 Progress Met  -BB     LTG 4 complete a 2 mile job without increased pain  -BB     LTG 4 Progress Not Met  -BB        Time Calculation    PT Goal Re-Cert Due Date 12/28/22  -BB           User Key  (r) = Recorded By, (t) = Taken By, (c) = Cosigned By    Initials Name Provider Type    Vicky Gonzalez PT DPT Physical Therapist                               Time Calculation:   Start Time: 0800  Stop Time: 0847  Time Calculation (min): 47 min  PT Non-Billable Time (min): 8 min (time for clothes changed)  Therapy Charges for Today     Code Description Service Date Service Provider Modifiers Qty    44300755744 HC PT THER PROC EA 15 MIN 12/21/2022 Vicky Pickering, PT DPT GP 2    [id number] HC PT THER SUPP EA 15 MIN 12/21/2022 Vicky Pickering PT DPT GP 1                    Vicky Pickering, PT DPT  12/21/2022

## 2023-02-02 DIAGNOSIS — Z12.31 ENCOUNTER FOR SCREENING MAMMOGRAM FOR BREAST CANCER: Primary | ICD-10-CM

## (undated) DEVICE — SKIN AFFIX SURG ADHESIVE 72/CS 0.55ML: Brand: MEDLINE

## (undated) DEVICE — GLV SURG SENSICARE PI ORTHO PF SZ7 LF STRL

## (undated) DEVICE — GLV SURG TRIUMPH LT PF LTX 6.5 STRL

## (undated) DEVICE — HARMONIC ACE +7 LAPAROSCOPIC SHEARS ADVANCED HEMOSTASIS 5MM DIAMETER 36CM SHAFT LENGTH  FOR USE WITH GRAY HAND PIECE ONLY: Brand: HARMONIC ACE

## (undated) DEVICE — SOL IRR NACL 0.9PCT BT 1000ML

## (undated) DEVICE — PK MAJ PROC LF 60

## (undated) DEVICE — STERILE POLYISOPRENE POWDER-FREE SURGICAL GLOVES WITH EMOLLIENT COATING: Brand: PROTEXIS

## (undated) DEVICE — DECANT BG O JET

## (undated) DEVICE — GLV SURG TRIUMPH LT PF LTX 7 STRL

## (undated) DEVICE — STERILE POLYISOPRENE POWDER-FREE SURGICAL GLOVES: Brand: PROTEXIS

## (undated) DEVICE — LUBE JELLY SURGILUBE TB/FLIPCAP 4.5OZ

## (undated) DEVICE — TOWEL,OR,DSP,ST,BLUE,DLX,8/PK,10PK/CS: Brand: MEDLINE

## (undated) DEVICE — ENDOPATH XCEL DILATING TIP TROCARS WITH STABILITY SLEEVES: Brand: ENDOPATH XCEL

## (undated) DEVICE — PATIENT RETURN ELECTRODE, SINGLE-USE, CONTACT QUALITY MONITORING, ADULT, WITH 9FT CORD, FOR PATIENTS WEIGING OVER 33LBS. (15KG): Brand: MEGADYNE

## (undated) DEVICE — ANTIBACTERIAL UNDYED BRAIDED (POLYGLACTIN 910), SYNTHETIC ABSORBABLE SUTURE: Brand: COATED VICRYL

## (undated) DEVICE — ECHELON FLEX  POWERED VASCULAR STAPLER WITH ADVANCED PLACEMENT TIP, 35MM: Brand: ECHELON FLEX

## (undated) DEVICE — GLV SURG SENSICARE POLYISPRN W/ALOE PF LF 6.5 GRN STRL

## (undated) DEVICE — CATHETER,URETHRAL,REDRUBBER,STRL,14FR: Brand: MEDLINE INDUSTRIES, INC.

## (undated) DEVICE — CANN SMPL SOFTECH BIFLO ETCO2 A/M 7FT

## (undated) DEVICE — ENDOPOUCH RETRIEVER SPECIMEN RETRIEVAL BAGS: Brand: ENDOPOUCH RETRIEVER

## (undated) DEVICE — ADHS SKIN PREMIERPRO EXOFIN TOPICAL HI/VISC .5ML

## (undated) DEVICE — PENCL ES MEGADINE EZ/CLEAN BUTN W/HOLSTR 10FT

## (undated) DEVICE — ENDOPATH XCEL BLADELESS TROCARS WITH STABILITY SLEEVES: Brand: ENDOPATH XCEL

## (undated) DEVICE — PK LAP CHOLE LF 60

## (undated) DEVICE — GOWN,PREVENTION PLUS,XLNG/XXLARGE,STRL: Brand: MEDLINE

## (undated) DEVICE — GLV SURG TRIUMPH LT PF LTX 7.5 STRL

## (undated) DEVICE — MONOPOLAR METZENBAUM SCISSOR TIP, DISPOSABLE: Brand: MONOPOLAR METZENBAUM SCISSOR TIP, DISPOSABLE

## (undated) DEVICE — SUT MONOCRYL 4/0 PS2 27IN Y426H ETY426H

## (undated) DEVICE — GLV SURG SENSICARE PI PF LF 7 GRN STRL

## (undated) DEVICE — UNDYED BRAIDED (POLYGLACTIN 910), SYNTHETIC ABSORBABLE SUTURE: Brand: COATED VICRYL

## (undated) DEVICE — GLV SURG TRIUMPH PF LTX 6.5 STRL

## (undated) DEVICE — VISUALIZATION SYSTEM: Brand: CLEARIFY

## (undated) DEVICE — GLV SURG SENSICARE GREEN W/ALOE PF LF 6 STRL

## (undated) DEVICE — SOL IRRIG NACL 1000ML

## (undated) DEVICE — GLV SURG SENSICARE GREEN W/ALOE PF LF 7 STRL

## (undated) DEVICE — GLV SURG SIGNATURE ESSENTIAL PF LTX SZ6.5

## (undated) DEVICE — TRAY,FOLEY INSERTION,PVP,10ML SYRINGE: Brand: MEDLINE

## (undated) DEVICE — CORE TRUMPET FOR SINGLE SOLUTION BAG: Brand: CORE DYNAMICS

## (undated) DEVICE — GOWN,ECLIPSE,FABRIC-REINFORCED,X-LARGE: Brand: MEDLINE